# Patient Record
Sex: FEMALE | Race: BLACK OR AFRICAN AMERICAN | NOT HISPANIC OR LATINO | Employment: FULL TIME | URBAN - METROPOLITAN AREA
[De-identification: names, ages, dates, MRNs, and addresses within clinical notes are randomized per-mention and may not be internally consistent; named-entity substitution may affect disease eponyms.]

---

## 2017-03-30 DIAGNOSIS — F32.A DEPRESSION: ICD-10-CM

## 2017-03-30 RX ORDER — DULOXETIN HYDROCHLORIDE 60 MG/1
CAPSULE, DELAYED RELEASE ORAL
Qty: 30 CAPSULE | Refills: 0 | Status: SHIPPED | OUTPATIENT
Start: 2017-03-30 | End: 2017-05-17 | Stop reason: SDUPTHER

## 2017-05-17 ENCOUNTER — TELEPHONE (OUTPATIENT)
Dept: INTERNAL MEDICINE | Facility: CLINIC | Age: 47
End: 2017-05-17

## 2017-05-17 DIAGNOSIS — F32.A DEPRESSION: ICD-10-CM

## 2017-05-17 RX ORDER — DULOXETIN HYDROCHLORIDE 60 MG/1
CAPSULE, DELAYED RELEASE ORAL
Qty: 30 CAPSULE | Refills: 0 | Status: SHIPPED | OUTPATIENT
Start: 2017-05-17 | End: 2017-07-04 | Stop reason: SDUPTHER

## 2017-05-17 NOTE — TELEPHONE ENCOUNTER
Please contact her to schedule her annual physical exam with me sometime in the next 3 months.  I did okay her medication.

## 2017-05-18 ENCOUNTER — PATIENT MESSAGE (OUTPATIENT)
Dept: INTERNAL MEDICINE | Facility: CLINIC | Age: 47
End: 2017-05-18

## 2017-06-01 ENCOUNTER — HOSPITAL ENCOUNTER (OUTPATIENT)
Dept: RADIOLOGY | Facility: HOSPITAL | Age: 47
Discharge: HOME | End: 2017-06-01
Attending: NURSE PRACTITIONER
Payer: COMMERCIAL

## 2017-06-01 ENCOUNTER — OFFICE VISIT (OUTPATIENT)
Dept: OBSTETRICS AND GYNECOLOGY | Facility: CLINIC | Age: 47
End: 2017-06-01
Payer: COMMERCIAL

## 2017-06-01 VITALS
HEIGHT: 70 IN | WEIGHT: 257.94 LBS | BODY MASS INDEX: 36.93 KG/M2 | DIASTOLIC BLOOD PRESSURE: 72 MMHG | SYSTOLIC BLOOD PRESSURE: 110 MMHG

## 2017-06-01 DIAGNOSIS — Z12.39 BREAST CANCER SCREENING: ICD-10-CM

## 2017-06-01 DIAGNOSIS — E89.40 POST HYSTERECTOMY MENOPAUSE: ICD-10-CM

## 2017-06-01 DIAGNOSIS — Z12.31 VISIT FOR SCREENING MAMMOGRAM: ICD-10-CM

## 2017-06-01 DIAGNOSIS — Z01.419 VISIT FOR GYNECOLOGIC EXAMINATION: Primary | ICD-10-CM

## 2017-06-01 DIAGNOSIS — Z90.710 POST HYSTERECTOMY MENOPAUSE: ICD-10-CM

## 2017-06-01 PROCEDURE — 99999 PR PBB SHADOW E&M-EST. PATIENT-LVL III: CPT | Mod: PBBFAC,,, | Performed by: NURSE PRACTITIONER

## 2017-06-01 PROCEDURE — 99396 PREV VISIT EST AGE 40-64: CPT | Mod: S$GLB,,, | Performed by: NURSE PRACTITIONER

## 2017-06-01 PROCEDURE — 77067 SCR MAMMO BI INCL CAD: CPT | Mod: TC

## 2017-06-01 PROCEDURE — 77063 BREAST TOMOSYNTHESIS BI: CPT | Mod: 26,,, | Performed by: RADIOLOGY

## 2017-06-01 PROCEDURE — 77067 SCR MAMMO BI INCL CAD: CPT | Mod: 26,,, | Performed by: RADIOLOGY

## 2017-06-01 NOTE — PROGRESS NOTES
HISTORY OF PRESENT ILLNESS:    Caryn Zarate is a 46 y.o. female,   presents today for her routine visit and has no gyn complaints.      Past Medical History:   Diagnosis Date    Depression     IFG (impaired fasting glucose) 3/18/2016    Obesity     Sleep apnea 2014    Vitamin D deficiency disease 3/18/2016       Past Surgical History:   Procedure Laterality Date    LAPAROSCOPIC HYSTERECTOMY  2009    [facility], for uterine fibroids    TUBAL LIGATION         MEDICATIONS AND ALLERGIES:      Current Outpatient Prescriptions:     duloxetine (CYMBALTA) 60 MG capsule, TAKE 1 CAPSULE(60 MG) BY MOUTH EVERY DAY, Disp: 30 capsule, Rfl: 0    Review of patient's allergies indicates:   Allergen Reactions    No known drug allergies        Family History   Problem Relation Age of Onset    Hypertension Mother     Diabetes Father     Kidney disease Father      Dialysis    Cancer Maternal Grandmother      breast    Breast cancer Maternal Grandmother     Breast cancer      Colon cancer Neg Hx     Ovarian cancer Neg Hx     Melanoma Neg Hx     Psoriasis Neg Hx     Lupus Neg Hx        Social History     Social History    Marital status:      Spouse name: N/A    Number of children: N/A    Years of education: N/A     Occupational History     [id number]     Social History Main Topics    Smoking status: Never Smoker    Smokeless tobacco: Never Used    Alcohol use No    Drug use: No    Sexual activity: Yes     Partners: Male     Birth control/ protection: Surgical     Other Topics Concern    Not on file     Social History Narrative    No narrative on file       OB HISTORY: Number of vaginal deliveries: 2 Number of SAB:1     COMPREHENSIVE GYN HISTORY:  PAP History: Denies abnormal Paps.  Infection History: Denies STDs. Denies PID.  Benign History: Denies uterine fibroids. Denies ovarian cysts. Denies endometriosis. Denies other conditions.  Cancer History: Denies cervical  "cancer. Denies uterine cancer or hyperplasia. Denies ovarian cancer. Denies vulvar cancer or pre-cancer. Denies vaginal cancer or pre-cancer. Denies breast cancer. Denies colon cancer.  Sexual Activity History: Reports currently being sexually active  Menstrual History: Denies menses. Pt is  (hyst 2009)  not on ERT.      ROS:  GENERAL: No weight changes. No swelling. No fatigue. No fever.  CARDIOVASCULAR: No chest pain. No shortness of breath. No leg cramps.   NEUROLOGICAL: No headaches. No vision changes.  BREASTS: No pain. No lumps. No discharge.  ABDOMEN: No pain. No nausea. No vomiting. No diarrhea. No constipation.  REPRODUCTIVE: No abnormal bleeding.  VULVA: No pain. No lesions. No itching.  VAGINA: No relaxation. No itching. No odor. No discharge. No lesions.  URINARY: No incontinence. No nocturia. No frequency. No dysuria.    /72   Ht 5' 10" (1.778 m)   Wt 117 kg (257 lb 15 oz)   BMI 37.01 kg/m²     PE:  APPEARANCE: Well nourished, well developed, in no acute distress.  AFFECT: WNL, alert and oriented x 3.  SKIN: No acne or hirsutism.  NECK: Neck symmetric without masses or thyromegaly.  NODES: No inguinal, cervical, axillary or femoral lymph node enlargement.  CHEST: Good respiratory effort.   ABDOMEN: OBESE. Soft. No tenderness or masses. PANNUS PRESENT.  BREASTS: PENDULOUS. Symmetrical, no skin changes or visible lesions. No palpable masses, nipple discharge bilaterally.  PELVIC: Normal external female genitalia without lesions. Normal hair distribution. Adequate perineal body, normal urethral meatus. Vagina pink, well rugated without lesions or discharge. No significant cystocele or rectocele. Bimanual exam shows CERVIX and UTERUS to be SURGICALLY ABSENT. Adnexa without masses or tenderness. EXAM DIFFICULT DUE TO BODY HABITUS.  EXTREMITIES: No edema.    DIAGNOSIS:  1. Visit for gynecologic examination    2. Post hysterectomy menopause    3. Breast cancer screening        Orders Placed This " Encounter    Mammo Digital Screening Bilat with CAD       COUNSELING:  The patient was counseled today on  -ACS PAP guidelines (no paps), with recommendations for yearly pelvic exams as her uterus and cervix were removed for benign reasons and ovaries remain;  -recommendation for yearly mammogram;  -to see her primary care physician for all other health maintenance.    FOLLOW-UP with me for next routine visit.

## 2017-06-05 ENCOUNTER — PATIENT MESSAGE (OUTPATIENT)
Dept: INTERNAL MEDICINE | Facility: CLINIC | Age: 47
End: 2017-06-05

## 2017-07-04 DIAGNOSIS — F32.A DEPRESSION: ICD-10-CM

## 2017-07-04 RX ORDER — DULOXETIN HYDROCHLORIDE 60 MG/1
CAPSULE, DELAYED RELEASE ORAL
Qty: 30 CAPSULE | Refills: 0 | Status: SHIPPED | OUTPATIENT
Start: 2017-07-04 | End: 2017-08-20 | Stop reason: SDUPTHER

## 2017-08-03 ENCOUNTER — PATIENT MESSAGE (OUTPATIENT)
Dept: INTERNAL MEDICINE | Facility: CLINIC | Age: 47
End: 2017-08-03

## 2017-08-04 ENCOUNTER — TELEPHONE (OUTPATIENT)
Dept: INTERNAL MEDICINE | Facility: CLINIC | Age: 47
End: 2017-08-04

## 2017-08-04 DIAGNOSIS — N95.1 MENOPAUSE SYNDROME: Primary | ICD-10-CM

## 2017-08-08 ENCOUNTER — TELEPHONE (OUTPATIENT)
Dept: OBSTETRICS AND GYNECOLOGY | Facility: CLINIC | Age: 47
End: 2017-08-08

## 2017-08-08 NOTE — TELEPHONE ENCOUNTER
----- Message from Jana Becerra sent at 8/8/2017  6:53 AM CDT -----  Contact: paOnde request  Message     Appointment Request From: Caryn Zarate    With Provider: Other - (see comments) [oth]    Would Accept With:Request to see a new provider    Preferred Date Range: From 8/9/2017 To 8/10/2017    Preferred Times: Any    Reason for visit: Request an Appt    Comments:  I have referral for Dr. Enrico Pollock - GYN hormone specialist.  I would like an appointment with Dr. Pollock on August 9th between 7:30am - 1pm or August 10th between 9:30am - 1pm.    Thank you.

## 2017-08-20 DIAGNOSIS — F32.A DEPRESSION: ICD-10-CM

## 2017-08-21 RX ORDER — ERGOCALCIFEROL 1.25 MG/1
CAPSULE ORAL
Qty: 12 CAPSULE | Refills: 0 | Status: SHIPPED | OUTPATIENT
Start: 2017-08-21 | End: 2017-11-29

## 2017-08-21 RX ORDER — DULOXETIN HYDROCHLORIDE 60 MG/1
CAPSULE, DELAYED RELEASE ORAL
Qty: 30 CAPSULE | Refills: 0 | Status: SHIPPED | OUTPATIENT
Start: 2017-08-21 | End: 2017-10-25 | Stop reason: SDUPTHER

## 2017-10-25 DIAGNOSIS — F32.A DEPRESSION: ICD-10-CM

## 2017-10-25 RX ORDER — DULOXETIN HYDROCHLORIDE 60 MG/1
CAPSULE, DELAYED RELEASE ORAL
Qty: 30 CAPSULE | Refills: 0 | Status: SHIPPED | OUTPATIENT
Start: 2017-10-25 | End: 2017-12-31 | Stop reason: SDUPTHER

## 2017-11-29 ENCOUNTER — TELEPHONE (OUTPATIENT)
Dept: INTERNAL MEDICINE | Facility: CLINIC | Age: 47
End: 2017-11-29

## 2017-11-29 ENCOUNTER — PATIENT MESSAGE (OUTPATIENT)
Dept: INTERNAL MEDICINE | Facility: CLINIC | Age: 47
End: 2017-11-29

## 2017-11-29 ENCOUNTER — LAB VISIT (OUTPATIENT)
Dept: LAB | Facility: HOSPITAL | Age: 47
End: 2017-11-29
Attending: INTERNAL MEDICINE
Payer: COMMERCIAL

## 2017-11-29 ENCOUNTER — OFFICE VISIT (OUTPATIENT)
Dept: INTERNAL MEDICINE | Facility: CLINIC | Age: 47
End: 2017-11-29
Payer: COMMERCIAL

## 2017-11-29 VITALS
DIASTOLIC BLOOD PRESSURE: 70 MMHG | HEIGHT: 70 IN | WEIGHT: 255.75 LBS | BODY MASS INDEX: 36.61 KG/M2 | SYSTOLIC BLOOD PRESSURE: 110 MMHG

## 2017-11-29 DIAGNOSIS — E55.9 VITAMIN D DEFICIENCY DISEASE: ICD-10-CM

## 2017-11-29 DIAGNOSIS — R53.83 OTHER FATIGUE: ICD-10-CM

## 2017-11-29 DIAGNOSIS — R73.01 IFG (IMPAIRED FASTING GLUCOSE): ICD-10-CM

## 2017-11-29 DIAGNOSIS — E66.9 OBESITY, CLASS II, BMI 35-39.9: ICD-10-CM

## 2017-11-29 DIAGNOSIS — N64.4 BREAST PAIN, RIGHT: ICD-10-CM

## 2017-11-29 DIAGNOSIS — N64.4 BREAST PAIN, RIGHT: Primary | ICD-10-CM

## 2017-11-29 LAB
25(OH)D3+25(OH)D2 SERPL-MCNC: 18 NG/ML
ALBUMIN SERPL BCP-MCNC: 3.6 G/DL
ALP SERPL-CCNC: 89 U/L
ALT SERPL W/O P-5'-P-CCNC: 20 U/L
ANION GAP SERPL CALC-SCNC: 7 MMOL/L
AST SERPL-CCNC: 23 U/L
BASOPHILS # BLD AUTO: 0.02 K/UL
BASOPHILS NFR BLD: 0.4 %
BILIRUB SERPL-MCNC: 0.6 MG/DL
BUN SERPL-MCNC: 10 MG/DL
CALCIUM SERPL-MCNC: 9.2 MG/DL
CHLORIDE SERPL-SCNC: 104 MMOL/L
CO2 SERPL-SCNC: 29 MMOL/L
CREAT SERPL-MCNC: 0.9 MG/DL
DIFFERENTIAL METHOD: NORMAL
EOSINOPHIL # BLD AUTO: 0.1 K/UL
EOSINOPHIL NFR BLD: 2.5 %
ERYTHROCYTE [DISTWIDTH] IN BLOOD BY AUTOMATED COUNT: 12.1 %
EST. GFR  (AFRICAN AMERICAN): >60 ML/MIN/1.73 M^2
EST. GFR  (NON AFRICAN AMERICAN): >60 ML/MIN/1.73 M^2
ESTIMATED AVG GLUCOSE: 120 MG/DL
GLUCOSE SERPL-MCNC: 91 MG/DL
HBA1C MFR BLD HPLC: 5.8 %
HCT VFR BLD AUTO: 38.6 %
HGB BLD-MCNC: 13.1 G/DL
LYMPHOCYTES # BLD AUTO: 1.9 K/UL
LYMPHOCYTES NFR BLD: 35.9 %
MCH RBC QN AUTO: 30.3 PG
MCHC RBC AUTO-ENTMCNC: 33.9 G/DL
MCV RBC AUTO: 89 FL
MONOCYTES # BLD AUTO: 0.4 K/UL
MONOCYTES NFR BLD: 7.6 %
NEUTROPHILS # BLD AUTO: 2.8 K/UL
NEUTROPHILS NFR BLD: 53.4 %
NRBC BLD-RTO: 0 /100 WBC
PLATELET # BLD AUTO: 284 K/UL
PMV BLD AUTO: 9.3 FL
POTASSIUM SERPL-SCNC: 4.2 MMOL/L
PROT SERPL-MCNC: 6.9 G/DL
RBC # BLD AUTO: 4.33 M/UL
SODIUM SERPL-SCNC: 140 MMOL/L
TSH SERPL DL<=0.005 MIU/L-ACNC: 1.38 UIU/ML
VIT B12 SERPL-MCNC: 630 PG/ML
WBC # BLD AUTO: 5.26 K/UL

## 2017-11-29 PROCEDURE — 83036 HEMOGLOBIN GLYCOSYLATED A1C: CPT

## 2017-11-29 PROCEDURE — 82306 VITAMIN D 25 HYDROXY: CPT

## 2017-11-29 PROCEDURE — 99214 OFFICE O/P EST MOD 30 MIN: CPT | Mod: S$GLB,,, | Performed by: INTERNAL MEDICINE

## 2017-11-29 PROCEDURE — 80053 COMPREHEN METABOLIC PANEL: CPT

## 2017-11-29 PROCEDURE — 36415 COLL VENOUS BLD VENIPUNCTURE: CPT

## 2017-11-29 PROCEDURE — 84443 ASSAY THYROID STIM HORMONE: CPT

## 2017-11-29 PROCEDURE — 99999 PR PBB SHADOW E&M-EST. PATIENT-LVL III: CPT | Mod: PBBFAC,,, | Performed by: INTERNAL MEDICINE

## 2017-11-29 PROCEDURE — 85025 COMPLETE CBC W/AUTO DIFF WBC: CPT

## 2017-11-29 PROCEDURE — 82607 VITAMIN B-12: CPT

## 2017-11-29 RX ORDER — VIT C/E/ZN/COPPR/LUTEIN/ZEAXAN 250MG-90MG
2000 CAPSULE ORAL DAILY
Qty: 60 CAPSULE | Refills: 12 | COMMUNITY
Start: 2017-11-29 | End: 2020-02-03

## 2017-11-29 RX ORDER — ERGOCALCIFEROL 1.25 MG/1
50000 CAPSULE ORAL
Qty: 12 CAPSULE | Refills: 12 | Status: SHIPPED | OUTPATIENT
Start: 2017-11-29 | End: 2018-11-29

## 2017-11-29 NOTE — PROGRESS NOTES
Subjective:       Patient ID: Caryn Zarate is a 47 y.o. female.    Chief Complaint: Breast Pain (R)    [facility] appt    R breast pain x 3 days.   Does not feel any mass.   Not a constant pain.  Always in same spot.   Sx may last for about a minute or so several times a day.  No injury or trauma.  Has been increasing her walking 2 miles daily.  Did hang Xmas lights on Sunday.  Is also doing sit ups.    Hurts when driving and her R arm is moving across body it really hurts.      Sx getting worse.    Trying to lose weight, some slow success.  Not using CPAP; does not feel she needs this now, no longer snoring.  BRYCE issues discussed.    Mood stable.    Patient Active Problem List:     Depression     Obesity, Class II, BMI 35-39.9     Sleep apnea: needs CPAP 10 per sleep study 6/14     IFG (impaired fasting glucose)     Vitamin D deficiency disease        Review of Systems   Respiratory: Negative for cough and shortness of breath.         Feels apnea better   Cardiovascular: Negative for chest pain.        Breast pain   Genitourinary:        No bleeding (hysterectomy)  No cyclical nature to sx   Psychiatric/Behavioral: Negative for dysphoric mood. The patient is not nervous/anxious.         Stable mood       Objective:      Physical Exam   Constitutional: She is oriented to person, place, and time. She appears well-developed and well-nourished.   HENT:   Head: Normocephalic and atraumatic.   Right Ear: External ear normal.   Left Ear: External ear normal.   Mouth/Throat: Oropharynx is clear and moist.   Eyes: Conjunctivae are normal.   Neck: Normal range of motion. Neck supple. No thyromegaly present.   Cardiovascular: Normal rate, regular rhythm and normal heart sounds.    Pulmonary/Chest: No respiratory distress. She has no wheezes. She has no rales.   BREASTS: No masses, nipple d/c or LN  Diffuse tenderness R > L   Abdominal: Soft.   Musculoskeletal: She exhibits no edema.   Lymphadenopathy:     She has no cervical  adenopathy.   Neurological: She is alert and oriented to person, place, and time.   Skin: Skin is warm and dry. No rash noted. No erythema.       Assessment:       1. Breast pain, right    2. Vitamin D deficiency disease    3. IFG (impaired fasting glucose)    4. Other fatigue    5. Obesity, Class II, BMI 35-39.9        Plan:         Breast pain, right: low dose NSAIDs a/w tylenol; get new bra with more support as her breasts are large and pendulous.  May be mechanical/chest wall.  Recent mammogram wnl.  No nipple d/c. Anticipate should be self-limited.  Avoid caffeine (she already drinks little caffeine)  -     CBC auto differential; Future; Expected date: 11/29/2017  -     US Breast Right Complete; Future; Expected date: 11/29/2017    Vitamin D deficiency disease  -     Vitamin D; Future; Expected date: 11/29/2017    IFG (impaired fasting glucose)  -     Comprehensive metabolic panel; Future; Expected date: 11/29/2017  -     Hemoglobin A1c; Future; Expected date: 11/29/2017    Other fatigue: no alarm sx, mood stable on Cymbalta  -     TSH; Future; Expected date: 11/29/2017  -     Vitamin B12; Future; Expected date: 11/29/2017    Obesity, Class II, BMI 35-39.9: continue lifestyle modification; BRYCE issues and relevance reiterated    I will review all studies and determine further tx depending on findings

## 2017-12-31 DIAGNOSIS — F32.A DEPRESSION: ICD-10-CM

## 2018-01-02 RX ORDER — DULOXETIN HYDROCHLORIDE 60 MG/1
CAPSULE, DELAYED RELEASE ORAL
Qty: 30 CAPSULE | Refills: 0 | Status: SHIPPED | OUTPATIENT
Start: 2018-01-02 | End: 2018-02-16 | Stop reason: SDUPTHER

## 2018-02-16 DIAGNOSIS — F32.A DEPRESSION: ICD-10-CM

## 2018-02-16 RX ORDER — DULOXETIN HYDROCHLORIDE 60 MG/1
CAPSULE, DELAYED RELEASE ORAL
Qty: 30 CAPSULE | Refills: 0 | Status: SHIPPED | OUTPATIENT
Start: 2018-02-16 | End: 2018-04-28 | Stop reason: SDUPTHER

## 2018-02-19 ENCOUNTER — TELEPHONE (OUTPATIENT)
Dept: INTERNAL MEDICINE | Facility: CLINIC | Age: 48
End: 2018-02-19

## 2018-02-19 NOTE — TELEPHONE ENCOUNTER
She no showed today after scheduling online.  Please contact her to see that she want to come in or which he like to schedule some blood work as she had a question about her glucose level.  Thank you

## 2018-03-08 ENCOUNTER — OFFICE VISIT (OUTPATIENT)
Dept: INTERNAL MEDICINE | Facility: CLINIC | Age: 48
End: 2018-03-08
Payer: COMMERCIAL

## 2018-03-08 ENCOUNTER — LAB VISIT (OUTPATIENT)
Dept: LAB | Facility: HOSPITAL | Age: 48
End: 2018-03-08
Attending: INTERNAL MEDICINE
Payer: COMMERCIAL

## 2018-03-08 VITALS
SYSTOLIC BLOOD PRESSURE: 110 MMHG | DIASTOLIC BLOOD PRESSURE: 60 MMHG | WEIGHT: 249.13 LBS | HEIGHT: 70 IN | BODY MASS INDEX: 35.66 KG/M2

## 2018-03-08 DIAGNOSIS — N95.1 MENOPAUSE SYNDROME: ICD-10-CM

## 2018-03-08 DIAGNOSIS — E55.9 VITAMIN D DEFICIENCY DISEASE: ICD-10-CM

## 2018-03-08 DIAGNOSIS — F33.41 RECURRENT MAJOR DEPRESSIVE DISORDER, IN PARTIAL REMISSION: ICD-10-CM

## 2018-03-08 DIAGNOSIS — E66.9 OBESITY, CLASS II, BMI 35-39.9: ICD-10-CM

## 2018-03-08 DIAGNOSIS — N95.1 MENOPAUSE SYNDROME: Primary | ICD-10-CM

## 2018-03-08 DIAGNOSIS — R73.01 IFG (IMPAIRED FASTING GLUCOSE): ICD-10-CM

## 2018-03-08 DIAGNOSIS — R10.12 LEFT UPPER QUADRANT PAIN: ICD-10-CM

## 2018-03-08 DIAGNOSIS — G47.33 OBSTRUCTIVE SLEEP APNEA SYNDROME: ICD-10-CM

## 2018-03-08 LAB
25(OH)D3+25(OH)D2 SERPL-MCNC: 24 NG/ML
ALBUMIN SERPL BCP-MCNC: 3.7 G/DL
ALP SERPL-CCNC: 88 U/L
ALT SERPL W/O P-5'-P-CCNC: 15 U/L
AMORPH CRY UR QL COMP ASSIST: ABNORMAL
AMYLASE SERPL-CCNC: 58 U/L
ANION GAP SERPL CALC-SCNC: 8 MMOL/L
AST SERPL-CCNC: 21 U/L
BACTERIA #/AREA URNS AUTO: ABNORMAL /HPF
BASOPHILS # BLD AUTO: 0.03 K/UL
BASOPHILS NFR BLD: 0.5 %
BILIRUB SERPL-MCNC: 0.4 MG/DL
BILIRUB UR QL STRIP: NEGATIVE
BUN SERPL-MCNC: 14 MG/DL
CALCIUM SERPL-MCNC: 9.3 MG/DL
CHLORIDE SERPL-SCNC: 107 MMOL/L
CLARITY UR REFRACT.AUTO: ABNORMAL
CO2 SERPL-SCNC: 27 MMOL/L
COLOR UR AUTO: YELLOW
CREAT SERPL-MCNC: 0.9 MG/DL
DIFFERENTIAL METHOD: NORMAL
EOSINOPHIL # BLD AUTO: 0.1 K/UL
EOSINOPHIL NFR BLD: 2.4 %
ERYTHROCYTE [DISTWIDTH] IN BLOOD BY AUTOMATED COUNT: 12.1 %
EST. GFR  (AFRICAN AMERICAN): >60 ML/MIN/1.73 M^2
EST. GFR  (NON AFRICAN AMERICAN): >60 ML/MIN/1.73 M^2
ESTIMATED AVG GLUCOSE: 114 MG/DL
FSH SERPL-ACNC: 7.9 MIU/ML
GLUCOSE SERPL-MCNC: 77 MG/DL
GLUCOSE UR QL STRIP: NEGATIVE
HBA1C MFR BLD HPLC: 5.6 %
HCT VFR BLD AUTO: 37.8 %
HGB BLD-MCNC: 12.7 G/DL
HGB UR QL STRIP: NEGATIVE
KETONES UR QL STRIP: NEGATIVE
LEUKOCYTE ESTERASE UR QL STRIP: NEGATIVE
LIPASE SERPL-CCNC: 16 U/L
LYMPHOCYTES # BLD AUTO: 1.9 K/UL
LYMPHOCYTES NFR BLD: 32.1 %
MCH RBC QN AUTO: 30.9 PG
MCHC RBC AUTO-ENTMCNC: 33.6 G/DL
MCV RBC AUTO: 92 FL
MICROSCOPIC COMMENT: ABNORMAL
MONOCYTES # BLD AUTO: 0.5 K/UL
MONOCYTES NFR BLD: 8 %
NEUTROPHILS # BLD AUTO: 3.3 K/UL
NEUTROPHILS NFR BLD: 56.5 %
NITRITE UR QL STRIP: NEGATIVE
NRBC BLD-RTO: 0 /100 WBC
PH UR STRIP: 5 [PH] (ref 5–8)
PLATELET # BLD AUTO: 262 K/UL
PMV BLD AUTO: 9.4 FL
POTASSIUM SERPL-SCNC: 3.9 MMOL/L
PROT SERPL-MCNC: 6.9 G/DL
PROT UR QL STRIP: NEGATIVE
RBC # BLD AUTO: 4.11 M/UL
SODIUM SERPL-SCNC: 142 MMOL/L
SP GR UR STRIP: 1.02 (ref 1–1.03)
SQUAMOUS #/AREA URNS AUTO: 4 /HPF
TSH SERPL DL<=0.005 MIU/L-ACNC: 1.39 UIU/ML
URN SPEC COLLECT METH UR: ABNORMAL
UROBILINOGEN UR STRIP-ACNC: 2 EU/DL
WBC # BLD AUTO: 5.89 K/UL
WBC #/AREA URNS AUTO: 2 /HPF (ref 0–5)

## 2018-03-08 PROCEDURE — 85025 COMPLETE CBC W/AUTO DIFF WBC: CPT

## 2018-03-08 PROCEDURE — 36415 COLL VENOUS BLD VENIPUNCTURE: CPT

## 2018-03-08 PROCEDURE — 81001 URINALYSIS AUTO W/SCOPE: CPT

## 2018-03-08 PROCEDURE — 83690 ASSAY OF LIPASE: CPT

## 2018-03-08 PROCEDURE — 82306 VITAMIN D 25 HYDROXY: CPT

## 2018-03-08 PROCEDURE — 80053 COMPREHEN METABOLIC PANEL: CPT

## 2018-03-08 PROCEDURE — 99214 OFFICE O/P EST MOD 30 MIN: CPT | Mod: S$GLB,,, | Performed by: INTERNAL MEDICINE

## 2018-03-08 PROCEDURE — 99999 PR PBB SHADOW E&M-EST. PATIENT-LVL III: CPT | Mod: PBBFAC,,, | Performed by: INTERNAL MEDICINE

## 2018-03-08 PROCEDURE — 84443 ASSAY THYROID STIM HORMONE: CPT

## 2018-03-08 PROCEDURE — 83036 HEMOGLOBIN GLYCOSYLATED A1C: CPT

## 2018-03-08 PROCEDURE — 82150 ASSAY OF AMYLASE: CPT

## 2018-03-08 PROCEDURE — 83001 ASSAY OF GONADOTROPIN (FSH): CPT

## 2018-03-08 NOTE — Clinical Note
Hi Berkley:  Martha lady.  Some sadness, situational stress, a bit lonely and isolated,  No SI or HI.  Daughters doing well.  Trying to exercise and lose weight; some motivation issues on occasion.  Has a counselor and on antidepressants but could use some more support.  Can you reach out to her?  Thanks  Layla Laws

## 2018-03-08 NOTE — PROGRESS NOTES
Subjective:       Patient ID: Caryn Zarate is a 47 y.o. female.    Chief Complaint: Follow-up    Short follow up    A few issues    Wants to check on possible diabetes and thyroid.    A few months ago some significant sweating, friend told her she could get checked out.  Occasional fingers get numb as well.    Some sadness, situational stress, a bit lonely and isolated,  No SI or HI.  Daughters doing well.  Trying to exercise and lose weight; some motivation issues on occasion.    Not sure about menopause given hysterectomy.    By the way went to ER today with brief severe LUQ pain, this resolved with belching so LWBS.  No sx before or since but does have a lot of gas.  No bowel changes.    Patient Active Problem List:     Recurrent major depressive disorder, in partial remission     Obesity, Class II, BMI 35-39.9     Sleep apnea: needs CPAP 10 per sleep study 6/14     IFG (impaired fasting glucose)     Vitamin D deficiency disease     Left upper quadrant pain        Review of Systems   Constitutional: Negative for chills, fatigue and fever.   HENT: Negative for congestion and postnasal drip.    Eyes: Negative.    Respiratory: Negative for cough, chest tightness, shortness of breath and wheezing.    Cardiovascular: Negative.    Gastrointestinal: Negative.         See above   Endocrine: Negative for polydipsia, polyphagia and polyuria.   Genitourinary:        Sweating   Neurological: Negative for syncope.   Psychiatric/Behavioral: Positive for dysphoric mood. Negative for agitation and decreased concentration.       Objective:      Physical Exam   Constitutional: She is oriented to person, place, and time. She appears well-developed and well-nourished.   HENT:   Head: Normocephalic and atraumatic.   Right Ear: External ear normal.   Left Ear: External ear normal.   Mouth/Throat: Oropharynx is clear and moist.   Eyes: Conjunctivae are normal.   Neck: Normal range of motion. Neck supple. No thyromegaly present.    Cardiovascular: Normal rate, regular rhythm and normal heart sounds.    Pulmonary/Chest: No respiratory distress. She has no wheezes. She has no rales.   Abdominal: Soft. Bowel sounds are normal.   Musculoskeletal: She exhibits no edema.   Lymphadenopathy:     She has no cervical adenopathy.   Neurological: She is alert and oriented to person, place, and time.   Skin: Skin is warm and dry. No rash noted. No erythema.   Psychiatric: She has a normal mood and affect. Her behavior is normal. Judgment and thought content normal.   Tearful no SI or HI       Assessment:       1. Menopause syndrome    2. IFG (impaired fasting glucose)    3. Obesity, Class II, BMI 35-39.9    4. Vitamin D deficiency disease    5. Left upper quadrant pain    6. Recurrent major depressive disorder, in partial remission    7. Obstructive sleep apnea syndrome        Plan:         Menopause syndrome  -     Follicle stimulating hormone; Future; Expected date: 03/08/2018    IFG (impaired fasting glucose)  -     CBC auto differential; Future; Expected date: 03/08/2018  -     Comprehensive metabolic panel; Future; Expected date: 03/08/2018  -     Hemoglobin A1c; Future; Expected date: 03/08/2018    Obesity, Class II, BMI 35-39.9: working on this.  Denies BRYCE sx and declines tx- reviewed  -     TSH; Future; Expected date: 03/08/2018    Vitamin D deficiency disease  -     Vitamin D; Future; Expected date: 03/08/2018    Left upper quadrant pain: resolved.  No alarm sx  -     US Abdomen Complete; Future; Expected date: 03/08/2018  -     Amylase; Future; Expected date: 03/08/2018  -     Lipase; Future; Expected date: 03/08/2018  -     Urinalysis    Recurrent major depressive disorder, in partial remission: stable on meds.  Exercise, prayer, meditation.  She has a counselor and will contact her.  No SI or HI.  Health  recommended    BRYCE see above: implications reviewed    Other orders  -     Urinalysis Microscopic    I will review all studies and  determine further tx depending on findings

## 2018-03-09 ENCOUNTER — PATIENT MESSAGE (OUTPATIENT)
Dept: INTERNAL MEDICINE | Facility: CLINIC | Age: 48
End: 2018-03-09

## 2018-03-21 ENCOUNTER — PATIENT MESSAGE (OUTPATIENT)
Dept: INTERNAL MEDICINE | Facility: CLINIC | Age: 48
End: 2018-03-21

## 2018-03-21 ENCOUNTER — HOSPITAL ENCOUNTER (OUTPATIENT)
Dept: RADIOLOGY | Facility: HOSPITAL | Age: 48
Discharge: HOME | End: 2018-03-21
Attending: INTERNAL MEDICINE
Payer: COMMERCIAL

## 2018-03-21 ENCOUNTER — TELEPHONE (OUTPATIENT)
Dept: INTERNAL MEDICINE | Facility: CLINIC | Age: 48
End: 2018-03-21

## 2018-03-21 DIAGNOSIS — R10.12 LEFT UPPER QUADRANT PAIN: ICD-10-CM

## 2018-03-21 DIAGNOSIS — K76.0 FATTY LIVER: ICD-10-CM

## 2018-03-21 DIAGNOSIS — N28.1 RENAL CYST, LEFT: ICD-10-CM

## 2018-03-21 PROCEDURE — 76700 US EXAM ABDOM COMPLETE: CPT | Mod: 26,,, | Performed by: RADIOLOGY

## 2018-03-21 PROCEDURE — 76700 US EXAM ABDOM COMPLETE: CPT | Mod: TC

## 2018-03-29 ENCOUNTER — OFFICE VISIT (OUTPATIENT)
Dept: HEPATOLOGY | Facility: CLINIC | Age: 48
End: 2018-03-29
Payer: COMMERCIAL

## 2018-03-29 VITALS
TEMPERATURE: 97 F | WEIGHT: 249.31 LBS | HEIGHT: 70 IN | HEART RATE: 70 BPM | OXYGEN SATURATION: 97 % | BODY MASS INDEX: 35.69 KG/M2 | DIASTOLIC BLOOD PRESSURE: 83 MMHG | SYSTOLIC BLOOD PRESSURE: 126 MMHG

## 2018-03-29 DIAGNOSIS — R73.01 IFG (IMPAIRED FASTING GLUCOSE): ICD-10-CM

## 2018-03-29 DIAGNOSIS — E66.9 OBESITY, CLASS II, BMI 35-39.9: ICD-10-CM

## 2018-03-29 DIAGNOSIS — K76.0 FATTY LIVER: Primary | ICD-10-CM

## 2018-03-29 PROBLEM — R10.12 LEFT UPPER QUADRANT PAIN: Status: RESOLVED | Noted: 2018-03-08 | Resolved: 2018-03-29

## 2018-03-29 PROCEDURE — 99999 PR PBB SHADOW E&M-EST. PATIENT-LVL III: CPT | Mod: PBBFAC,,, | Performed by: NURSE PRACTITIONER

## 2018-03-29 PROCEDURE — 99204 OFFICE O/P NEW MOD 45 MIN: CPT | Mod: S$GLB,,, | Performed by: NURSE PRACTITIONER

## 2018-03-29 NOTE — PROGRESS NOTES
[facility] Visit New Patient Note    REFERRING PROVIDER: Layla Laws MD    CHIEF COMPLAINT: Fatty liver      HPI: This is a 47 y.o. Black or  female referred for evaluation of fatty liver. Fatty liver present on abdominal US from 3/21/18. Liver noted to be enlarged at 18.6 cm, spleen normal size at 8.3 cm. Review of labs - essentially normal transaminases. Alk phos normal. TBili normal at 0.4. Albumin normal. INR normal in 2014. Platelets normal at 262. Negative for viral hepatitis (2016).                 Risk factors for fatty liver: obesity, Body mass index is 35.78 kg/m²., and impaired fasting glucose. HgbA1c 5.6. Reports weight has fluctuated, she is trying to walk every day (~ 2 miles). Struggles with her diet. Very rarely drinks alcohol. Denies IV or intranasal drug use. Denies family hx of liver disease. She feels well, no complaints. Denies any symptoms of advanced liver disease including jaundice, dark urine, abdominal distention, lower extremity edema, hematemesis, melena, or slowed mentation.             Review of patient's allergies indicates:   Allergen Reactions    No known drug allergies      Current Outpatient Prescriptions on File Prior to Visit   Medication Sig Dispense Refill    DULoxetine (CYMBALTA) 60 MG capsule TAKE 1 CAPSULE(60 MG) BY MOUTH EVERY DAY 30 capsule 0    ergocalciferol (ERGOCALCIFEROL) 50,000 unit Cap Take 1 capsule (50,000 Units total) by mouth every 7 days. 12 capsule 12    cholecalciferol, vitamin D3, 1,000 unit capsule Take 2 capsules (2,000 Units total) by mouth once daily. 60 capsule 12     No current facility-administered medications on file prior to visit.        PMHX:  has a past medical history of Depression; Fatty liver: see u/s 3/18 (3/21/2018); IFG (impaired fasting glucose) (3/18/2016); Obesity; Renal cyst, left: see u/s 3/18 (3/21/2018); Sleep apnea (5/26/2014); and Vitamin D deficiency disease (3/18/2016).    PSHX:  has a past  "surgical history that includes Laparoscopic hysterectomy (4/14/2009); Tubal ligation; and Hysterectomy.    FAMILY HISTORY: Negative for liver disease, reviewed in [employer].    SOCIAL HISTORY:   History   Smoking Status    Never Smoker   Smokeless Tobacco    Never Used       History   Alcohol Use No       History   Drug Use No       ROS:   GENERAL: Denies fever, chills, weight loss/gain, fatigue  HEENT: Denies headaches, dizziness, vision/hearing changes  CARDIOVASCULAR: Denies chest pain, palpitations, or edema  RESPIRATORY: Denies dyspnea, cough  GI: Denies abdominal pain, rectal bleeding, nausea, vomiting. No change in bowel pattern or color  : Denies dysuria, hematuria   SKIN: Denies rash, itching   NEURO: Denies confusion, memory loss, or mood changes  PSYCH: Denies depression or anxiety  HEME/LYMPH: Denies easy bruising or bleeding    PHYSICAL EXAM:   Friendly Black or  female, in no acute distress; alert and oriented to person, place and time  VITALS: /83 (BP Location: Left arm, Patient Position: Sitting, BP Method: X-Large (Automatic))   Pulse 70   Temp 97.4 °F (36.3 °C) (Oral)   Ht 5' 10" (1.778 m)   Wt 113.1 kg (249 lb 5.4 oz)   SpO2 97%   BMI 35.78 kg/m²   HENT: Normocephalic, without obvious abnormality. Oral mucosa pink and moist.   EYES: Sclerae anicteric.   NECK: Supple. No masses or cervical adenopathy.  CARDIOVASCULAR: Regular rate and rhythm. No murmurs.  RESPIRATORY: Normal respiratory effort. BBS CTA. No wheezes or crackles.  GI: Soft, non-tender, non-distended. No palpable hepatosplenomegaly. No masses palpable. No ascites.  EXTREMITIES:  No clubbing, cyanosis or edema.  SKIN: Warm and dry. No jaundice. No rashes noted to exposed skin. No telangectasias noted. No palmar erythema.  NEURO:  Normal gate. No asterixis.  PSYCH:  Memory intact. Thought and speech pattern appropriate. Behavior normal. No depression or anxiety noted.    RECENT LABS:    Labs:  Lab Results "   Component Value Date    WBC 5.89 03/08/2018    HGB 12.7 03/08/2018    HCT 37.8 03/08/2018     03/08/2018    CHOL 177 05/21/2014    TRIG 77 05/21/2014    HDL 51 05/21/2014     03/08/2018    K 3.9 03/08/2018    CREATININE 0.9 03/08/2018    ALT 15 03/08/2018    AST 21 03/08/2018    ALKPHOS 88 03/08/2018    BILITOT 0.4 03/08/2018    ALBUMIN 3.7 03/08/2018    INR 0.9 10/11/2014       DIAGNOSTIC STUDIES:  ABD. U/S - 3/21/18  FINDINGS:  Liver: Enlarged measuring 18.6 cm. The liver is diffusely increased in echogenicity consistent with fatty changes of the liver.  No focal hepatic lesions.    Gallbladder: No calculi, wall thickening, or pericholecystic fluid.  No sonographic Mckee's sign.    Biliary system: The common duct is not dilated, measuring 4 mm.  No intrahepatic ductal dilatation.    Spleen: Normal in size with a homogeneous echotexture, measuring 8.3 cm.    Pancreas: The visualized portions of pancreas appear normal.    Right kidney: Normal in size with no hydronephrosis, measuring 10.1 x 4.4 x 6.0 cm.  No renal calculi are identified.    Left kidney:  Normal in size with no hydronephrosis, measuring 11.9 x 5.5 x 5.5 cm.  There is a 1.0 cm left upper pole renal cyst.  No renal calculi are identified.    Aorta: No aneurysm.    Inferior vena cava: Normal in appearance.    Miscellaneous: No ascites.   Impression     Moderate hepatomegaly with diffusely increased hepatic echogenicity consistent with Fatty changes of the liver.    1.0 cm left upper pole renal cyst.       ASSESSMENT:  47 y.o. Black or  female with:  1. NAFLD  -- Transaminases essentially normal  -- TBili and alk phos normal  -- Platelets preserved at 262  -- US with hepatomegaly and steatosis; spleen normal size   -- Risk factors for fatty liver include: obesity, impaired fasting glucose      2. Obesity, Body mass index is 35.78 kg/m².  3. Impaired fasting glucose       EDUCATION / DISCUSSION:   We discussed the  manifestations of NAFLD. At this time there is no FDA approved therapy for NAFLD. The patient and I discussed the importance of diet, exercise, and weight loss for management of NAFLD. We discussed a low fat, low carb/low sugar, high fiber diet, and a goal of 30 minutes of exercise 5 times per week. Pt is aware that fatty liver can progress to steatohepatitis and possibly to cirrhosis, putting one at increased risk for liver cancer, liver failure, and death.       PLAN:  1. Fibroscan for fibrosis and steatosis staging  2. Will check immunity markers for HBV/HAV and arrange for vaccination if needed.  3. Weight loss goal of 15-20 lbs  4. Low fat, low cholesterol, low carb, high fiber diet  5. Exercise, 5 days a week, 30 min a day  6. Recommend good control of cholesterol, blood pressure, blood sugar levels  7. Follow up pending Fibroscan results       Thank you for allowing me to participate in the care of LUIS ANTONIO Garay-C  Hepatology and Liver Transplant     Patient was seen in collaboration with Dr. Burns

## 2018-03-29 NOTE — LETTER
March 29, 2018      Layla Laws MD  [address]  [place] [facility] [address]           [facility] - [facility]atology  [address]  [place] [address]  Phone: [phone]  Fax: [phone]          Patient: Caryn Zarate   MR Number: [id number]   YOB: 1970   Date of Visit: 3/29/2018       Dear Dr. Layla Laws:    Thank you for referring Caryn Zarate to me for evaluation. Attached you will find relevant portions of my assessment and plan of care.    If you have questions, please do not hesitate to call me. I look forward to following Caryn Zaraet along with you.    Sincerely,    Gardenia Sung, SHEYLA    Enclosure  CC:  No Recipients    If you would like to receive this communication electronically, please contact [email or web address] or [phone] to request more information on [email or web address] Link access.    For providers and/or their staff who would like to refer a patient to [facility], please contact us through our one-stop-shop provider referral line, [facility], [place], at [phone].    If you feel you have received this communication in error or would no longer like to receive these types of communications, please e-mail [email or web address]

## 2018-03-29 NOTE — PROGRESS NOTES
I have reviewed and concur with the CAROLEE's history, physical, assessment, and plan.  I have personally interviewed and examined the patient at bedside.  See below addendum for my evaluation and additional findings.    48yo female with elevated liver tests and risk factors for NAFLD.  Fibroscan for fibrosis staging.  Discussed importance of risk factor modification including tight control of diabetes, hypertension and cholesterol.  Also recommend healthy diet and increased physical activity for goal of weight loss.  If fibroscan normal, patient may follow with primary care but should continue to maintain good control of risk factors.        Patient will return to clinic based on fibroscan results

## 2018-04-16 ENCOUNTER — TELEPHONE (OUTPATIENT)
Dept: INTERNAL MEDICINE | Facility: CLINIC | Age: 48
End: 2018-04-16

## 2018-04-16 NOTE — TELEPHONE ENCOUNTER
..Called patient left message, intro self, referred by Dr. Laws.   Request call back at direct number [phone].  Berkley Vargas RN HC

## 2018-04-16 NOTE — TELEPHONE ENCOUNTER
----- Message from Layla Laws MD sent at 3/9/2018  5:44 AM CST -----  Hi Berkley:    Martha lady.  Some sadness, situational stress, a bit lonely and isolated,  No SI or HI.  Daughters doing well.  Trying to exercise and lose weight; some motivation issues on occasion.  Has a counselor and on antidepressants but could use some more support.  Can you reach out to her?  Thanks    Layla Laws

## 2018-04-17 ENCOUNTER — PATIENT MESSAGE (OUTPATIENT)
Dept: INTERNAL MEDICINE | Facility: CLINIC | Age: 48
End: 2018-04-17

## 2018-04-28 DIAGNOSIS — F32.A DEPRESSION: ICD-10-CM

## 2018-04-30 RX ORDER — DULOXETIN HYDROCHLORIDE 60 MG/1
CAPSULE, DELAYED RELEASE ORAL
Qty: 30 CAPSULE | Refills: 0 | Status: SHIPPED | OUTPATIENT
Start: 2018-04-30 | End: 2018-07-05 | Stop reason: SDUPTHER

## 2018-06-15 ENCOUNTER — PATIENT MESSAGE (OUTPATIENT)
Dept: INTERNAL MEDICINE | Facility: CLINIC | Age: 48
End: 2018-06-15

## 2018-06-19 ENCOUNTER — HOSPITAL ENCOUNTER (OUTPATIENT)
Dept: RADIOLOGY | Facility: HOSPITAL | Age: 48
Discharge: HOME | End: 2018-06-19
Attending: NURSE PRACTITIONER
Payer: COMMERCIAL

## 2018-06-19 ENCOUNTER — OFFICE VISIT (OUTPATIENT)
Dept: INTERNAL MEDICINE | Facility: CLINIC | Age: 48
End: 2018-06-19
Payer: COMMERCIAL

## 2018-06-19 VITALS
WEIGHT: 244.69 LBS | BODY MASS INDEX: 35.03 KG/M2 | HEART RATE: 66 BPM | HEIGHT: 70 IN | SYSTOLIC BLOOD PRESSURE: 104 MMHG | DIASTOLIC BLOOD PRESSURE: 66 MMHG

## 2018-06-19 DIAGNOSIS — Z12.31 BREAST CANCER SCREENING BY MAMMOGRAM: ICD-10-CM

## 2018-06-19 DIAGNOSIS — M54.31 SCIATICA OF RIGHT SIDE: Primary | ICD-10-CM

## 2018-06-19 DIAGNOSIS — E66.9 OBESITY, CLASS II, BMI 35-39.9: ICD-10-CM

## 2018-06-19 PROCEDURE — 3008F BODY MASS INDEX DOCD: CPT | Mod: CPTII,S$GLB,, | Performed by: NURSE PRACTITIONER

## 2018-06-19 PROCEDURE — 77067 SCR MAMMO BI INCL CAD: CPT | Mod: TC

## 2018-06-19 PROCEDURE — 77067 SCR MAMMO BI INCL CAD: CPT | Mod: 26,,, | Performed by: RADIOLOGY

## 2018-06-19 PROCEDURE — 99999 PR PBB SHADOW E&M-EST. PATIENT-LVL III: CPT | Mod: PBBFAC,,, | Performed by: NURSE PRACTITIONER

## 2018-06-19 PROCEDURE — 99213 OFFICE O/P EST LOW 20 MIN: CPT | Mod: S$GLB,,, | Performed by: NURSE PRACTITIONER

## 2018-06-19 RX ORDER — IBUPROFEN 600 MG/1
600 TABLET ORAL EVERY 6 HOURS PRN
Qty: 40 TABLET | Refills: 0 | Status: SHIPPED | OUTPATIENT
Start: 2018-06-19 | End: 2018-06-19 | Stop reason: SDUPTHER

## 2018-06-19 RX ORDER — MULTIVITAMIN
1 TABLET ORAL DAILY
COMMUNITY

## 2018-06-19 RX ORDER — METHOCARBAMOL 500 MG/1
500 TABLET, FILM COATED ORAL 3 TIMES DAILY
Qty: 30 TABLET | Refills: 0 | Status: SHIPPED | OUTPATIENT
Start: 2018-06-19 | End: 2018-06-29

## 2018-06-19 RX ORDER — IBUPROFEN 600 MG/1
600 TABLET ORAL EVERY 6 HOURS PRN
Qty: 30 TABLET | Refills: 0 | Status: SHIPPED | OUTPATIENT
Start: 2018-06-19 | End: 2019-12-11 | Stop reason: SDUPTHER

## 2018-06-19 NOTE — PROGRESS NOTES
"Subjective:       Patient ID: Caryn Zarate is a 47 y.o. female.    Chief Complaint: Leg Pain    Pt presents with complaints of numbness and tingling down her right hip and leg after walking 5 miles as part of a new exercise plan she has been on since November to lose weight, which has helped her. She left an email on 6/15/18 to her PCP Dr. Laws stating, "When I walk a mile, my right leg starts to go numb and tingles.  I have to turn around in my neighborhood and only walk two miles.  Any suggestions?" She recently started exercising back in November as mentioned above, and has lost weight as a result, however this problem has started. In further convo with PCP, she states that it is associated with back pain. She was scheduled for evaluation.    See details below.      Back Pain   This is a new problem. The current episode started in the past 7 days (last 3 days of walking). The problem occurs intermittently. The problem has been waxing and waning since onset. The pain is present in the lumbar spine. The quality of the pain is described as shooting and aching. The pain radiates to the right thigh (down right leg). The pain is at a severity of 2/10. The pain is mild. Worse during: worse after walking 2 miles. The symptoms are aggravated by sitting (Walking 2 miles during exercise, prolonged sitting). Stiffness is present: none. Associated symptoms include leg pain, numbness and weight loss. Pertinent negatives include no abdominal pain, chest pain, dysuria, fever, paresis, paresthesias, perianal numbness, tingling or weakness. Risk factors include obesity (not stretching before and after her exercise of walking 5 miles). She has tried nothing for the symptoms. The treatment provided no relief.     Review of Systems   Constitutional: Positive for unexpected weight change and weight loss. Negative for activity change, appetite change and fever.        Intentional weight loss since November   HENT: Negative.    Eyes: " Negative.    Respiratory: Negative.  Negative for apnea, cough, chest tightness and shortness of breath.    Cardiovascular: Negative.  Negative for chest pain, palpitations and leg swelling.   Gastrointestinal: Negative.  Negative for abdominal distention, abdominal pain, anal bleeding, blood in stool, constipation, diarrhea, nausea, rectal pain and vomiting.   Endocrine: Negative.  Negative for cold intolerance, heat intolerance, polydipsia, polyphagia and polyuria.   Genitourinary: Negative.  Negative for difficulty urinating, dysuria and flank pain.   Musculoskeletal: Positive for back pain and myalgias. Negative for arthralgias and joint swelling.        Numbness and tingling down right leg after recent walking regimen   Skin: Negative for color change.   Allergic/Immunologic: Negative for environmental allergies, food allergies and immunocompromised state.   Neurological: Positive for numbness. Negative for dizziness, tingling, speech difficulty, weakness and paresthesias.        Numbness and tingling down right leg after recent walking regimen   Hematological: Negative.  Negative for adenopathy. Does not bruise/bleed easily.   Psychiatric/Behavioral: Negative.        Review of patient's allergies indicates:   Allergen Reactions    No known drug allergies        Current Outpatient Prescriptions:     cholecalciferol, vitamin D3, 1,000 unit capsule, Take 2 capsules (2,000 Units total) by mouth once daily., Disp: 60 capsule, Rfl: 12    DULoxetine (CYMBALTA) 60 MG capsule, TAKE 1 CAPSULE(60 MG) BY MOUTH EVERY DAY, Disp: 30 capsule, Rfl: 0    ergocalciferol (ERGOCALCIFEROL) 50,000 unit Cap, Take 1 capsule (50,000 Units total) by mouth every 7 days., Disp: 12 capsule, Rfl: 12    multivitamin (THERAGRAN) per tablet, Take 1 tablet by mouth once daily., Disp: , Rfl:       Patient Active Problem List   Diagnosis    Recurrent major depressive disorder, in partial remission    Obesity, Class II, BMI 35-39.9     "Sleep apnea: needs CPAP 10 per sleep study - declines tx    IFG (impaired fasting glucose)    Vitamin D deficiency disease    Fatty liver: see u/s 3/18    Renal cyst, left: see u/s 3/18     Past Medical History:   Diagnosis Date    Depression     Fatty liver: see u/s 3/18 3/21/2018    IFG (impaired fasting glucose) 3/18/2016    Obesity     Renal cyst, left: see u/s 3/18 3/21/2018    Sleep apnea 2014    Vitamin D deficiency disease 3/18/2016     Past Surgical History:   Procedure Laterality Date     SECTION      HYSTERECTOMY      ovaries remain    LAPAROSCOPIC HYSTERECTOMY  2009    [facility], for uterine fibroids    TUBAL LIGATION       Social History     Social History    Marital status:      Spouse name: N/A    Number of children: N/A    Years of education: N/A     Occupational History     [employer]     Social History Main Topics    Smoking status: Never Smoker    Smokeless tobacco: Never Used    Alcohol use No    Drug use: No    Sexual activity: Yes     Partners: Male     Birth control/ protection: Surgical     Other Topics Concern    Not on file     Social History Narrative    No narrative on file     Family History   Problem Relation Age of Onset    Hypertension Mother     Diabetes Father     Kidney disease Father         Dialysis    Hypertension Father     Cancer Maternal Grandmother         breast    Breast cancer Maternal Grandmother     No Known Problems Daughter     No Known Problems Daughter     Breast cancer Unknown     Colon cancer Neg Hx     Ovarian cancer Neg Hx     Melanoma Neg Hx     Psoriasis Neg Hx     Lupus Neg Hx     Cirrhosis Neg Hx          Objective:       Vitals:    18 0811   BP: 104/66   Pulse: 66   Weight: 111 kg (244 lb 11.4 oz)   Height: 5' 10" (1.778 m)   PainSc:   2   PainLoc: Hip     Body mass index is 35.11 kg/m².    Physical Exam   Constitutional: She is oriented to person, place, and time. " Vital signs are normal. She appears well-developed and well-nourished.   Neck: Trachea normal, normal range of motion and full passive range of motion without pain. Neck supple. No JVD present. Carotid bruit is not present.   Cardiovascular: Normal rate, regular rhythm, S1 normal, S2 normal, normal heart sounds, intact distal pulses and normal pulses.    Pulmonary/Chest: Effort normal and breath sounds normal.   Abdominal: Soft. Normal appearance and bowel sounds are normal. She exhibits no distension. There is no hepatosplenomegaly. There is no tenderness. There is no rebound and no guarding. No hernia.   Musculoskeletal: Normal range of motion. She exhibits no edema, tenderness or deformity.   Pulling pain right him down leg upon sitting on exam table.    - SLRs on left, +SLR at 45 degrees right leg    NROM, normal gait   Neurological: She is alert and oriented to person, place, and time. She has normal strength and normal reflexes.   Skin: Skin is warm, dry and intact. Capillary refill takes less than 2 seconds.   Psychiatric: She has a normal mood and affect. Her speech is normal and behavior is normal. Judgment and thought content normal. Cognition and memory are normal.   Vitals reviewed.      Assessment:     1. Sciatica of right side     2. BMI 35.0-35.9,adult     3. Obesity, Class II, BMI 35-39.9         Plan:     Caryn was seen today for leg pain.    Diagnoses and all orders for this visit:    Sciatica of right side  AVS included info on sciatica and home care  -     methocarbamol (ROBAXIN) 500 MG Tab; Take 1 tablet (500 mg total) by mouth 3 (three) times daily. As needed for any numbness or tingling, use sparingly  -     ibuprofen (ADVIL,MOTRIN) 600 MG tablet; Take 1 tablet (600 mg total) by mouth every 6 (six) hours as needed for Pain (with food or milk). No more than 4 per day  If no improvement, instructed to contact us for PT referral.    BMI 35.0-35.9,adult  BMI reviewed.    Diet and exercise to lose  weight.    Obesity, Class II, BMI 35-39.9  BMI reviewed.    Diet and exercise to lose weight.    Breast cancer screening by mammogram  -     Mammo Digital Screening Bilat with CAD; Future

## 2018-06-19 NOTE — PATIENT INSTRUCTIONS
Ibuprofen 600mg four times a day with food or milk as needed for back and leg pain, no more than 4 tabs a day    Robaxin 500mg three times a day as needed for numbness or tingling, use sparingly due to drowsy effect    Warm compresses prn, see info below    Stretch 5 minutes before and 5 minutes after exercise    Mammogram ordered today    Sciatica    Sciatica is a condition that causes pain in the lower back that spreads down into the buttock, hip, and leg. Sometimes the leg pain can happen without any back pain. Sciatica happens when a spinal nerve is irritated or has pressure put on it as comes out of the spinal canal in the lower back. This most often happens when a bulge or rupture of a nearby spinal disk presses on the nerve. Sciatica can also be caused by a narrowing of the spinal canal (spinal stenosis) or spasm of the muscle in the buttocks that the sciatic nerve passes through (pyriform muscle). Sciatica is also called lumbar radiculopathy.  Sciatica may begin after a sudden twisting or bending force, such as in a car accident. Or it can happen after a simple awkward movement. In either case, muscle spasm often also happens. Muscle spasm makes the pain worse.  A healthcare provider makes a diagnosis of sciatica from your symptoms and a physical exam. Unless you had an injury from a car accident or fall, you usually wont have X-rays taken at this time. This is because the nerves and disks in your back cant be seen on an X-ray. If the provider sees signs of a compressed nerve, you will need to schedule an MRI scan as an outpatient. Signs of a compressed nerve include loss of strength in a leg.  Most sciatica gets better with medicine, exercise, and physical therapy. If your symptoms continue after at least 3 months of medical treatment, you may need surgery or injections to your lower back.  Home care  Follow these tips when caring for yourself at home:  · You may need to stay in bed the first few days.  But as soon as possible, begin sitting up or walking. This will help you avoid problems that come from staying in bed for long periods.  · When in bed, try to find a position that is comfortable. A firm mattress is best. Try lying flat on your back with pillows under your knees. You can also try lying on your side with your knees bent up toward your chest and a pillow between your knees.  · Avoid sitting for long periods. This puts more stress on your lower back than standing or walking.  · Use heat from a hot shower, hot bath, or heating pad to help ease pain. Massage can also help. You can also try using an ice pack. You can make your own ice pack by putting ice cubes in a plastic bag. Wrap the bag in a thin towel. Try both heat and cold to see which works best. Use the method that feels best for 20 minutes several times a day.  · You may use acetaminophen or ibuprofen to ease pain, unless another pain medicine was prescribed. Note: If you have chronic liver or kidney disease, talk with your healthcare provider before taking these medicines. Also talk with your provider if youve had a stomach ulcer or gastrointestinal bleeding.  · Use safe lifting methods. Dont lift anything heavier than 15 pounds until all of the pain is gone.  Follow-up care  Follow up with your healthcare provider, or as advised. You may need physical therapy or additional tests.  If X-rays were taken, a radiologist will look at them. You will be told of any new findings that may affect your care.  When to seek medical advice  Call your healthcare provider right away if any of these occur:  · Pain gets worse even after taking prescribed medicine  · Weakness or numbness in 1 or both legs or hips  · Numbness in your groin or genital area  · You cant control your bowel or bladder  · Fever  · Redness or swelling over your back or spine   Date Last Reviewed: 8/1/2016  © 7091-8148 [email or web address]. [address] [place] [address], [place]  [address]. All rights reserved. This information is not intended as a substitute for professional medical care. Always follow your healthcare professional's instructions.

## 2018-06-21 ENCOUNTER — TELEPHONE (OUTPATIENT)
Dept: INTERNAL MEDICINE | Facility: CLINIC | Age: 48
End: 2018-06-21

## 2018-06-21 NOTE — TELEPHONE ENCOUNTER
----- Message from Elisabeth Sears DNP sent at 6/20/2018  6:54 PM CDT -----  Mammogram normal, repeat in 1yr

## 2018-07-05 ENCOUNTER — PATIENT MESSAGE (OUTPATIENT)
Dept: INTERNAL MEDICINE | Facility: CLINIC | Age: 48
End: 2018-07-05

## 2018-07-05 DIAGNOSIS — F32.A DEPRESSION: ICD-10-CM

## 2018-07-05 RX ORDER — DULOXETIN HYDROCHLORIDE 60 MG/1
CAPSULE, DELAYED RELEASE ORAL
Qty: 30 CAPSULE | Refills: 0 | Status: SHIPPED | OUTPATIENT
Start: 2018-07-05 | End: 2018-09-09 | Stop reason: SDUPTHER

## 2018-07-06 ENCOUNTER — OFFICE VISIT (OUTPATIENT)
Dept: INTERNAL MEDICINE | Facility: CLINIC | Age: 48
End: 2018-07-06
Payer: COMMERCIAL

## 2018-07-06 ENCOUNTER — HOSPITAL ENCOUNTER (OUTPATIENT)
Dept: RADIOLOGY | Facility: HOSPITAL | Age: 48
Discharge: HOME | End: 2018-07-06
Attending: INTERNAL MEDICINE
Payer: COMMERCIAL

## 2018-07-06 VITALS
HEART RATE: 67 BPM | WEIGHT: 243.63 LBS | HEIGHT: 70 IN | SYSTOLIC BLOOD PRESSURE: 108 MMHG | DIASTOLIC BLOOD PRESSURE: 60 MMHG | BODY MASS INDEX: 34.88 KG/M2

## 2018-07-06 DIAGNOSIS — R20.2 PARESTHESIA OF LEFT LEG: Primary | ICD-10-CM

## 2018-07-06 DIAGNOSIS — R20.2 PARESTHESIA OF LEFT LEG: ICD-10-CM

## 2018-07-06 PROCEDURE — 99999 PR PBB SHADOW E&M-EST. PATIENT-LVL III: CPT | Mod: PBBFAC,,, | Performed by: INTERNAL MEDICINE

## 2018-07-06 PROCEDURE — 99213 OFFICE O/P EST LOW 20 MIN: CPT | Mod: S$GLB,,, | Performed by: INTERNAL MEDICINE

## 2018-07-06 PROCEDURE — 93971 EXTREMITY STUDY: CPT | Mod: TC

## 2018-07-06 PROCEDURE — 3008F BODY MASS INDEX DOCD: CPT | Mod: CPTII,S$GLB,, | Performed by: INTERNAL MEDICINE

## 2018-07-06 PROCEDURE — 93971 EXTREMITY STUDY: CPT | Mod: 26,,, | Performed by: RADIOLOGY

## 2018-07-06 NOTE — PROGRESS NOTES
Subjective:       Patient ID: Caryn Zarate is a 48 y.o. female.    Chief Complaint: Numbness    Happy lady who loves to drive drove straight through from [place] to [facility].  Somewhere in [place] she noted tingling in left thigh and prox lower leg.  No swelling, no loss of motion or strength.  Denies back pain or buttock pain      Review of Systems   Constitutional: Negative for activity change, chills, fatigue and fever.   HENT: Negative for congestion, ear pain, nosebleeds, postnasal drip, sinus pressure and sore throat.    Eyes: Negative.  Negative for visual disturbance.   Respiratory: Negative for cough, chest tightness, shortness of breath and wheezing.    Cardiovascular: Negative for chest pain.   Gastrointestinal: Negative for abdominal pain, diarrhea, nausea and vomiting.   Genitourinary: Negative for difficulty urinating, dysuria, frequency and urgency.   Musculoskeletal: Negative for arthralgias and neck stiffness.   Skin: Negative for rash.   Neurological: Negative for dizziness, weakness and headaches.   Psychiatric/Behavioral: Negative for sleep disturbance. The patient is not nervous/anxious.        Objective:      Physical Exam   Musculoskeletal:        Left upper leg: She exhibits no tenderness, no bony tenderness, no swelling, no edema, no deformity and no laceration.        Left lower leg: She exhibits no tenderness, no bony tenderness, no swelling, no edema, no deformity and no laceration.        Legs:      Assessment:       1. Paresthesia of left leg        Plan:   Caryn was seen today for numbness.    Diagnoses and all orders for this visit:    Paresthesia of left leg  -     Cancel: CAR Ultrasound doppler venous leg left; Future  -      Lower Extremity Veins Left; Future

## 2018-07-16 DIAGNOSIS — Z12.11 COLON CANCER SCREENING: ICD-10-CM

## 2018-08-08 ENCOUNTER — PATIENT MESSAGE (OUTPATIENT)
Dept: INTERNAL MEDICINE | Facility: CLINIC | Age: 48
End: 2018-08-08

## 2018-08-08 DIAGNOSIS — D22.9 NEVUS: Primary | ICD-10-CM

## 2018-08-13 ENCOUNTER — PATIENT MESSAGE (OUTPATIENT)
Dept: ADMINISTRATIVE | Facility: HOSPITAL | Age: 48
End: 2018-08-13

## 2018-08-28 ENCOUNTER — PATIENT MESSAGE (OUTPATIENT)
Dept: INTERNAL MEDICINE | Facility: CLINIC | Age: 48
End: 2018-08-28

## 2018-08-28 DIAGNOSIS — R10.2 PELVIC PAIN IN FEMALE: Primary | ICD-10-CM

## 2018-09-09 DIAGNOSIS — F32.A DEPRESSION: ICD-10-CM

## 2018-09-09 RX ORDER — DULOXETIN HYDROCHLORIDE 60 MG/1
CAPSULE, DELAYED RELEASE ORAL
Qty: 30 CAPSULE | Refills: 0 | Status: SHIPPED | OUTPATIENT
Start: 2018-09-09 | End: 2018-10-24 | Stop reason: SDUPTHER

## 2018-09-11 ENCOUNTER — OFFICE VISIT (OUTPATIENT)
Dept: OBSTETRICS AND GYNECOLOGY | Facility: CLINIC | Age: 48
End: 2018-09-11
Payer: COMMERCIAL

## 2018-09-11 VITALS
SYSTOLIC BLOOD PRESSURE: 102 MMHG | DIASTOLIC BLOOD PRESSURE: 68 MMHG | HEIGHT: 70 IN | WEIGHT: 243.38 LBS | BODY MASS INDEX: 34.84 KG/M2

## 2018-09-11 DIAGNOSIS — M79.18 MUSCULOSKELETAL PAIN: ICD-10-CM

## 2018-09-11 DIAGNOSIS — R10.2 PELVIC PAIN: Primary | ICD-10-CM

## 2018-09-11 PROCEDURE — 99999 PR PBB SHADOW E&M-EST. PATIENT-LVL III: CPT | Mod: PBBFAC,,, | Performed by: OBSTETRICS & GYNECOLOGY

## 2018-09-11 PROCEDURE — 3008F BODY MASS INDEX DOCD: CPT | Mod: CPTII,S$GLB,, | Performed by: OBSTETRICS & GYNECOLOGY

## 2018-09-11 PROCEDURE — 99214 OFFICE O/P EST MOD 30 MIN: CPT | Mod: S$GLB,,, | Performed by: OBSTETRICS & GYNECOLOGY

## 2018-09-11 NOTE — PROGRESS NOTES
Subjective:       Patient ID: Caryn Zarate is a 48 y.o. female.    Chief Complaint:  Pelvic Pain (Patient reports pelvic pain during excerise and at night.)      History of Present Illness:  HPI  49 y/o  presents for evaluation of pelvic pain. Has lost 50 lbs with diet and exercise. Started about 3 weeks while she was exercising (situps). Stabbing pain, not able to pinpoint. Pain now comes and goes, mostly at night. No diarrhea or constipation. She believes the pain is musculoskeletal in nature and is planned to start PT therapy soon. S/P RA TL in , ovarian conservation. She does have menopausal symptoms for the past 5 years (no HRT). Does report some COURTNEY but is not very bothersome, does not have to wear a pad daily. C/S x 2.    GYN & OB History  No LMP recorded. Patient has had a hysterectomy.   Date of Last Pap: No result found    OB History    Para Term  AB Living   3 2 2   1 2   SAB TAB Ectopic Multiple Live Births   1       2      # Outcome Date GA Lbr Ronak/2nd Weight Sex Delivery Anes PTL Lv   3 SAB         FD   2 Term      CS-Unspec  N IRENA   1 Term      CS-Unspec  N IRENA          Review of Systems  Review of Systems   Constitutional: Negative for chills, diaphoresis, fatigue and fever.   Respiratory: Negative for cough and shortness of breath.    Cardiovascular: Negative for chest pain and palpitations.   Gastrointestinal: Negative for abdominal pain, constipation, diarrhea, nausea and vomiting.   Genitourinary: Positive for pelvic pain. Negative for dyspareunia, vaginal bleeding, vaginal discharge and vaginal pain.   Musculoskeletal: Negative for back pain and myalgias.   Skin:  Negative for rash and no acne.   Neurological: Negative for headaches.   Psychiatric/Behavioral: Negative for depression. The patient is not nervous/anxious.            Objective:    Physical Exam:   Constitutional: She is oriented to person, place, and time. She appears well-developed and well-nourished. No  distress.    HENT:   Head: Normocephalic and atraumatic.    Eyes: EOM are normal.    Neck: Normal range of motion.     Pulmonary/Chest: Effort normal.        Abdominal: Soft. Bowel sounds are normal. She exhibits no distension and no mass. There is no tenderness. There is no rebound and no guarding. No hernia.     Genitourinary:   Genitourinary Comments: Normal external female genitalia. Vagina normal. Uterus/cervix surgically absent. No adnexal masses palpated. No tenderness on bimanual exam.              Musculoskeletal: Normal range of motion and moves all extremeties.       Neurological: She is alert and oriented to person, place, and time.    Skin: Skin is warm. No rash noted.    Psychiatric: She has a normal mood and affect. Her behavior is normal. Judgment and thought content normal.          Assessment:        1. Pelvic pain    2. Musculoskeletal pain             Plan:      Caryn was seen today for pelvic pain.    Diagnoses and all orders for this visit:    Pelvic pain  - Pain seems like musculoskeletal in nature. Benign pelvic exam. If not improved with PT, will consider TVUS to evaluate for pelvic mass/ovaries. Patient in agreement with plan.    Musculoskeletal pain    No orders of the defined types were placed in this encounter.      Follow-up if symptoms worsen or fail to improve.

## 2018-09-11 NOTE — LETTER
September 14, 2018      Layla Laws MD  [address]  [facility] [address]           [facility] [address]  [address] [place] [address]  [facility] [address]  Phone: [phone]  Fax: [phone]          Patient: Caryn Zarate   MR Number: [id number]   YOB: 1970   Date of Visit: 9/11/2018       Dear Dr. Layla Laws:    Thank you for referring Caryn Zarate to me for evaluation. Attached you will find relevant portions of my assessment and plan of care.    If you have questions, please do not hesitate to call me. I look forward to following Caryn Zarate along with you.    Sincerely,    Carleen Hinojosa MD    Enclosure  CC:  No Recipients    If you would like to receive this communication electronically, please contact [email or web address] or [phone] to request more information on [employer] access.    For providers and/or their staff who would like to refer a patient to [facility], please contact us through our one-stop-shop provider referral line, [facility], at [phone].    If you feel you have received this communication in error or would no longer like to receive these types of communications, please e-mail [email or web address]

## 2018-10-15 ENCOUNTER — CLINICAL SUPPORT (OUTPATIENT)
Dept: REHABILITATION | Facility: HOSPITAL | Age: 48
End: 2018-10-15
Attending: INTERNAL MEDICINE
Payer: COMMERCIAL

## 2018-10-15 DIAGNOSIS — R10.2 PELVIC PAIN: Primary | ICD-10-CM

## 2018-10-15 DIAGNOSIS — M62.89 PELVIC FLOOR DYSFUNCTION: ICD-10-CM

## 2018-10-15 PROCEDURE — 97161 PT EVAL LOW COMPLEX 20 MIN: CPT | Mod: PN

## 2018-10-15 PROCEDURE — 97112 NEUROMUSCULAR REEDUCATION: CPT | Mod: PN
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
## 2018-10-15 NOTE — PATIENT INSTRUCTIONS
DIAPHRAGMATIC BREATHING     The diaphragm is a dome shaped muscle that forms the floor of the rib cage. It is the most efficient muscle for breathing and relaxation, although most people are not used to using the diaphragm. Diaphragmatic breathing is an important technique to learn because it helps settle down or relax the autonomic nervous system. The correct use of diaphragmatic breathing can help to quiet brain activity resulting in the relaxation of all the muscles and organs of the body.     How to do proper relaxation breathing:     Start by lying on your back in a relaxed position with knees bent, feet flat or some support under your knees. Place your hands on your ribs.   Relax your jaw, corners of the mouth, corners of the eyes, abdomen, inner thighs, gluts, hips, legs.    Take a deep breath in through your nose.    Exhale out through your mouth as if blowing through a straw. Exhale completely.   Remember to breathe slowly.  Do not force your breathing, this is a gentle breath. Do not hold your breath.   Repeat for 5 or so breaths.     Bring one hand to the belly, one hand on the chest. Breath deeply and allow the abdomen and chest to rise and fall evenly. 5 or so breaths.   5-10 minutes daily.                  Legs in a chair x 10-15 minutes

## 2018-10-24 DIAGNOSIS — F32.A DEPRESSION: ICD-10-CM

## 2018-10-24 RX ORDER — DULOXETIN HYDROCHLORIDE 60 MG/1
CAPSULE, DELAYED RELEASE ORAL
Qty: 30 CAPSULE | Refills: 0 | Status: SHIPPED | OUTPATIENT
Start: 2018-10-24 | End: 2018-10-30 | Stop reason: SDUPTHER

## 2018-10-30 ENCOUNTER — OFFICE VISIT (OUTPATIENT)
Dept: INTERNAL MEDICINE | Facility: CLINIC | Age: 48
End: 2018-10-30
Payer: COMMERCIAL

## 2018-10-30 ENCOUNTER — LAB VISIT (OUTPATIENT)
Dept: LAB | Facility: HOSPITAL | Age: 48
End: 2018-10-30
Payer: COMMERCIAL

## 2018-10-30 ENCOUNTER — IMMUNIZATION (OUTPATIENT)
Dept: PHARMACY | Facility: CLINIC | Age: 48
End: 2018-10-30
Payer: COMMERCIAL

## 2018-10-30 ENCOUNTER — IMMUNIZATION (OUTPATIENT)
Dept: PHARMACY | Facility: CLINIC | Age: 48
End: 2018-10-30

## 2018-10-30 VITALS
SYSTOLIC BLOOD PRESSURE: 120 MMHG | BODY MASS INDEX: 34.72 KG/M2 | DIASTOLIC BLOOD PRESSURE: 80 MMHG | WEIGHT: 242.5 LBS | HEIGHT: 70 IN

## 2018-10-30 DIAGNOSIS — Z00.00 ANNUAL PHYSICAL EXAM: Primary | ICD-10-CM

## 2018-10-30 DIAGNOSIS — F32.A DEPRESSION: ICD-10-CM

## 2018-10-30 DIAGNOSIS — Z00.00 ANNUAL PHYSICAL EXAM: ICD-10-CM

## 2018-10-30 DIAGNOSIS — Z12.11 COLON CANCER SCREENING: ICD-10-CM

## 2018-10-30 DIAGNOSIS — K76.0 FATTY LIVER: ICD-10-CM

## 2018-10-30 PROBLEM — R10.2 PELVIC PAIN: Status: RESOLVED | Noted: 2018-10-15 | Resolved: 2018-10-30

## 2018-10-30 PROBLEM — E66.09 CLASS 1 OBESITY DUE TO EXCESS CALORIES WITH BODY MASS INDEX (BMI) OF 34.0 TO 34.9 IN ADULT: Status: ACTIVE | Noted: 2018-10-30

## 2018-10-30 PROBLEM — E66.811 CLASS 1 OBESITY DUE TO EXCESS CALORIES WITH BODY MASS INDEX (BMI) OF 34.0 TO 34.9 IN ADULT: Status: ACTIVE | Noted: 2018-10-30

## 2018-10-30 LAB
25(OH)D3+25(OH)D2 SERPL-MCNC: 27 NG/ML
FERRITIN SERPL-MCNC: 379 NG/ML
HBV CORE AB SERPL QL IA: NEGATIVE
HBV SURFACE AB SER-ACNC: NEGATIVE M[IU]/ML
HEPATITIS A ANTIBODY, IGG: NEGATIVE
IRON SERPL-MCNC: 94 UG/DL
SATURATED IRON: 33 %
TOTAL IRON BINDING CAPACITY: 284 UG/DL
TRANSFERRIN SERPL-MCNC: 192 MG/DL

## 2018-10-30 PROCEDURE — 86790 VIRUS ANTIBODY NOS: CPT

## 2018-10-30 PROCEDURE — 82728 ASSAY OF FERRITIN: CPT

## 2018-10-30 PROCEDURE — 86706 HEP B SURFACE ANTIBODY: CPT

## 2018-10-30 PROCEDURE — 86704 HEP B CORE ANTIBODY TOTAL: CPT

## 2018-10-30 PROCEDURE — 36415 COLL VENOUS BLD VENIPUNCTURE: CPT

## 2018-10-30 PROCEDURE — 83540 ASSAY OF IRON: CPT

## 2018-10-30 PROCEDURE — 99999 PR PBB SHADOW E&M-EST. PATIENT-LVL IV: CPT | Mod: PBBFAC,,, | Performed by: INTERNAL MEDICINE

## 2018-10-30 PROCEDURE — 82306 VITAMIN D 25 HYDROXY: CPT

## 2018-10-30 PROCEDURE — 99396 PREV VISIT EST AGE 40-64: CPT | Mod: S$GLB,,, | Performed by: INTERNAL MEDICINE

## 2018-10-30 RX ORDER — DULOXETIN HYDROCHLORIDE 60 MG/1
CAPSULE, DELAYED RELEASE ORAL
Qty: 30 CAPSULE | Refills: 12 | Status: SHIPPED | OUTPATIENT
Start: 2018-10-30 | End: 2018-12-18 | Stop reason: SDUPTHER

## 2018-10-30 NOTE — PROGRESS NOTES
Subjective:       Patient ID: Caryn Zarate is a 48 y.o. female.    Chief Complaint: Annual Exam    Annual exam    Deliberate weight loss, 55 # over many years.   Exercising, feels great.  No apnea symptoms.    Getting PT for pelvic floor muscles.    Colonoscopy discussed.   She now agrees to have this done.    Somehow has gotten lost to follow-up with regard to her fatty liver, needed follow-up labs in a fibrosis scan which were not done.  I encouraged her to schedule these.    Immunizations discussed, she is willing to get flu and tetanus today.    Patient Active Problem List:     Recurrent major depressive disorder, in partial remission     Obesity,      Sleep apnea: needs CPAP 10 per sleep study 6/14- declines tx     IFG (impaired fasting glucose)     Vitamin D deficiency disease     Fatty liver: see u/s 3/18     Renal cyst, left: see u/s 3/18     Pelvic floor dysfunction        Review of Systems   Constitutional: Negative for activity change, appetite change, chills, fatigue and fever.   HENT: Negative for congestion, hearing loss, sinus pressure and sore throat.    Eyes: Negative for visual disturbance.   Respiratory: Negative for apnea, cough, shortness of breath and wheezing.    Cardiovascular: Negative for chest pain, palpitations and leg swelling.   Gastrointestinal: Negative for abdominal distention, abdominal pain, constipation, diarrhea, nausea and vomiting.   Genitourinary: Negative for dysuria, frequency, hematuria and vaginal bleeding.        Doing well with pelvic floor PT   Musculoskeletal: Positive for arthralgias. Negative for gait problem, joint swelling and myalgias.        Minimal stable sx   Skin: Negative for rash.   Neurological: Negative for dizziness, weakness, light-headedness and headaches.   Hematological: Negative for adenopathy. Does not bruise/bleed easily.   Psychiatric/Behavioral: Negative for confusion, hallucinations, sleep disturbance and suicidal ideas.       Objective:       Physical Exam   Constitutional: She is oriented to person, place, and time. She appears well-developed and well-nourished.   HENT:   Head: Normocephalic and atraumatic.   Right Ear: External ear normal.   Left Ear: External ear normal.   Nose: Nose normal.   Mouth/Throat: Oropharynx is clear and moist. No oropharyngeal exudate.   Eyes: Conjunctivae and EOM are normal. No scleral icterus.   Neck: Normal range of motion. Neck supple. No JVD present. No thyromegaly present.   Cardiovascular: Normal rate, regular rhythm, normal heart sounds and intact distal pulses. Exam reveals no gallop.   No murmur heard.  Pulmonary/Chest: Effort normal and breath sounds normal. No respiratory distress. She has no wheezes.   Abdominal: Soft. Bowel sounds are normal. She exhibits no distension and no mass. There is no tenderness. There is no rebound and no guarding.   Musculoskeletal: Normal range of motion. She exhibits no edema or tenderness.   Lymphadenopathy:     She has no cervical adenopathy.   Neurological: She is alert and oriented to person, place, and time. She displays normal reflexes. No cranial nerve deficit. Coordination normal.   Skin: Skin is warm. No rash noted. No erythema.   Psychiatric: She has a normal mood and affect. Her behavior is normal. Judgment and thought content normal.   Nursing note and vitals reviewed.      Assessment:       1. Annual physical exam    2. Colon cancer screening    3. Depression        Plan:         Caryn was seen today for annual exam.    Diagnoses and all orders for this visit:    Annual physical exam  -     Vitamin D; Future    Colon cancer screening  -     Case request GI: COLONOSCOPY    Depression  -     DULoxetine (CYMBALTA) 60 MG capsule; TAKE 1 CAPSULE(60 MG) BY MOUTH EVERY DAY     Flu and tetanus shot today  Follow-up with hepatology regarding labs and fibroscan  Continue to work on lifestyle modification, exercise and slow and steady weight loss    I will review all studies  and determine further tx depending on findings

## 2018-10-30 NOTE — PATIENT INSTRUCTIONS
Nonalcoholic Fatty Liver Disease (NAFLD)  Nonalcoholic fatty liver disease (NAFLD) is a common disease of the liver. It occurs when you have too much fat in the liver. If NAFLD is severe, it can cause liver damage that seems like the damage caused by drinking too much alcohol. But NAFLD is not caused by drinking alcohol. This sheet tells you more about NAFLD and how it can be managed.    How the liver works   The liver is an organ in the upper right side of the belly (abdomen). It has many important jobs. These include:  · Breaking down (metabolizing) proteins, carbohydrates, and fats  · Making a substance called bile that helps break down fats  · Storing and releasing sugar (glucose) into the blood to give the body energy  · Removing toxins from the blood  · Helping with blood clotting  Understanding NAFLD  A healthy liver may contain some fat. But if too much fat builds up in the liver, this causes NAFLD. NAFLD can be mild, causing fatty liver. Or it can be more severe and show inflammation, as well as the fat. This can cause non-alcoholic steatohepatitis (STORY).  · Fatty liver. With fatty liver, the liver simply has more fat than normal. This extra fat usually does not harm the liver.  · STORY. With STORY, the fatty liver becomes inflamed over time. STORY is serious because it can lead to scarring of the liver (fibrosis). Over time, the scarring may lead to cirrhosis of the liver. This can eventually cause liver failure or liver cancer.  Causes and risk factors of NAFLD  Doctors don't know what causes NAFLD. But certain things make the problem more likely to happen. These include:  · Obesity  · Prediabetes or diabetes  · High levels of fat found in the blood (cholesterol and triglycerides)  · Being exposed to certain medicines   Symptoms of NAFLD  Most people with NAFLD have no symptoms. If symptoms do occur, they can include:  · Tiredness  · Weakness  · Weight loss  · Loss of appetite  · Nausea and  vomiting  · Belly pain and cramping  · Yellowing of the skin and eyes (jaundice), as well as dark urine, or light-colored stools  · Swelling in the belly or legs  Diagnosing NAFLD  Your healthcare provider may think you have NAFLD if routine blood tests show high levels of liver enzymes. This may mean you have a liver problem. You may need one or more imaging tests, such as an ultrasound, CT, or MRI. You may need more blood tests to look for other causes of liver disease. You may also need a liver biopsy. During this test, a hollow needle is used to remove a tiny tissue sample from your liver. This tissue is then checked in a lab. This test can find signs of damage to liver tissue. It can also help figure out the cause of the damage and tell the difference between fatty liver and STORY.  Treating NAFLD  Treatment for NAFLD varies for each person. The best early treatment is to treat any underlying conditions causing metabolic syndrome. This is the name for a group of conditions that includes:  · High blood pressure  · High levels of cholesterol and triglycerides  · Being overweight or obese  · Diabetes  Your healthcare provider will monitor your health and treat any symptoms or underlying health problems you have. Your provider will also work with you to control your risk factors. This will make liver damage less likely. In fact, treating those underlying conditions can often improve liver disease. You may need to take certain medicines, but no medicine will cure NAFLD. This is why treating the underlying conditions is most important. Your plan may include:  · Losing extra weight  · Getting regular exercise  · Controlling diabetes and high cholesterol or triglyceride levels  · Taking medicines and vitamins as prescribed by your provider  · Quitting smoking  · Not drinking alcohol  · Eating a healthy and balanced diet  Living with NAFLD  If NAFLD is caught early, it can be managed with treatment. Your healthcare  provider will discuss further treatment choices with you as needed.  Be sure to ask your provider about recommended vaccines. These include vaccines for viruses that can cause liver disease.  Date Last Reviewed: 12/1/2016 © 2000-2017 [employer]. [address] [place] [address], [place] [address]. All rights reserved. This information is not intended as a substitute for professional medical care. Always follow your healthcare professional's instructions.          Prevention Guidelines, Women Ages 50 to 64  Screening tests and vaccines are an important part of managing your health. Health counseling is essential, too. Below are guidelines for these, for women ages 50 to 64. Talk with your healthcare provider to make sure youre up to date on what you need.  Screening Who needs it How often   Type 2 diabetes or prediabetes All adults beginning at age 45 and adults without symptoms at any age who are overweight or obese and have 1 or more additional risk factors for diabetes. At  least every 3 years   Alcohol misuse All women in this age group At routine exams   Blood pressure All women in this age group Every 2 years if your blood pressure is less than 120/80 mm Hg; yearly if your systolic blood pressure is 120 to 139 mm Hg, or your diastolic blood pressure reading is 80 to 89 mm Hg   Breast cancer All women in this age group Yearly mammogram and clinical breast exam1   Cervical cancer All women in this age group, except women who have had a complete hysterectomy Pap test every 3 years or Pap test with human papillomavirus (HPV) test every 5 years   Chlamydia Women at increased risk for infection At routine exams   Colorectal cancer All women in this age group Flexible sigmoidoscopy every 5 years, or colonoscopy every 10 years, or double-contrast barium enema every 5 years; yearly fecal occult blood test or fecal immunochemical test; or a stool DNA test as often as your health care provider advises; talk with  your health care provider about which tests are best for you   Depression All women in this age group At routine exams   Gonorrhea Sexually active women at increased risk for infection At routine exams   Hepatitis C Anyone at increased risk; 1 time for those born between 1945 and 1965 At routine exams   High cholesterol or triglycerides All women in this age group who are at risk for coronary artery disease At least every 5 years   HIV All women At routine exams   Lung cancer Adults age 55 to 80 who have smoked Yearly screening in smokers with 30 pack-year history of smoking or who quit within 15 years   Obesity All women in this age group At routine exams   Osteoporosis Women who are postmenopausal Ask your healthcare provider   Syphilis Women at increased risk for infection - talk with your healthcare provider At routine exams   Tuberculosis Women at increased risk for infection - talk with your healthcare provider Ask your healthcare provider   Vision All women in this age group Ask your healthcare provider   Vaccine Who needs it How often   Chickenpox (varicella) All women in this age group who have no record of this infection or vaccine 2 doses; the second dose should be given at least 4 weeks after the first dose   Hepatitis A Women at increased risk for infection - talk with your healthcare provider 2 doses given at least 6 months apart   Hepatitis B Women at increased risk for infection - talk with your healthcare provider 3 doses over 6 months; second dose should be given 1 month after the first dose; the third dose should be given at least 2 months after the second dose and at least 4 months after the first dose   Haemophilus influenzaeType B (HIB) Women at increased risk for infection - talk with your healthcare provider 1 to 3 doses   Influenza (flu) All women in this age group Once a year   Measles, mumps, rubella (MMR) Women in this age group through their late 50s who have no record of these  infections or vaccines 1 dose   Meningococcal Women at increased risk for infection - talk with your healthcare provider 1 or more doses   Pneumococcal conjugate vaccine (PCV13) and pneumococcal polysaccharide vaccine (PPSV23) Women at increased risk for infection - talk with your healthcare provider PCV13: 1 dose ages 19 to 65 (protects against 13 types of pneumococcal bacteria)  PPSV23: 1 to 2 doses through age 64, or 1 dose at 65 or older (protects against 23 types of pneumococcal bacteria)   Tetanus/diphtheria/pertussis (Td/Tdap) booster All women in this age group Td every 10 years, or a one-time dose of Tdap instead of a Td booster after age 18, then Td every 10 years   Zoster All women ages 60 and older 1 dose   Counseling Who needs it How often   BRCA gene mutation testing for breast and ovarian cancer susceptibility Women with increased risk for having gene mutation When your risk is known   Breast cancer and chemoprevention Women at high risk for breast cancer When your risk is known   Diet and exercise Women who are overweight or obese When diagnosed, and then at routine exams   Sexually transmitted infection prevention Women at increased risk for infection - talk with your healthcare provider At routine exams   Use of daily aspirin Women ages 55 and up in this age group who are at risk for cardiovascular health problems such as stroke When your risk is known   Use of tobacco and the health effects it can cause All women in this age group Every exam   1[employer]  Date Last Reviewed: 1/26/2016  © 1413-0754 [employer]. [address] [place] [address]. All rights reserved. This information is not intended as a substitute for professional medical care. Always follow your healthcare professional's instructions.

## 2018-11-01 ENCOUNTER — PATIENT MESSAGE (OUTPATIENT)
Dept: HEPATOLOGY | Facility: CLINIC | Age: 48
End: 2018-11-01

## 2018-11-01 DIAGNOSIS — Z23 NEED FOR HEPATITIS A AND B VACCINATION: Primary | ICD-10-CM

## 2018-11-06 ENCOUNTER — CLINICAL SUPPORT (OUTPATIENT)
Dept: REHABILITATION | Facility: HOSPITAL | Age: 48
End: 2018-11-06
Attending: INTERNAL MEDICINE
Payer: COMMERCIAL

## 2018-11-06 PROCEDURE — 97110 THERAPEUTIC EXERCISES: CPT | Mod: PN

## 2018-11-06 PROCEDURE — 97140 MANUAL THERAPY 1/> REGIONS: CPT | Mod: PN

## 2018-11-06 NOTE — PROGRESS NOTES
"                                                    Physical Therapy Daily Note     Name: Caryn Zarate  Clinic Number: [id number]  Diagnosis: No diagnosis found.  Physician: Layla Laws MD  Precautions: Standard   Visit #: 2 of 20  PTA Visit #: N/A  Time In: 2:05PM  Time Out: 3:00PM  Total Treatment Time 1:1: 50 minutes     Subjective     Pt reports: Pt reports that she was only able to do the scar/stomach stretches for 2 days following evaluation due to increased pain that radiated down the anterior aspect of her left LE. Patient reports that she feels the deep breathing exercises have helped reduce the severity of her pain. Patient was also able walk the pancreatic cancer 5K with no increased pain. Patient has cut back to walking 5 miles 3 times per day     Pain Scale: Caryn rates pain on a scale of 0-10 to be 5 currently.    Objective     Caryn received individual therapeutic exercises to develop ROM for 8 minutes including:   -adductor stretch, 20" x 4 reps    -piriformis stretch, 20" x 4    -bridging x 10     Caryn received the following manual therapy techniques: Soft tissue Mobilization were applied to the left hip for 40 minutes including:  -STM to the left gluteal musculature, including hip ER as well as left hip adductors      Education provided re:   Caryn verbalized good understanding of education provided.   No spiritual or educational barriers to learning provided    PT/PTA face to face conference performed during this session    Assessment     Patient tolerated session well with no complaints of pain following treatment. Patient with increased tone and muscular tightness in the piriformis from the sacrum as well as the obturator externus. Patient with increased tone and tenderness in deep hip flexors and adductors on the anterior left thigh. Patient with slight SI joint ant tilt. Patient treatment focused on manual therapy to improve motion of the SI joint as well and to improve tissue mobility of the " piriformis.   This is a 48 y.o. female referred to outpatient physical therapy and presents with a medical diagnosis of pelvic pain and demonstrates limitations as described in the problem list. Pt prognosis is Good. Pt will continue to benefit from skilled outpatient physical therapy to address the deficits listed in the problem list, provide pt/family education and to maximize pt's level of independence in the home and community environment.     Goals as stated in the initial evaluation      Plan     Continue with established Plan of Care towards PT goals.    Therapist: Teresita Mcmahon, PT  11/6/2018

## 2018-11-20 ENCOUNTER — CLINICAL SUPPORT (OUTPATIENT)
Dept: REHABILITATION | Facility: HOSPITAL | Age: 48
End: 2018-11-20
Attending: INTERNAL MEDICINE
Payer: COMMERCIAL

## 2018-11-20 PROCEDURE — 97140 MANUAL THERAPY 1/> REGIONS: CPT | Mod: PN

## 2018-11-20 NOTE — PROGRESS NOTES
"                                                    Physical Therapy Daily Note     Name: Caryn Zarate  Clinic Number: [id number]  Diagnosis: No diagnosis found.  Physician: Layla Laws MD  Precautions: Standard   Visit #: 2 of 20  PTA Visit #: N/A  Time In: 2:05PM  Time Out: 3:00PM  Total Treatment Time 1:1: 50 minutes     Subjective     Pt reports: Pt reports that felt relief post previous treatment. Patient reports that she had significant increase in internal pelvic floor pain after returning to walking 2 miles per day 3 days last week.     Pain Scale: Caryn rates pain on a scale of 0-10 to be 5 currently.    Objective     Caryn received individual therapeutic exercises to develop ROM for 8 minutes including:   -adductor stretch, 20" x 4 reps    -piriformis stretch, 20" x 4    -bridging x 10    +happy baby stretch    Caryn received the following manual therapy techniques: Soft tissue Mobilization were applied to the left hip for 30 minutes including:  -STM to the left gluteal musculature, including hip ER as well as left hip adductors   -deep pressure release at 6oclock on pelvic floor     Education provided re:   Caryn verbalized good understanding of education provided.   No spiritual or educational barriers to learning provided    PT/PTA face to face conference performed during this session    Assessment     Patient tolerated session well; prior to treatment patient verbalized consent for internal pelvic floor exam. patient able to verbalize when pain increased with pelvic floor lengthening. Patient reported relief in symptoms post treatment. Patient with increased tone and tenderness in deep hip flexors and adductors on the anterior left thigh, release performed. Increased tone and pain with palpation with internal pelvic floor musculature palpation. Deep pressure applied x 30" hold at 6 oclock, relief noted post. Patient with slight SI joint ant tilt. Patient treatment focused on manual therapy to improve " motion of the SI joint as well and to improve tissue mobility of the piriformis  and PFM. Patient taught happy baby stretch to be added to HEP and discussed awareness of glut contraction with prolonged walking  This is a 48 y.o. female referred to outpatient physical therapy and presents with a medical diagnosis of pelvic pain and demonstrates limitations as described in the problem list. Pt prognosis is Good. Pt will continue to benefit from skilled outpatient physical therapy to address the deficits listed in the problem list, provide pt/family education and to maximize pt's level of independence in the home and community environment.     Goals as stated in the initial evaluation      Plan     Continue with established Plan of Care towards PT goals.    Therapist: Teresita Mcmahon, PT  11/20/2018

## 2018-11-27 ENCOUNTER — TELEPHONE (OUTPATIENT)
Dept: ENDOSCOPY | Facility: HOSPITAL | Age: 48
End: 2018-11-27

## 2018-11-27 ENCOUNTER — PATIENT MESSAGE (OUTPATIENT)
Dept: INTERNAL MEDICINE | Facility: CLINIC | Age: 48
End: 2018-11-27

## 2018-11-27 DIAGNOSIS — Z12.11 SPECIAL SCREENING FOR MALIGNANT NEOPLASMS, COLON: Primary | ICD-10-CM

## 2018-11-27 RX ORDER — POLYETHYLENE GLYCOL 3350, SODIUM SULFATE ANHYDROUS, SODIUM BICARBONATE, SODIUM CHLORIDE, POTASSIUM CHLORIDE 236; 22.74; 6.74; 5.86; 2.97 G/4L; G/4L; G/4L; G/4L; G/4L
4 POWDER, FOR SOLUTION ORAL ONCE
Qty: 4000 ML | Refills: 0 | Status: SHIPPED | OUTPATIENT
Start: 2018-11-27 | End: 2018-11-27

## 2018-11-29 ENCOUNTER — OFFICE VISIT (OUTPATIENT)
Dept: DERMATOLOGY | Facility: CLINIC | Age: 48
End: 2018-11-29
Payer: COMMERCIAL

## 2018-11-29 DIAGNOSIS — L82.1 DERMATOSIS PAPULOSA NIGRA: ICD-10-CM

## 2018-11-29 DIAGNOSIS — Q82.5 CONGENITAL NEVUS: Primary | ICD-10-CM

## 2018-11-29 PROCEDURE — 99999 PR PBB SHADOW E&M-EST. PATIENT-LVL II: CPT | Mod: PBBFAC,,, | Performed by: DERMATOLOGY

## 2018-11-29 PROCEDURE — 99213 OFFICE O/P EST LOW 20 MIN: CPT | Mod: S$GLB,,, | Performed by: DERMATOLOGY

## 2018-11-29 NOTE — PROGRESS NOTES
Subjective:       Patient ID:  Caryn Zarate is a 48 y.o. female who presents for   Chief Complaint   Patient presents with    Mole     Face     Initial visit  No h/o skin cancer  Last seen by SHEYLA Caldwell 2016 with skin tags        Mole  - Initial  Affected locations: face  Duration: 50 years  Signs / symptoms: growing  Severity: mild  Timing: constant  Aggravated by: nothing  Relieving factors/Treatments tried: nothing        Review of Systems   Constitutional: Negative for fever, chills and fatigue.   Skin: Negative for daily sunscreen use, activity-related sunscreen use and wears hat.   Hematologic/Lymphatic: Does not bruise/bleed easily.        Objective:    Physical Exam   Constitutional: She appears well-developed and well-nourished. No distress.   Neurological: She is alert and oriented to person, place, and time. She is not disoriented.   Psychiatric: She has a normal mood and affect.   Skin:   Areas Examined (abnormalities noted in diagram):   Head / Face Inspection Performed              Diagram Legend     Erythematous scaling macule/papule c/w actinic keratosis       Vascular papule c/w angioma      Pigmented verrucoid papule/plaque c/w seborrheic keratosis      Yellow umbilicated papule c/w sebaceous hyperplasia      Irregularly shaped tan macule c/w lentigo     1-2 mm smooth white papules consistent with Milia      Movable subcutaneous cyst with punctum c/w epidermal inclusion cyst      Subcutaneous movable cyst c/w pilar cyst      Firm pink to brown papule c/w dermatofibroma      Pedunculated fleshy papule(s) c/w skin tag(s)      Evenly pigmented macule c/w junctional nevus     Mildly variegated pigmented, slightly irregular-bordered macule c/w mildly atypical nevus      Flesh colored to evenly pigmented papule c/w intradermal nevus       Pink pearly papule/plaque c/w basal cell carcinoma      Erythematous hyperkeratotic cursted plaque c/w SCC      Surgical scar with no sign of skin cancer recurrence       Open and closed comedones      Inflammatory papules and pustules      Verrucoid papule consistent consistent with wart     Erythematous eczematous patches and plaques     Dystrophic onycholytic nail with subungual debris c/w onychomycosis     Umbilicated papule    Erythematous-base heme-crusted tan verrucoid plaque consistent with inflamed seborrheic keratosis     Erythematous Silvery Scaling Plaque c/w Psoriasis     See annotation      Assessment / Plan:        Congenital nevus, left nasal sidewall  No concerning features, reassurance    Dermatosis papulosa nigra  These are benign inherited growths without a malignant potential. Reassurance given to patient. No treatment is necessary.     Patient instructed in importance in daily sun protection of at least spf 30. Mineral sunscreen ingredients preferred (Zinc +/- Titanium).   Recommend Elta MD for daily use on face and neck.  Patient encouraged to wear hat for all outdoor exposure.   Also discussed sun avoidance and use of protective clothing.           Follow-up if symptoms worsen or fail to improve.

## 2018-12-03 ENCOUNTER — CLINICAL SUPPORT (OUTPATIENT)
Dept: REHABILITATION | Facility: HOSPITAL | Age: 48
End: 2018-12-03
Attending: INTERNAL MEDICINE
Payer: COMMERCIAL

## 2018-12-03 DIAGNOSIS — M62.89 PELVIC FLOOR DYSFUNCTION: ICD-10-CM

## 2018-12-03 DIAGNOSIS — R10.2 PELVIC PAIN: Primary | ICD-10-CM

## 2018-12-03 PROCEDURE — 97140 MANUAL THERAPY 1/> REGIONS: CPT | Mod: PN

## 2018-12-03 PROCEDURE — 97112 NEUROMUSCULAR REEDUCATION: CPT | Mod: PN

## 2018-12-03 NOTE — PROGRESS NOTES
"                                                    Physical Therapy Daily Note     Name: Caryn Zarate  Clinic Number: [id number]  Diagnosis:   Encounter Diagnoses   Name Primary?    Pelvic pain Yes    Pelvic floor dysfunction      Physician: Layla Laws MD  Precautions: Standard   Visit #: 4 (25 auth)  Time In: 1:00  Time Out: 1:55  Total Treatment Time: 55 minutes  Auth Expiration: 12/31/18  POC Expiration: 2/15/18    Subjective     Pt reports: Pt reports things are going well except that she woke up today with pain starting underneath her bra and traveling all the way down her right side with pain in her R calf. She says she thinks she overdid it taking the víctor decorations down from the attic. Pt rates her pain as a 10/10 pain.    Patient's Goals: pain relief    Objective     Caryn received individual therapeutic exercises to develop ROM for 00 minutes including:   -adductor stretch, 20" x 4 reps    -piriformis stretch, 20" x 4    -bridging x 10    +happy baby stretch    Pt received individual neuromuscular reeducation for 25 minutes including:    - Relaxation training with abdominal MHP to prepare tissue for scar mobilization and MFR  - Guided diaphragmatic breathing; pt requiring review and practice, she admits she has forgotten to perform for HEP    Pt received manual therapy for 30 minutes including: MFR applied to upper and lower abdomen, abdominal scar mobilization  NP:  -STM to the left gluteal musculature, including hip ER as well as left hip adductors   -deep pressure release at 6oclock on pelvic floor     Education provided re:     Assessment     Patient tolerated session well without visible adverse effects. Good tolerance for manual care today. Pt reports she had her first pap smear at a very young age, before she was really ready so every time she has to have one, she becomes very tense. Will provide cueing for pelvic floor relaxation at her next visit. Pt will continue to benefit from " skilled PT intervention in order to maximize pain free functional independence.    Pt is making good progress towards established goals.   No educational, cultural, or spiritual barriers to learning identified.    GOALS 2/15/18  Short Term Goals:  1. Pt will verbalize improved awareness of PFM activity as palpated by PT in order to improve activity involvement with HEP.  2. Pt will be independent with restful breathing and diaphragmatic breathing in order to improve central stabilization and pain management.  3. Pt will able to bear down gently without use of overflow mm in order to improve awareness and her pelvic floor muscles and tissue length.  4. Pt will tolerate HEP to improve impairments and independence with ADL's.     Long Term Goals:   1. Pt will be able to activate her PFM to lift/squeeze in order to improve function.  2. Pt will report decrease in pelvic pain.  3. Pt will be independent with HEP and self management.     CMS Impairment/Limitation/Restriction for PFDI Pain Survey  Status Limitation G-Code CMS Severity Modifier  Intake 17% 17% Current Status CI - At least 1 percent but less than 20 percent     Plan     Continue with established Plan of Care towards PT goals.

## 2018-12-05 ENCOUNTER — TELEPHONE (OUTPATIENT)
Dept: HEPATOLOGY | Facility: CLINIC | Age: 48
End: 2018-12-05

## 2018-12-05 NOTE — TELEPHONE ENCOUNTER
MA called pt. No answer. Mailbox is full, unable to leave message. I wanted to check with pt to make sure she meant to schedule an appt with [facility] or if she meant to schedule a fibroscan that she was scheduled for today and cancelled. EMS

## 2018-12-18 ENCOUNTER — OFFICE VISIT (OUTPATIENT)
Dept: HEPATOLOGY | Facility: CLINIC | Age: 48
End: 2018-12-18
Payer: COMMERCIAL

## 2018-12-18 ENCOUNTER — PROCEDURE VISIT (OUTPATIENT)
Dept: HEPATOLOGY | Facility: CLINIC | Age: 48
End: 2018-12-18
Attending: NURSE PRACTITIONER
Payer: COMMERCIAL

## 2018-12-18 ENCOUNTER — CLINICAL SUPPORT (OUTPATIENT)
Dept: INFECTIOUS DISEASES | Facility: CLINIC | Age: 48
End: 2018-12-18
Payer: COMMERCIAL

## 2018-12-18 VITALS
WEIGHT: 248.88 LBS | DIASTOLIC BLOOD PRESSURE: 60 MMHG | OXYGEN SATURATION: 96 % | TEMPERATURE: 98 F | HEART RATE: 76 BPM | BODY MASS INDEX: 35.63 KG/M2 | SYSTOLIC BLOOD PRESSURE: 111 MMHG | HEIGHT: 70 IN

## 2018-12-18 DIAGNOSIS — K76.0 NAFLD (NONALCOHOLIC FATTY LIVER DISEASE): ICD-10-CM

## 2018-12-18 DIAGNOSIS — Z23 NEED FOR HEPATITIS A AND B VACCINATION: ICD-10-CM

## 2018-12-18 DIAGNOSIS — K76.0 NAFLD (NONALCOHOLIC FATTY LIVER DISEASE): Primary | ICD-10-CM

## 2018-12-18 DIAGNOSIS — F32.A DEPRESSION: ICD-10-CM

## 2018-12-18 DIAGNOSIS — R73.01 IFG (IMPAIRED FASTING GLUCOSE): ICD-10-CM

## 2018-12-18 DIAGNOSIS — E66.09 CLASS 1 OBESITY DUE TO EXCESS CALORIES WITH BODY MASS INDEX (BMI) OF 34.0 TO 34.9 IN ADULT, UNSPECIFIED WHETHER SERIOUS COMORBIDITY PRESENT: ICD-10-CM

## 2018-12-18 PROCEDURE — 99999 PR PBB SHADOW E&M-EST. PATIENT-LVL I: CPT | Mod: PBBFAC,,,

## 2018-12-18 PROCEDURE — 99214 OFFICE O/P EST MOD 30 MIN: CPT | Mod: S$GLB,,, | Performed by: NURSE PRACTITIONER

## 2018-12-18 PROCEDURE — 91200 LIVER ELASTOGRAPHY: CPT | Mod: S$GLB,,, | Performed by: NURSE PRACTITIONER

## 2018-12-18 PROCEDURE — 90636 HEP A/HEP B VACC ADULT IM: CPT | Mod: S$GLB,,, | Performed by: INTERNAL MEDICINE

## 2018-12-18 PROCEDURE — 90471 IMMUNIZATION ADMIN: CPT | Mod: S$GLB,,, | Performed by: INTERNAL MEDICINE

## 2018-12-18 PROCEDURE — 3008F BODY MASS INDEX DOCD: CPT | Mod: CPTII,S$GLB,, | Performed by: NURSE PRACTITIONER

## 2018-12-18 PROCEDURE — 99999 PR PBB SHADOW E&M-EST. PATIENT-LVL IV: CPT | Mod: PBBFAC,,, | Performed by: NURSE PRACTITIONER

## 2018-12-18 RX ORDER — DULOXETIN HYDROCHLORIDE 60 MG/1
CAPSULE, DELAYED RELEASE ORAL
Qty: 30 CAPSULE | Refills: 3 | Status: SHIPPED | OUTPATIENT
Start: 2018-12-18 | End: 2020-01-06

## 2018-12-18 NOTE — PROGRESS NOTES
Patient arrives for immunization injection of  Twinrix 1st dose - administered per order - left in no apparent distress.

## 2018-12-18 NOTE — PROGRESS NOTES
[facility] Established Patient Visit    Reason for Visit: Follow-up for fatty liver      PCP: Layla Laws    HPI:  This is a 48 y.o. female here for follow-up for fatty liver. Fatty liver present on abdominal US from 3/21/18. Liver noted to be enlarged at 18.6 cm, spleen normal size at 8.3 cm. Her risk factors for fatty liver - obesity, Body mass index is 35.71 kg/m²., impaired fasting glucose (HgbA1c 5.6). She lost about 50 lb over the course of a year with exercise and portion control.    Review of labs:  - Liver enzymes: normal  - Synthetic liver function: normal  - Platelets normal, 200s  - Negative for hepatitis B and C      Today, she feels well, no complaints. Denies symptoms of hepatic decompensation including jaundice, dark urine, hematemesis, melena, slowed mentation, abdominal distention, or lower extremity edema.     She was scheduled to have Fibroscan in March, though this was not completed. Needs vaccines for Hep A and B as well.       ROS:   GENERAL: Denies fever, chills, fatigue, (+) weight loss (intentional)   HEENT: Denies headaches, dizziness, vision/hearing changes  CARDIOVASCULAR: Denies chest pain, palpitations, or edema  RESPIRATORY: Denies dyspnea, cough  GI: Denies abdominal pain, rectal bleeding, nausea, vomiting. No change in bowel pattern or color  : Denies dysuria, hematuria   SKIN: Denies rash, itching   NEURO: Denies confusion, memory loss, or mood changes  PSYCH: Denies depression or anxiety  HEME/LYMPH: Denies easy bruising or bleeding  MSK: Denies joint pain or myalgia.       PMHX:  has a past medical history of Depression, Fatty liver: see u/s 3/18 (3/21/2018), IFG (impaired fasting glucose) (3/18/2016), Obesity, Renal cyst, left: see u/s 3/18 (3/21/2018), Sleep apnea (2014), and Vitamin D deficiency disease (3/18/2016).    PSHX:  has a past surgical history that includes Laparoscopic hysterectomy (2009); Tubal ligation; Hysterectomy; and   "section.    The patient's social and family histories were reviewed by me and updated in the appropriate section of the electronic medical record.    Review of patient's allergies indicates:   Allergen Reactions    Latex, natural rubber      Current Outpatient Medications on File Prior to Visit   Medication Sig Dispense Refill    cholecalciferol, vitamin D3, 1,000 unit capsule Take 2 capsules (2,000 Units total) by mouth once daily. 60 capsule 12    DULoxetine (CYMBALTA) 60 MG capsule TAKE 1 CAPSULE(60 MG) BY MOUTH EVERY DAY 30 capsule 12    ibuprofen (ADVIL,MOTRIN) 600 MG tablet Take 1 tablet (600 mg total) by mouth every 6 (six) hours as needed for Pain (with food or milk). No more than 4 per day 30 tablet 0    multivitamin (THERAGRAN) per tablet Take 1 tablet by mouth once daily.       No current facility-administered medications on file prior to visit.      Objective Findings:    PHYSICAL EXAM:   Friendly Black or  female, in no acute distress; alert and oriented to person, place and time  VITALS: /60 (BP Location: Right leg, Patient Position: Sitting, BP Method: X-Large (Automatic))   Pulse 76   Temp 98.3 °F (36.8 °C) (Oral)   Ht 5' 10" (1.778 m)   Wt 112.9 kg (248 lb 14.4 oz)   SpO2 96%   BMI 35.71 kg/m²   HENT: Normocephalic, without obvious abnormality. Oral mucosa pink and moist.   EYES: Sclerae anicteric.   NECK: Supple. No masses or cervical adenopathy.  CARDIOVASCULAR: Regular rate and rhythm. No murmurs.  RESPIRATORY: Normal respiratory effort. BBS CTA. No wheezes or crackles.  GI: Soft, non-tender, non-distended. No palpable hepatosplenomegaly. No masses palpable. No ascites.  EXTREMITIES:  No clubbing, cyanosis or edema.  SKIN: Warm and dry. No jaundice. No rashes noted to exposed skin. No telangectasias noted. No palmar erythema.  NEURO:  Normal gait. No asterixis.  PSYCH:  Memory intact. Thought and speech pattern appropriate. Behavior normal. No depression or " anxiety noted.      Labs:  Lab Results   Component Value Date    WBC 5.89 03/08/2018    HGB 12.7 03/08/2018    HCT 37.8 03/08/2018     03/08/2018    CHOL 177 05/21/2014    TRIG 77 05/21/2014    HDL 51 05/21/2014     03/08/2018    K 3.9 03/08/2018    CREATININE 0.9 03/08/2018    ALT 15 03/08/2018    AST 21 03/08/2018    ALKPHOS 88 03/08/2018    BILITOT 0.4 03/08/2018    ALBUMIN 3.7 03/08/2018    INR 0.9 10/11/2014       Diagnostic Studies  ABD. U/S - 3/21/18    FINDINGS:  Liver: Enlarged measuring 18.6 cm. The liver is diffusely increased in echogenicity consistent with fatty changes of the liver.  No focal hepatic lesions.    Gallbladder: No calculi, wall thickening, or pericholecystic fluid.  No sonographic Mckee's sign.    Biliary system: The common duct is not dilated, measuring 4 mm.  No intrahepatic ductal dilatation.    Spleen: Normal in size with a homogeneous echotexture, measuring 8.3 cm.    Pancreas: The visualized portions of pancreas appear normal.    Right kidney: Normal in size with no hydronephrosis, measuring 10.1 x 4.4 x 6.0 cm.  No renal calculi are identified.    Left kidney:  Normal in size with no hydronephrosis, measuring 11.9 x 5.5 x 5.5 cm.  There is a 1.0 cm left upper pole renal cyst.  No renal calculi are identified.    Aorta: No aneurysm.    Inferior vena cava: Normal in appearance.    Miscellaneous: No ascites.      Impression       Moderate hepatomegaly with diffusely increased hepatic echogenicity consistent with Fatty changes of the liver.    1.0 cm left upper pole renal cyst.       ASSESSMENT:  48 y.o. Black or  female with:  1. NAFLD  -- Liver enzymes and synthetic liver function normal  -- Imaging: hepatomegaly and steatosis; spleen normal size   -- Risk factors for fatty liver include: obesity, impaired fasting glucose. Lost ~50 lb over the course of a year   -- Immunity to hepatitis A and B: needs vaccination       2. Obesity, Body mass index is  35.71 kg/m².  3. Impaired fasting glucose         EDUCATION / DISCUSSION:   We discussed the manifestations of fatty liver. At this time there is no FDA approved therapy for fatty liver. The patient and I discussed the importance of lifestyle modifications such as diet, exercise, and weight loss for management of fatty liver. We reviewed modification of risk factors such as hypertension, hyperlipidemia, and diabetes mellitus / impaired fasting glucose. Discussed that excessive alcohol consumption can also cause fatty liver. We discussed a low carb, low sugar diet and a goal of 30 minutes of exercise 5 times per week. Patient is aware that fatty liver can progress to steatohepatitis and possibly to cirrhosis, putting one at increased risk for liver cancer, liver failure, and death.       PLAN:  1. Fibroscan today for fibrosis and steatosis staging. If fibroscan does not suggest any fibrosis, recommend monitoring liver tests at least annually with PCP. Can return to hepatology clinic if liver tests become abnormal in the future.   2. Start vaccines today for hepatitis A and B in ID injection dept   3. Continue lifestyle modifications for management of fatty liver:   -- Weight loss with diet and exercise  -- Low carbohydrate, low sugar diet  -- Exercise, 5 days a week, 30 min a day  -- Recommend good control of cholesterol, blood pressure, blood sugar levels  4. Follow-up pending Fibroscan results       Thank you for allowing me to participate in the care of LUIS ANTONIO Garay-C  Hepatology and Liver Transplant      Addendum: Fibroscan kPa = 8.3, F2, moderate fibrosis. CAP = 300, S3 or >67% steatosis. Results reviewed with patient in clinic. Recommend she continue working on weight loss for management of fatty liver. Patient to return to clinic for follow-up in ~6 months.

## 2018-12-18 NOTE — PROCEDURES
Fibroscan Procedure     Name: Caryn Zarate  Date of Procedure : 2018   :: [facility]ia Sung NP  Diagnosis: NAFLD    Probe: XL    Fibroscan readin.3 KPa    Fibrosis:F2     CAP readin dB/m    Steatosis: :S3 , >67% steatosis

## 2018-12-27 ENCOUNTER — HOSPITAL ENCOUNTER (OUTPATIENT)
Facility: HOSPITAL | Age: 48
Discharge: HOME | End: 2018-12-27
Attending: COLON & RECTAL SURGERY | Admitting: COLON & RECTAL SURGERY
Payer: COMMERCIAL

## 2018-12-27 ENCOUNTER — ANESTHESIA (OUTPATIENT)
Dept: ENDOSCOPY | Facility: HOSPITAL | Age: 48
End: 2018-12-27
Payer: COMMERCIAL

## 2018-12-27 ENCOUNTER — ANESTHESIA EVENT (OUTPATIENT)
Dept: ENDOSCOPY | Facility: HOSPITAL | Age: 48
End: 2018-12-27
Payer: COMMERCIAL

## 2018-12-27 VITALS
BODY MASS INDEX: 34.5 KG/M2 | RESPIRATION RATE: 16 BRPM | HEIGHT: 70 IN | TEMPERATURE: 97 F | OXYGEN SATURATION: 100 % | SYSTOLIC BLOOD PRESSURE: 110 MMHG | HEART RATE: 60 BPM | WEIGHT: 241 LBS | DIASTOLIC BLOOD PRESSURE: 70 MMHG

## 2018-12-27 DIAGNOSIS — Z12.11 SCREENING FOR COLON CANCER: Primary | ICD-10-CM

## 2018-12-27 PROCEDURE — 63600175 PHARM REV CODE 636 W HCPCS: Performed by: NURSE ANESTHETIST, CERTIFIED REGISTERED

## 2018-12-27 PROCEDURE — E9220 PRA ENDO ANESTHESIA: HCPCS | Mod: ,,, | Performed by: NURSE ANESTHETIST, CERTIFIED REGISTERED

## 2018-12-27 PROCEDURE — G0121 COLON CA SCRN NOT HI RSK IND: HCPCS | Performed by: COLON & RECTAL SURGERY

## 2018-12-27 PROCEDURE — 25000003 PHARM REV CODE 250: Performed by: NURSE PRACTITIONER

## 2018-12-27 PROCEDURE — 37000008 HC ANESTHESIA 1ST 15 MINUTES: Performed by: COLON & RECTAL SURGERY

## 2018-12-27 PROCEDURE — 37000009 HC ANESTHESIA EA ADD 15 MINS: Performed by: COLON & RECTAL SURGERY

## 2018-12-27 PROCEDURE — G0121 COLON CA SCRN NOT HI RSK IND: HCPCS | Mod: ,,, | Performed by: COLON & RECTAL SURGERY

## 2018-12-27 RX ORDER — SODIUM CHLORIDE 9 MG/ML
INJECTION, SOLUTION INTRAVENOUS CONTINUOUS
Status: DISCONTINUED | OUTPATIENT
Start: 2018-12-27 | End: 2018-12-27 | Stop reason: HOSPADM

## 2018-12-27 RX ORDER — LIDOCAINE HCL/PF 100 MG/5ML
SYRINGE (ML) INTRAVENOUS
Status: DISCONTINUED | OUTPATIENT
Start: 2018-12-27 | End: 2018-12-27

## 2018-12-27 RX ORDER — PROPOFOL 10 MG/ML
VIAL (ML) INTRAVENOUS
Status: DISCONTINUED | OUTPATIENT
Start: 2018-12-27 | End: 2018-12-27

## 2018-12-27 RX ORDER — SODIUM CHLORIDE 0.9 % (FLUSH) 0.9 %
3 SYRINGE (ML) INJECTION
Status: DISCONTINUED | OUTPATIENT
Start: 2018-12-27 | End: 2018-12-27 | Stop reason: HOSPADM

## 2018-12-27 RX ORDER — PROPOFOL 10 MG/ML
VIAL (ML) INTRAVENOUS CONTINUOUS PRN
Status: DISCONTINUED | OUTPATIENT
Start: 2018-12-27 | End: 2018-12-27

## 2018-12-27 RX ADMIN — LIDOCAINE HYDROCHLORIDE 20 MG: 20 INJECTION, SOLUTION INTRAVENOUS at 10:12

## 2018-12-27 RX ADMIN — PROPOFOL 60 MG: 10 INJECTION, EMULSION INTRAVENOUS at 10:12

## 2018-12-27 RX ADMIN — PROPOFOL 40 MG: 10 INJECTION, EMULSION INTRAVENOUS at 10:12

## 2018-12-27 RX ADMIN — PROPOFOL 150 MCG/KG/MIN: 10 INJECTION, EMULSION INTRAVENOUS at 10:12

## 2018-12-27 RX ADMIN — SODIUM CHLORIDE: 0.9 INJECTION, SOLUTION INTRAVENOUS at 09:12

## 2018-12-27 NOTE — ANESTHESIA PREPROCEDURE EVALUATION
Past Medical History:   Diagnosis Date    Depression     Fatty liver: see u/s 3/18 3/21/2018    IFG (impaired fasting glucose) 3/18/2016    Obesity     Renal cyst, left: see u/s 3/18 3/21/2018    Sleep apnea 2014    Vitamin D deficiency disease 3/18/2016     Past Surgical History:   Procedure Laterality Date     SECTION      HYSTERECTOMY      ovaries remain    LAPAROSCOPIC HYSTERECTOMY  2009    [facility], for uterine fibroids    TUBAL LIGATION       Patient Active Problem List   Diagnosis    Recurrent major depressive disorder, in partial remission    Sleep apnea: needs CPAP 10 per sleep study - declines tx    IFG (impaired fasting glucose)    Vitamin D deficiency disease    Fatty liver: see u/s 3/18    Renal cyst, left: see u/s 3/18    Pelvic pain    Pelvic floor dysfunction    Class 1 obesity due to excess calories with body mass index (BMI) of 34.0 to 34.9 in adult     There is no height or weight on file to calculate BMI.  2D Echo:  No results found for this or any previous visit.    Please See ROS/PMH and Active Problem List above                                                                                                          2018  Caryn Zarate is a 48 y.o., female.    Anesthesia Evaluation    I have reviewed the Patient Summary Reports.    I have reviewed the Nursing Notes.   I have reviewed the Medications.     Review of Systems  Hematology/Oncology:  Hematology Normal   Oncology Normal     EENT/Dental:EENT/Dental Normal   Cardiovascular:  Cardiovascular Normal     Pulmonary:  Pulmonary Normal    Hepatic/GI:  Hepatic/GI Normal    Musculoskeletal:  Musculoskeletal Normal    Neurological:  Neurology Normal    Endocrine:  Endocrine Normal    Dermatological:  Skin Normal        Physical Exam  General:  Well nourished    Airway/Jaw/Neck:  Airway Findings: Mouth Opening: Normal Tongue: Normal  General Airway Assessment: Adult  Mallampati: II  TM Distance:  Normal, at least 6 cm        Eyes/Ears/Nose:  EYES/EARS/NOSE FINDINGS: Normal   Dental:  DENTAL FINDINGS: Normal        Mental Status:  Mental Status Findings: Normal        Anesthesia Plan  Type of Anesthesia, risks & benefits discussed:  Anesthesia Type:  general  Patient's Preference: general  Intra-op Monitoring Plan: standard ASA monitors  Intra-op Monitoring Plan Comments:   Post Op Pain Control Plan: multimodal analgesia  Post Op Pain Control Plan Comments:   Induction:   IV  Beta Blocker:         Informed Consent: Patient understands risks and agrees with Anesthesia plan.  Questions answered. Anesthesia consent signed with patient.  ASA Score: 2     Day of Surgery Review of History & Physical: I have interviewed and examined the patient. I have reviewed the patient's H&P dated:  There are no significant changes. Significant changes noted: Surgeon notified.  H&P update referred to the provider.         Ready For Surgery From Anesthesia Perspective.

## 2018-12-27 NOTE — TRANSFER OF CARE
"Anesthesia Transfer of Care Note    Patient: Caryn Zarate    Procedure(s) Performed: Procedure(s) (LRB):  COLONOSCOPY (N/A)    Patient location: PACU    Anesthesia Type: general    Transport from OR: Transported from OR on room air with adequate spontaneous ventilation    Post pain: adequate analgesia    Post assessment: no apparent anesthetic complications    Post vital signs: stable    Level of consciousness: awake, alert and oriented    Nausea/Vomiting: no nausea/vomiting    Complications: none    Transfer of care protocol was followed      Last vitals:   Visit Vitals  /75 (BP Location: Left arm, Patient Position: Sitting)   Pulse 61   Temp 36.3 °C (97.3 °F) (Temporal)   Resp 16   Ht 5' 10" (1.778 m)   Wt 109.3 kg (241 lb)   SpO2 100%   Breastfeeding? No   BMI 34.58 kg/m²     "

## 2018-12-27 NOTE — ANESTHESIA POSTPROCEDURE EVALUATION
"Anesthesia Post Evaluation    Patient: Caryn Zarate    Procedure(s) Performed: Procedure(s) (LRB):  COLONOSCOPY (N/A)    Final Anesthesia Type: general  Patient location during evaluation: PACU  Patient participation: Yes- Able to Participate  Level of consciousness: awake and alert  Post-procedure vital signs: reviewed and stable  Pain management: adequate  Airway patency: patent  PONV status at discharge: No PONV  Anesthetic complications: no      Cardiovascular status: blood pressure returned to baseline  Respiratory status: unassisted  Hydration status: euvolemic  Follow-up not needed.        Visit Vitals  /70 (BP Location: Left arm, Patient Position: Lying)   Pulse 60   Temp 36.3 °C (97.3 °F) (Skin)   Resp 16   Ht 5' 10" (1.778 m)   Wt 109.3 kg (241 lb)   SpO2 100%   Breastfeeding? No   BMI 34.58 kg/m²       Pain/Wilfred Score: Wilfred Score: 10 (12/27/2018 11:03 AM)        "

## 2018-12-27 NOTE — PROVATION PATIENT INSTRUCTIONS
Discharge Summary/Instructions after an Endoscopic Procedure  Patient Name: Caryn Zarate  Patient MRN: [id number]  Patient YOB: 1970 Thursday, December 27, 2018  Milton Park MD  RESTRICTIONS:  During your procedure today, you received medications for sedation.  These   medications may affect your judgment, balance and coordination.  Therefore,   for 24 hours, you have the following restrictions:   - DO NOT drive a car, operate machinery, make legal/financial decisions,   sign important papers or drink alcohol.    ACTIVITY:  Today: no heavy lifting, straining or running due to procedural   sedation/anesthesia.  The following day: return to full activity including work.  DIET:  Eat and drink normally unless instructed otherwise.     TREATMENT FOR COMMON SIDE EFFECTS:  - Mild abdominal pain, nausea, belching, bloating or excessive gas:  rest,   eat lightly and use a heating pad.  - Sore Throat: treat with throat lozenges and/or gargle with warm salt   water.  - Because air was used during the procedure, expelling large amounts of air   from your rectum or belching is normal.  - If a bowel prep was taken, you may not have a bowel movement for 1-3 days.    This is normal.  SYMPTOMS TO WATCH FOR AND REPORT TO YOUR PHYSICIAN:  1. Abdominal pain or bloating, other than gas cramps.  2. Chest pain.  3. Back pain.  4. Signs of infection such as: chills or fever occurring within 24 hours   after the procedure.  5. Rectal bleeding, which would show as bright red, maroon, or black stools.   (A tablespoon of blood from the rectum is not serious, especially if   hemorrhoids are present.)  6. Vomiting.  7. Weakness or dizziness.  GO DIRECTLY TO THE NEAREST EMERGENCY ROOM IF YOU HAVE ANY OF THE FOLLOWING:      Difficulty breathing              Chills and/or fever over 101 F   Persistent vomiting and/or vomiting blood   Severe abdominal pain   Severe chest pain   Black, tarry stools   Bleeding- more than one tablespoon   Any  other symptom or condition that you feel may need urgent attention  Your doctor recommends these additional instructions:  If any biopsies were taken, your doctors clinic will contact you in 1 to 2   weeks with any results.  - Discharge patient to home (ambulatory).   - Repeat colonoscopy in 3 years for screening purposes due to poor prep  For questions, problems or results please call your physician - Milton Park MD at Work:  [phone].  [facility], EMERGENCY ROOM PHONE NUMBER: [phone]  IF A COMPLICATION OR EMERGENCY SITUATION ARISES AND YOU ARE UNABLE TO REACH   YOUR PHYSICIAN - GO DIRECTLY TO THE EMERGENCY ROOM.  Milton Park MD  12/27/2018 10:33:20 AM  This report has been verified and signed electronically.  [employer]

## 2018-12-27 NOTE — DISCHARGE INSTRUCTIONS
Colonoscopy     A camera attached to a flexible tube with a viewing lens is used to take video pictures.     Colonoscopy is a test to view the inside of your lower digestive tract (colon and rectum). Sometimes it can show the last part of the small intestine (ileum). During the test, small pieces of tissue may be removed for testing. This is called a biopsy. Small growths, such as polyps, may also be removed.   Why is colonoscopy done?  The test is done to help look for colon cancer. And it can help find the source of abdominal pain, bleeding, and changes in bowel habits. It may be needed once a year, depending on factors such as your:  · Age  · Health history  · Family health history  · Symptoms  · Results from any prior colonoscopy  Risks and possible complications  These include:  · Bleeding               · A puncture or tear in the colon   · Risks of anesthesia  · A cancer lesion not being seen  Getting ready   To prepare for the test:  · Talk with your healthcare provider about the risks of the test (see below). Also ask your healthcare provider about alternatives to the test.  · Tell your healthcare provider about any medicines you take. Also tell him or her about any health conditions you may have.  · Make sure your rectum and colon are empty for the test. Follow the diet and bowel prep instructions exactly. If you dont, the test may need to be rescheduled.  · Plan for a friend or family member to drive you home after the test.     Colonoscopy provides an inside view of the entire colon.     You may discuss the results with your doctor right away or at a future visit.  During the test   The test is usually done in the hospital on an outpatient basis. This means you go home the same day. The procedure takes about 30 minutes. During that time:  · You are given relaxing (sedating) medicine through an IV line. You may be drowsy, or fully asleep.  · The healthcare provider will first give you a physical exam to  check for anal and rectal problems.  · Then the anus is lubricated and the scope inserted.  · If you are awake, you may have a feeling similar to needing to have a bowel movement. You may also feel pressure as air is pumped into the colon. Its OK to pass gas during the procedure.  · Biopsy, polyp removal, or other treatments may be done during the test.  After the test   You may have gas right after the test. It can help to try to pass it to help prevent later bloating. Your healthcare provider may discuss the results with you right away. Or you may need to schedule a follow-up visit to talk about the results. After the test, you can go back to your normal eating and other activities. You may be tired from the sedation and need to rest for a few hours.  Date Last Reviewed: 11/1/2016 © 2000-2017 [employer]. [address] [place] [address], [place] [address]. All rights reserved. This information is not intended as a substitute for professional medical care. Always follow your healthcare professional's instructions.

## 2018-12-27 NOTE — H&P
Colonoscopy History and Physical      Procedure : Colonoscopy    Indications:  asymptomatic screening exam    Family Hx of CRC: denies    Last Colonoscopy:  denies    Hx of sedation problems: none  FHX of sedation problems: none    Past Medical History:   Diagnosis Date    Depression     Fatty liver: see u/s 3/18 3/21/2018    IFG (impaired fasting glucose) 3/18/2016    Obesity     Renal cyst, left: see u/s 3/18 3/21/2018    Sleep apnea 2014    Vitamin D deficiency disease 3/18/2016       Past Surgical History:   Procedure Laterality Date     SECTION      HYSTERECTOMY      ovaries remain    LAPAROSCOPIC HYSTERECTOMY  2009    [facility], for uterine fibroids    TUBAL LIGATION         Review of patient's allergies indicates:   Allergen Reactions    Latex, natural rubber        No current facility-administered medications on file prior to encounter.      Current Outpatient Medications on File Prior to Encounter   Medication Sig Dispense Refill    cholecalciferol, vitamin D3, 1,000 unit capsule Take 2 capsules (2,000 Units total) by mouth once daily. 60 capsule 12    multivitamin (THERAGRAN) per tablet Take 1 tablet by mouth once daily.      ibuprofen (ADVIL,MOTRIN) 600 MG tablet Take 1 tablet (600 mg total) by mouth every 6 (six) hours as needed for Pain (with food or milk). No more than 4 per day 30 tablet 0       Family History   Problem Relation Age of Onset    Hypertension Mother     Diabetes Father     Kidney disease Father         Dialysis    Hypertension Father     Cancer Maternal Grandmother         breast    Breast cancer Maternal Grandmother     No Known Problems Daughter     No Known Problems Daughter     Breast cancer Unknown     Colon cancer Neg Hx     Ovarian cancer Neg Hx     Melanoma Neg Hx     Psoriasis Neg Hx     Lupus Neg Hx     Cirrhosis Neg Hx        Social History     Socioeconomic History    Marital status:      Spouse name: Not on file     Number of children: 2    Years of education: Not on file    Highest education level: Not on file   Social Needs    Financial resource strain: Not on file    Food insecurity - worry: Not on file    Food insecurity - inability: Not on file    Transportation needs - medical: Not on file    Transportation needs - non-medical: Not on file   Occupational History     Employer: [employer]   Tobacco Use    Smoking status: Never Smoker    Smokeless tobacco: Never Used   Substance and Sexual Activity    Alcohol use: Yes     Comment: rarely    Drug use: No    Sexual activity: Yes     Partners: Male     Birth control/protection: Surgical   Other Topics Concern    Are you pregnant or think you may be? Not Asked    Breast-feeding Not Asked   Social History Narrative    Not on file       Review of Systems -   Respiratory ROS: negative  Cardiovascular ROS: negative  Gastrointestinal ROS: negative  Musculoskeletal ROS: negative  Neurological ROS: negative    Physical Exam:  General: no distress  Head: normocephalic  Oropharynx clear, Mallampati   Lungs:  normal respiratory effort  Heart: regular rate  Abdomen: soft,  Non-tender  Extremities: warm and well perfused  Neuro awake and alert    ASA: II    Patient cleared for Anesthesia:  MAC    Anesthesia/Surgery risks, benefits, and alternative options discussed and understood by patient/family.

## 2019-01-03 ENCOUNTER — TELEPHONE (OUTPATIENT)
Dept: ENDOSCOPY | Facility: HOSPITAL | Age: 49
End: 2019-01-03

## 2019-01-18 ENCOUNTER — CLINICAL SUPPORT (OUTPATIENT)
Dept: INFECTIOUS DISEASES | Facility: CLINIC | Age: 49
End: 2019-01-18
Payer: COMMERCIAL

## 2019-01-18 DIAGNOSIS — Z23 NEED FOR HEPATITIS A AND B VACCINATION: ICD-10-CM

## 2019-01-18 DIAGNOSIS — K76.0 NAFLD (NONALCOHOLIC FATTY LIVER DISEASE): ICD-10-CM

## 2019-01-18 PROCEDURE — 90471 HEPATITIS A HEPATITIS B COMBINED VACCINE IM: ICD-10-PCS | Mod: S$GLB,,, | Performed by: INTERNAL MEDICINE

## 2019-01-18 PROCEDURE — 90636 HEPATITIS A HEPATITIS B COMBINED VACCINE IM: ICD-10-PCS | Mod: S$GLB,,, | Performed by: INTERNAL MEDICINE

## 2019-01-18 PROCEDURE — 90636 HEP A/HEP B VACC ADULT IM: CPT | Mod: S$GLB,,, | Performed by: INTERNAL MEDICINE

## 2019-01-18 PROCEDURE — 99999 PR PBB SHADOW E&M-EST. PATIENT-LVL I: CPT | Mod: PBBFAC,,,

## 2019-01-18 PROCEDURE — 90471 IMMUNIZATION ADMIN: CPT | Mod: S$GLB,,, | Performed by: INTERNAL MEDICINE

## 2019-01-18 PROCEDURE — 99999 PR PBB SHADOW E&M-EST. PATIENT-LVL I: ICD-10-PCS | Mod: PBBFAC,,,

## 2019-06-18 ENCOUNTER — CLINICAL SUPPORT (OUTPATIENT)
Dept: INFECTIOUS DISEASES | Facility: CLINIC | Age: 49
End: 2019-06-18
Payer: COMMERCIAL

## 2019-06-18 DIAGNOSIS — Z23 NEED FOR HEPATITIS A AND B VACCINATION: ICD-10-CM

## 2019-06-18 DIAGNOSIS — K76.0 NAFLD (NONALCOHOLIC FATTY LIVER DISEASE): ICD-10-CM

## 2019-06-18 PROCEDURE — 90471 HEPATITIS A HEPATITIS B COMBINED VACCINE IM: ICD-10-PCS | Mod: S$GLB,,, | Performed by: INTERNAL MEDICINE

## 2019-06-18 PROCEDURE — 99999 PR PBB SHADOW E&M-EST. PATIENT-LVL I: ICD-10-PCS | Mod: PBBFAC,,,

## 2019-06-18 PROCEDURE — 90636 HEPATITIS A HEPATITIS B COMBINED VACCINE IM: ICD-10-PCS | Mod: S$GLB,,, | Performed by: INTERNAL MEDICINE

## 2019-06-18 PROCEDURE — 90636 HEP A/HEP B VACC ADULT IM: CPT | Mod: S$GLB,,, | Performed by: INTERNAL MEDICINE

## 2019-06-18 PROCEDURE — 90471 IMMUNIZATION ADMIN: CPT | Mod: S$GLB,,, | Performed by: INTERNAL MEDICINE

## 2019-06-18 PROCEDURE — 99999 PR PBB SHADOW E&M-EST. PATIENT-LVL I: CPT | Mod: PBBFAC,,,

## 2019-11-08 ENCOUNTER — PATIENT OUTREACH (OUTPATIENT)
Dept: ADMINISTRATIVE | Facility: HOSPITAL | Age: 49
End: 2019-11-08

## 2019-11-08 ENCOUNTER — PATIENT MESSAGE (OUTPATIENT)
Dept: ADMINISTRATIVE | Facility: HOSPITAL | Age: 49
End: 2019-11-08

## 2019-11-08 DIAGNOSIS — Z12.31 ENCOUNTER FOR SCREENING MAMMOGRAM FOR BREAST CANCER: Primary | ICD-10-CM

## 2019-11-16 DIAGNOSIS — F32.A DEPRESSION: ICD-10-CM

## 2019-11-18 RX ORDER — DULOXETIN HYDROCHLORIDE 60 MG/1
CAPSULE, DELAYED RELEASE ORAL
Qty: 30 CAPSULE | Refills: 0 | Status: SHIPPED | OUTPATIENT
Start: 2019-11-18 | End: 2019-12-11 | Stop reason: SDUPTHER

## 2019-12-11 ENCOUNTER — OFFICE VISIT (OUTPATIENT)
Dept: INTERNAL MEDICINE | Facility: CLINIC | Age: 49
End: 2019-12-11
Payer: COMMERCIAL

## 2019-12-11 ENCOUNTER — HOSPITAL ENCOUNTER (OUTPATIENT)
Dept: RADIOLOGY | Facility: HOSPITAL | Age: 49
Discharge: HOME | End: 2019-12-11
Attending: INTERNAL MEDICINE
Payer: COMMERCIAL

## 2019-12-11 ENCOUNTER — PATIENT MESSAGE (OUTPATIENT)
Dept: INTERNAL MEDICINE | Facility: CLINIC | Age: 49
End: 2019-12-11

## 2019-12-11 VITALS — BODY MASS INDEX: 34.57 KG/M2 | WEIGHT: 240.94 LBS

## 2019-12-11 VITALS
BODY MASS INDEX: 37.24 KG/M2 | DIASTOLIC BLOOD PRESSURE: 75 MMHG | WEIGHT: 260.13 LBS | HEIGHT: 70 IN | SYSTOLIC BLOOD PRESSURE: 125 MMHG

## 2019-12-11 DIAGNOSIS — E66.01 SEVERE OBESITY (BMI 35.0-35.9 WITH COMORBIDITY): ICD-10-CM

## 2019-12-11 DIAGNOSIS — K76.0 FATTY LIVER: ICD-10-CM

## 2019-12-11 DIAGNOSIS — F33.41 RECURRENT MAJOR DEPRESSIVE DISORDER, IN PARTIAL REMISSION: ICD-10-CM

## 2019-12-11 DIAGNOSIS — Z00.00 ANNUAL PHYSICAL EXAM: Primary | ICD-10-CM

## 2019-12-11 DIAGNOSIS — Z12.31 ENCOUNTER FOR SCREENING MAMMOGRAM FOR BREAST CANCER: ICD-10-CM

## 2019-12-11 DIAGNOSIS — M54.31 SCIATICA OF RIGHT SIDE: ICD-10-CM

## 2019-12-11 PROBLEM — E66.09 CLASS 1 OBESITY DUE TO EXCESS CALORIES WITH BODY MASS INDEX (BMI) OF 34.0 TO 34.9 IN ADULT: Status: RESOLVED | Noted: 2018-10-30 | Resolved: 2019-12-11

## 2019-12-11 PROBLEM — E66.811 CLASS 1 OBESITY DUE TO EXCESS CALORIES WITH BODY MASS INDEX (BMI) OF 34.0 TO 34.9 IN ADULT: Status: RESOLVED | Noted: 2018-10-30 | Resolved: 2019-12-11

## 2019-12-11 PROBLEM — R10.2 PELVIC PAIN: Status: RESOLVED | Noted: 2018-10-15 | Resolved: 2019-12-11

## 2019-12-11 PROBLEM — Z12.11 SCREENING FOR COLON CANCER: Status: RESOLVED | Noted: 2018-12-27 | Resolved: 2019-12-11

## 2019-12-11 PROCEDURE — 99999 PR PBB SHADOW E&M-EST. PATIENT-LVL V: CPT | Mod: PBBFAC,,, | Performed by: INTERNAL MEDICINE

## 2019-12-11 PROCEDURE — 99999 PR PBB SHADOW E&M-EST. PATIENT-LVL V: ICD-10-PCS | Mod: PBBFAC,,, | Performed by: INTERNAL MEDICINE

## 2019-12-11 PROCEDURE — 77067 MAMMO DIGITAL SCREENING BILAT WITH TOMOSYNTHESIS_CAD: ICD-10-PCS | Mod: 26,,, | Performed by: RADIOLOGY

## 2019-12-11 PROCEDURE — 72100 X-RAY EXAM L-S SPINE 2/3 VWS: CPT | Mod: 26,,, | Performed by: RADIOLOGY

## 2019-12-11 PROCEDURE — 77063 MAMMO DIGITAL SCREENING BILAT WITH TOMOSYNTHESIS_CAD: ICD-10-PCS | Mod: 26,,, | Performed by: RADIOLOGY

## 2019-12-11 PROCEDURE — 77063 BREAST TOMOSYNTHESIS BI: CPT | Mod: 26,,, | Performed by: RADIOLOGY

## 2019-12-11 PROCEDURE — 99396 PREV VISIT EST AGE 40-64: CPT | Mod: S$GLB,,, | Performed by: INTERNAL MEDICINE

## 2019-12-11 PROCEDURE — 72100 XR LUMBAR SPINE AP AND LATERAL: ICD-10-PCS | Mod: 26,,, | Performed by: RADIOLOGY

## 2019-12-11 PROCEDURE — 99396 PR PREVENTIVE VISIT,EST,40-64: ICD-10-PCS | Mod: S$GLB,,, | Performed by: INTERNAL MEDICINE

## 2019-12-11 PROCEDURE — 77067 SCR MAMMO BI INCL CAD: CPT | Mod: 26,,, | Performed by: RADIOLOGY

## 2019-12-11 PROCEDURE — 72100 X-RAY EXAM L-S SPINE 2/3 VWS: CPT | Mod: TC

## 2019-12-11 PROCEDURE — 77067 SCR MAMMO BI INCL CAD: CPT | Mod: TC

## 2019-12-11 RX ORDER — IBUPROFEN 600 MG/1
600 TABLET ORAL EVERY 6 HOURS PRN
Qty: 30 TABLET | Refills: 0 | Status: SHIPPED | OUTPATIENT
Start: 2019-12-11 | End: 2021-01-11

## 2019-12-11 RX ORDER — METHOCARBAMOL 500 MG/1
500 TABLET, FILM COATED ORAL 4 TIMES DAILY
Qty: 40 TABLET | Refills: 0 | Status: SHIPPED | OUTPATIENT
Start: 2019-12-11 | End: 2019-12-21

## 2019-12-11 NOTE — PATIENT INSTRUCTIONS
Back Exercises: Abdominal Lift Brace with Marching    The abdominal lift brace with march strengthens your lower abdominal muscles, helping you keep your pelvis and back stable:  · Lie on the floor with both knees bent. Put your feet flat on the floor and your arms by your sides. Tighten your abdominal muscles. Be sure to continue to breathe.  · Lift one bent knee about 2 inches then return it to the floor and lift the other about 2 inches. Keep your abdominal muscles tight and continue to breathe. These motions should be slow and controlled without your pelvis rocking side to side.  · Repeat 10 times.  Date Last Reviewed: 8/16/2015  © 8286-4250 [employer]. [address] [place] [address]. All rights reserved. This information is not intended as a substitute for professional medical care. Always follow your healthcare professional's instructions.        Back Exercises: Arm Reach    Do this exercise on your hands and knees. Keep your knees under your hips and your hands under your shoulders. Keep your spine in a neutral position (not arched or sagging). Be sure to maintain your necks natural curve:  · Stretch one arm straight out in front of you. Dont raise your head or let your supporting shoulder sag.  · Hold for 5 seconds.  · Return to starting position.  · Repeat 5 times.  · Switch arms.  Date Last Reviewed: 8/16/2015  © 5084-2023 [employer]. [address] [place] [address]. All rights reserved. This information is not intended as a substitute for professional medical care. Always follow your healthcare professional's instructions.        Back Exercises: Back Press    Do this exercise on your hands and knees. Keep your knees under your hips and your hands under your shoulders. Keep your spine in a neutral position (not arched or sagging). Be sure to maintain your necks natural curve:  · Tighten your stomach and buttock muscles to press your back upward. Let  your head drop slightly.  · Hold for 5 seconds. Return to starting position.  · Repeat 5 times.  Date Last Reviewed: 10/11/2015  © 7176-6070 [employer]. [address] [place] [address]. All rights reserved. This information is not intended as a substitute for professional medical care. Always follow your healthcare professional's instructions.        Back Exercises: Bridge  The bridge exercise strengthens your abdominal, buttock, and hamstring muscles. This helps keep your back stable and aligned when you walk.  · Lie on the floor with your back and palms flat. Bend your knees. Keep your feet flat on the floor.  · Contract your abdominal and buttock muscles. Slowly lift your buttocks off the floor until there is a straight line from your knees to your shoulders.  · Hold for 5 to 15  seconds. Repeat 5 to10 times.    Date Last Reviewed: 8/31/2015  © 5435-7168 [employer]. [address] [place] [address]. All rights reserved. This information is not intended as a substitute for professional medical care. Always follow your healthcare professional's instructions.        Back Exercises: Back Extension with Elbow Press    To start, lie face down on your stomach, feet slightly apart, forehead on the floor. Breathe deeply. You should feel comfortable and relaxed in this position.  · Press up on your forearms. Keep your stomach and hips on the floor. Stay within your painfree range.  · Hold for 20 seconds. Lower slowly.  · Repeat 2 times.  · Return to starting position.  Date Last Reviewed: 10/13/2015  © 4443-3906 [employer]. [address] [place] [address]. All rights reserved. This information is not intended as a substitute for professional medical care. Always follow your healthcare professional's instructions.        Back Exercises: Knee Lift         To start, lie on your back with your knees bent and feet flat on the floor. Dont press your neck  or lower back to the floor. Breathe deeply. You should feel comfortable and relaxed in this position:  · Start by tightening your abdominal muscles.  · Lift one bent knee off the floor 2 to 4 inches.  · Hold for 10 seconds. Return to start position.  · Repeat 3 times.  · Switch legs.  Date Last Reviewed: 8/16/2015  © 6438-5325 [employer]. [address] [place] [address]. All rights reserved. This information is not intended as a substitute for professional medical care. Always follow your healthcare professional's instructions.        Back Exercises: Leg Pull    To start, lie on your back with your knees bent and feet flat on the floor. Dont press your neck or lower back to the floor. Breathe deeply. You should feel comfortable and relaxed in this position.  · Pull one knee to your chest.  · Hold for 30 to 60 seconds. Return to starting position.  · Repeat 2 times.  · Switch legs.  · For a double leg pull, pull both legs to your chest at the same time. Repeat 2 times.  For your safety, check with your healthcare provider before starting an exercise program.   Date Last Reviewed: 8/16/2015 © 2000-2017 [employer]. [address] [place] [address]. All rights reserved. This information is not intended as a substitute for professional medical care. Always follow your healthcare professional's instructions.        Back Exercises: Leg Reach         Do this exercise on your hands and knees. Keep your knees under your hips and your hands under your shoulders. Keep your spine in a neutral position (not arched or sagging). Be sure to maintain your necks natural curve:  · Extend one leg straight back. Dont arch your back or let your head or body sag.  · Hold for 5 seconds. Return to starting position.  · Repeat 5 times.  · Switch legs.   Date Last Reviewed: 8/16/2015  © 5979-1527 [employer]. [address] [place] [address]. All rights reserved. This  information is not intended as a substitute for professional medical care. Always follow your healthcare professional's instructions.        Back Exercises: Lower Back Rotation    To start, lie on your back with your knees bent and feet flat on the floor. Dont press your neck or lower back to the floor. Breathe deeply. You should feel comfortable and relaxed in this position.  · Drop both knees to one side. Turn your head to the other side. Keep your shoulders flat on the floor.  · Do not push through pain.  · Hold for 20 seconds.  · Slowly switch sides.  · Repeat 2 to 5 times.  Date Last Reviewed: 10/11/2015  © 2790-5421 [employer]. [address] [place] [address]. All rights reserved. This information is not intended as a substitute for professional medical care. Always follow your healthcare professional's instructions.        Back Exercises: Partial Curl-Ups    To start, lie on your back with your knees bent and feet flat on the floor. Dont press your neck or lower back to the floor. Breathe deeply. You should feel comfortable and relaxed in this position:  · Cross your arms loosely.  · Tighten your abdomen and curl senior care up, keeping your head in line with your shoulders.  · Hold for 5 seconds. Uncurl to lie down.  · Repeat 2 sets of 10.   Date Last Reviewed: 8/16/2015  © 0908-9698 [employer]. [address] [place] [address]. All rights reserved. This information is not intended as a substitute for professional medical care. Always follow your healthcare professional's instructions.        Hamstring Stretch    Begin your rehabilitation with exercises that develop muscle control. These help you meet basic goals, like driving a car or going back to work. Exercise as often as youre advised. But stop right away if any exercise causes sharp or increasing pain. Icing your knee for 15 to 20 minutes after exercise can help prevent swelling and soreness.  · Lie on  your back with your good knee bent. Put a towel around the back of your injured leg. Tighten your stomach muscles.  · Keeping the knee as straight as you can, slowly pull on the towel to bring your injured leg up. Raise it as far as you comfortably can.  · Hold for 30 to 60 seconds. Repeat 2 to 3 times.   Caution: If you feel tingling or pain in your back or legs, youre not yet ready for this exercise or are pulling too aggressively.   For your safety, check with your healthcare provider before starting an exercise program.   Date Last Reviewed: 8/16/2015  © 2923-0563 [employer]. [address] [place] [address], [place] [address]. All rights reserved. This information is not intended as a substitute for professional medical care. Always follow your healthcare professional's instructions.

## 2019-12-11 NOTE — PROGRESS NOTES
Subjective:       Patient ID: Caryn Zarate is a 49 y.o. female.    Chief Complaint: Annual Exam    Annual exam and UC concern    R leg and R foot go numb 5 minutes after she starts walking.  Had similar problems in 2018, had PT, and now sx have recurred and have been persistent for 2 months.    Thought it was sciatica, tried stretching and no help.    Had lost weight and gained it back.    No L leg sx.  No fever, chills, sweats or bladder sx.    Now working as an [employer] , spent a lot of time sitting, lifting heavy luggage on occasion.  No obvious trauma or injury recently, but is lifting luggage and sitting for great deal of time.    Patient Active Problem List:     Recurrent major depressive disorder, in partial remission     Sleep apnea: needs CPAP 10 per sleep study 6/14- declines tx     IFG (impaired fasting glucose)     Vitamin D deficiency disease     Fatty liver: see u/s 3/18: fibroscan 12/18 F2S3     Renal cyst, left: see u/s 3/18     Pelvic floor dysfunction      Review of Systems   Constitutional: Negative for fatigue and fever.   Respiratory: Negative for cough and shortness of breath.    Cardiovascular: Negative for chest pain.   Gastrointestinal: Negative for abdominal pain.   Genitourinary: Negative for difficulty urinating and hematuria.   Musculoskeletal: Positive for back pain.        Leg symptoms, see above   Skin: Negative for rash.   Neurological: Positive for numbness. Negative for weakness.       Objective:      Physical Exam   Constitutional: She is oriented to person, place, and time. She appears well-developed and well-nourished.   HENT:   Head: Normocephalic and atraumatic.   Neck: Normal range of motion. Neck supple.   Cardiovascular: Normal rate and regular rhythm.   No murmur heard.  Pulmonary/Chest: Effort normal and breath sounds normal. No respiratory distress. She has no wheezes.   Abdominal: Soft. She exhibits no distension.   Musculoskeletal:   No CVA pain.  Positive SLR on R,  negative on L.  Strength UE and LE wnl.  No pain over spine   Neurological: She is oriented to person, place, and time. She displays normal reflexes. No cranial nerve deficit.   Skin: Skin is warm and dry.   Psychiatric: She has a normal mood and affect. Her behavior is normal.       Assessment:       1. Annual physical exam    2. Recurrent major depressive disorder, in partial remission    3. Fatty liver: see u/s 3/18: fibroscan 12/18 F2S3    4. Sciatica of right side    5. Severe obesity (BMI 35.0-35.9 with comorbidity)        Plan:         Caryn was seen today for annual exam.    Diagnoses and all orders for this visit:    Annual physical exam  -     CBC auto differential; Future  -     Comprehensive metabolic panel; Future  -     Lipid panel; Future  -     TSH; Future  -     Vitamin D; Future    Recurrent major depressive disorder, in partial remission:  Stable on regimen    Fatty liver: see u/s 3/18: fibroscan 12/18 F2S3  -     Ambulatory Referral to Hepatology    Sciatica of right side:  Ice, low-dose anti-inflammatories.  Natural history reviewed.  Sparing amounts of anti-inflammatories and muscle relaxant.  Avoid prolonged sitting.  Gentle stretches, she has already done physical therapy.  -     Ambulatory Consult to [facility]  -     X-Ray Lumbar Spine Ap And Lateral  -     MRI Lumbar Spine Without Contrast; Future  -     ibuprofen (ADVIL,MOTRIN) 600 MG tablet; Take 1 tablet (600 mg total) by mouth every 6 (six) hours as needed for Pain (with food or milk). No more than 4 per day    Severe obesity (BMI 35.0-35.9 with comorbidity); continue to work on portion control, exercise as tolerated.  Sleep apnea issues discussed, she is not interested in pursuing further assessment.  Does not desire bariatric assessment or dietitian.    Other orders  -     methocarbamol (ROBAXIN) 500 MG Tab; Take 1 tablet (500 mg total) by mouth 4 (four) times daily. for 10 days    Mammogram was done today  Labs and  review  X-rays and review  She declines immunizations

## 2019-12-12 ENCOUNTER — TELEPHONE (OUTPATIENT)
Dept: INTERNAL MEDICINE | Facility: CLINIC | Age: 49
End: 2019-12-12

## 2019-12-12 ENCOUNTER — PATIENT MESSAGE (OUTPATIENT)
Dept: INTERNAL MEDICINE | Facility: CLINIC | Age: 49
End: 2019-12-12

## 2019-12-12 DIAGNOSIS — E55.9 VITAMIN D DEFICIENCY DISEASE: ICD-10-CM

## 2019-12-12 DIAGNOSIS — E78.2 MIXED HYPERLIPIDEMIA: Primary | ICD-10-CM

## 2019-12-12 RX ORDER — ERGOCALCIFEROL 1.25 MG/1
50000 CAPSULE ORAL
Qty: 12 CAPSULE | Refills: 3 | Status: SHIPPED | OUTPATIENT
Start: 2019-12-12 | End: 2021-12-07

## 2019-12-12 RX ORDER — VIT C/E/ZN/COPPR/LUTEIN/ZEAXAN 250MG-90MG
2000 CAPSULE ORAL DAILY
Qty: 60 CAPSULE | Refills: 12 | Status: SHIPPED | OUTPATIENT
Start: 2019-12-12 | End: 2020-02-03

## 2019-12-18 ENCOUNTER — HOSPITAL ENCOUNTER (OUTPATIENT)
Dept: RADIOLOGY | Facility: HOSPITAL | Age: 49
Discharge: HOME | End: 2019-12-18
Attending: INTERNAL MEDICINE
Payer: COMMERCIAL

## 2019-12-18 DIAGNOSIS — M54.31 SCIATICA OF RIGHT SIDE: ICD-10-CM

## 2019-12-18 PROCEDURE — 72148 MRI LUMBAR SPINE WITHOUT CONTRAST: ICD-10-PCS | Mod: 26,,, | Performed by: RADIOLOGY

## 2019-12-18 PROCEDURE — 72148 MRI LUMBAR SPINE W/O DYE: CPT | Mod: 26,,, | Performed by: RADIOLOGY

## 2019-12-18 PROCEDURE — 72148 MRI LUMBAR SPINE W/O DYE: CPT | Mod: TC

## 2019-12-19 ENCOUNTER — PATIENT MESSAGE (OUTPATIENT)
Dept: INTERNAL MEDICINE | Facility: CLINIC | Age: 49
End: 2019-12-19

## 2019-12-23 ENCOUNTER — TELEPHONE (OUTPATIENT)
Dept: INTERNAL MEDICINE | Facility: CLINIC | Age: 49
End: 2019-12-23

## 2019-12-23 NOTE — TELEPHONE ENCOUNTER
Please call, she has not read her my [facility] message    MRI shows arthritis changes throughout the entire spine, worse in the lower lumbar spine between L4 and L5.  There is also some slight swelling in that region.  On the MRI it looks slightly worse on the left but sometimes we symptoms are on the opposite side.    Can we try to get her a sooner appointment in the spine clinic if she wants?  Appt 1/10/20 thanks

## 2020-01-04 DIAGNOSIS — F32.A DEPRESSION: ICD-10-CM

## 2020-01-06 ENCOUNTER — PATIENT OUTREACH (OUTPATIENT)
Dept: ADMINISTRATIVE | Facility: OTHER | Age: 50
End: 2020-01-06

## 2020-01-06 RX ORDER — DULOXETIN HYDROCHLORIDE 60 MG/1
CAPSULE, DELAYED RELEASE ORAL
Qty: 30 CAPSULE | Refills: 3 | Status: SHIPPED | OUTPATIENT
Start: 2020-01-06 | End: 2020-09-28

## 2020-01-07 DIAGNOSIS — K76.0 NAFLD (NONALCOHOLIC FATTY LIVER DISEASE): Primary | ICD-10-CM

## 2020-01-08 ENCOUNTER — TELEPHONE (OUTPATIENT)
Dept: HEPATOLOGY | Facility: CLINIC | Age: 50
End: 2020-01-08

## 2020-01-08 NOTE — TELEPHONE ENCOUNTER
----- Message from Roel Richards sent at 1/8/2020 12:25 PM CST -----  Contact: pt   Calling to cancel/reschedule appt for 01/08 to a later date    Call back: [phone]

## 2020-01-10 ENCOUNTER — OFFICE VISIT (OUTPATIENT)
Dept: SPINE | Facility: CLINIC | Age: 50
End: 2020-01-10
Payer: COMMERCIAL

## 2020-01-10 VITALS
HEART RATE: 72 BPM | HEIGHT: 70 IN | SYSTOLIC BLOOD PRESSURE: 123 MMHG | BODY MASS INDEX: 37.24 KG/M2 | DIASTOLIC BLOOD PRESSURE: 77 MMHG | TEMPERATURE: 97 F | WEIGHT: 260.13 LBS

## 2020-01-10 DIAGNOSIS — M43.16 SPONDYLOLISTHESIS, LUMBAR REGION: ICD-10-CM

## 2020-01-10 DIAGNOSIS — M47.816 SPONDYLOSIS OF LUMBAR REGION WITHOUT MYELOPATHY OR RADICULOPATHY: ICD-10-CM

## 2020-01-10 DIAGNOSIS — M54.50 CHRONIC MIDLINE LOW BACK PAIN WITHOUT SCIATICA: Primary | ICD-10-CM

## 2020-01-10 DIAGNOSIS — M51.26 HNP (HERNIATED NUCLEUS PULPOSUS), LUMBAR: ICD-10-CM

## 2020-01-10 DIAGNOSIS — M71.38 SYNOVIAL CYST OF LUMBAR FACET JOINT: ICD-10-CM

## 2020-01-10 DIAGNOSIS — M51.36 DDD (DEGENERATIVE DISC DISEASE), LUMBAR: ICD-10-CM

## 2020-01-10 DIAGNOSIS — G89.29 CHRONIC MIDLINE LOW BACK PAIN WITHOUT SCIATICA: Primary | ICD-10-CM

## 2020-01-10 PROCEDURE — 99204 PR OFFICE/OUTPT VISIT, NEW, LEVL IV, 45-59 MIN: ICD-10-PCS | Mod: S$GLB,,, | Performed by: PHYSICIAN ASSISTANT

## 2020-01-10 PROCEDURE — 3008F BODY MASS INDEX DOCD: CPT | Mod: CPTII,S$GLB,, | Performed by: PHYSICIAN ASSISTANT

## 2020-01-10 PROCEDURE — 99204 OFFICE O/P NEW MOD 45 MIN: CPT | Mod: S$GLB,,, | Performed by: PHYSICIAN ASSISTANT

## 2020-01-10 PROCEDURE — 3008F PR BODY MASS INDEX (BMI) DOCUMENTED: ICD-10-PCS | Mod: CPTII,S$GLB,, | Performed by: PHYSICIAN ASSISTANT

## 2020-01-10 PROCEDURE — 99999 PR PBB SHADOW E&M-EST. PATIENT-LVL IV: CPT | Mod: PBBFAC,,, | Performed by: PHYSICIAN ASSISTANT

## 2020-01-10 PROCEDURE — 99999 PR PBB SHADOW E&M-EST. PATIENT-LVL IV: ICD-10-PCS | Mod: PBBFAC,,, | Performed by: PHYSICIAN ASSISTANT

## 2020-01-10 NOTE — LETTER
January 10, 2020      Layla Laws MD  [address]  [facility] [address]           [facility] [address]  [address], [address]  [facility] [address]  Phone: [phone]  Fax: [phone]          Patient: Caryn Zarate   MR Number: [id number]   YOB: 1970   Date of Visit: 1/10/2020       Dear Dr. Layla Laws:    Thank you for referring Caryn Zarate to me for evaluation. Attached you will find relevant portions of my assessment and plan of care.    If you have questions, please do not hesitate to call me. I look forward to following Caryn Zarate along with you.    Sincerely,    Connie Keller PA-C    Enclosure  CC:  No Recipients    If you would like to receive this communication electronically, please contact [email or web address] or [phone] to request more information on [employer] Link access.    For providers and/or their staff who would like to refer a patient to [facility], please contact us through our one-stop-shop provider referral line, [facility], at [phone].    If you feel you have received this communication in error or would no longer like to receive these types of communications, please e-mail [email or web address]

## 2020-01-10 NOTE — PROGRESS NOTES
Subjective:     Patient ID:  Caryn Zarate is a 49 y.o. female.    Jailyn    Chief Complaint: Back pain and right leg numbness    HPI    Caryn Zarate is a 49 y.o. female who presents with the above CC.  Symptoms started three months ago.  Low back pain is in the middle low back rated 9/10 that is worse with sitting too long and better with standing. She gets some numbness in the right posterior leg that comes and goes.  No leg pain.    The back pain bothers her more than the right leg numbness.    Patient has not had PT or ESIs.  No spine surgery.  Patient is currently taking ibuprofen and robaxin which helps.    Patient denies any recent accidents or trauma, no saddle anesthesias, and no bowel or bladder incontinence.      Review of Systems:    Review of Systems   Constitutional: Negative for chills, diaphoresis, fever, malaise/fatigue and weight loss.   HENT: Negative for congestion, ear discharge, ear pain, hearing loss, nosebleeds, sinus pain, sore throat and tinnitus.    Eyes: Negative for blurred vision, double vision, photophobia, pain, discharge and redness.   Respiratory: Negative for cough, hemoptysis, sputum production, shortness of breath, wheezing and stridor.    Cardiovascular: Negative for chest pain, palpitations, orthopnea, leg swelling and PND.   Gastrointestinal: Negative for abdominal pain, blood in stool, constipation, diarrhea, heartburn, melena, nausea and vomiting.   Genitourinary: Negative for dysuria, flank pain, frequency, hematuria and urgency.   Musculoskeletal: Positive for back pain and neck pain. Negative for falls, joint pain and myalgias.   Skin: Negative for itching and rash.   Neurological: Negative for dizziness, tingling, tremors, sensory change, speech change, seizures, loss of consciousness, weakness and headaches.   Endo/Heme/Allergies: Negative for environmental allergies and polydipsia. Does not bruise/bleed easily.   Psychiatric/Behavioral: Negative for depression,  hallucinations, memory loss and substance abuse. The patient is not nervous/anxious and does not have insomnia.          Past Medical History:   Diagnosis Date    Depression     Fatty liver: see u/s 3/18 3/21/2018    IFG (impaired fasting glucose) 3/18/2016    Obesity     Renal cyst, left: see u/s 3/18 3/21/2018    Sleep apnea 2014    Vitamin D deficiency disease 3/18/2016     Past Surgical History:   Procedure Laterality Date     SECTION      COLONOSCOPY N/A 2018    Procedure: COLONOSCOPY;  Surgeon: Milton Park MD;  Location: [address];  Service: Endoscopy;  Laterality: N/A;    HYSTERECTOMY      ovaries remain    LAPAROSCOPIC HYSTERECTOMY  2009    [facility], for uterine fibroids    TUBAL LIGATION       Current Outpatient Medications on File Prior to Visit   Medication Sig Dispense Refill    cholecalciferol, vitamin D3, (VITAMIN D3) 25 mcg (1,000 unit) capsule Take 2 capsules (2,000 Units total) by mouth once daily. 60 capsule 12    cholecalciferol, vitamin D3, 1,000 unit capsule Take 2 capsules (2,000 Units total) by mouth once daily. 60 capsule 12    DULoxetine (CYMBALTA) 60 MG capsule TAKE 1 CAPSULE(60 MG) BY MOUTH EVERY DAY 30 capsule 3    ergocalciferol (ERGOCALCIFEROL) 50,000 unit Cap Take 1 capsule (50,000 Units total) by mouth every 7 days. 12 capsule 3    ibuprofen (ADVIL,MOTRIN) 600 MG tablet Take 1 tablet (600 mg total) by mouth every 6 (six) hours as needed for Pain (with food or milk). No more than 4 per day 30 tablet 0    multivitamin (THERAGRAN) per tablet Take 1 tablet by mouth once daily.       No current facility-administered medications on file prior to visit.      Review of patient's allergies indicates:   Allergen Reactions    Latex, natural rubber      Social History     Socioeconomic History    Marital status:      Spouse name: Not on file    Number of children: 2    Years of education: Not on file    Highest education level:  "Not on file   Occupational History     Employer: [employer]   Social Needs    Financial resource strain: Not on file    Food insecurity:     Worry: Not on file     Inability: Not on file    Transportation needs:     Medical: Not on file     Non-medical: Not on file   Tobacco Use    Smoking status: Never Smoker    Smokeless tobacco: Never Used   Substance and Sexual Activity    Alcohol use: Yes     Comment: rarely    Drug use: No    Sexual activity: Yes     Partners: Male     Birth control/protection: Surgical   Lifestyle    Physical activity:     Days per week: Not on file     Minutes per session: Not on file    Stress: Only a little   Relationships    Social connections:     Talks on phone: Not on file     Gets together: Not on file     Attends Gnosticism service: Not on file     Active member of club or organization: Not on file     Attends meetings of clubs or organizations: Not on file     Relationship status: Not on file   Other Topics Concern    Are you pregnant or think you may be? Not Asked    Breast-feeding Not Asked   Social History Narrative    Not on file     Family History   Problem Relation Age of Onset    Hypertension Mother     Diabetes Father     Kidney disease Father         Dialysis    Hypertension Father     Cancer Maternal Grandmother         breast    Breast cancer Maternal Grandmother     No Known Problems Daughter     No Known Problems Daughter     Breast cancer Unknown     Colon cancer Neg Hx     Ovarian cancer Neg Hx     Melanoma Neg Hx     Psoriasis Neg Hx     Lupus Neg Hx     Cirrhosis Neg Hx        Objective:      Vitals:    01/10/20 0749   BP: 123/77   Pulse: 72   Temp: 97.4 °F (36.3 °C)   TempSrc: Oral   Weight: 118 kg (260 lb 2.3 oz)   Height: 5' 10" (1.778 m)   PainSc:   9   PainLoc: Back         Physical Exam:    General:  Caryn Zarate is well-developed, well-nourished, appears stated age, in no acute distress, alert and oriented to person, " place, and time.    Pulmonary/Chest:  Respiratory effort normal  Abdominal: Exhibits no distension  Psychiatric:  Normal mood and affect.  Behavior is normal.  Judgement and thought content normal    Musculoskeletal:    Patient arises from a sitting to standing position without difficulty.  Patient walks to the door without evidence of limp, pain, or abnormality of gait. Patient is able to walk on heels and toes without difficulty.    Lumbar ROM:   No pain in lumbar flexion, extension, right lateral bending, and left lateral bending.    Lumbar Spine Inspection:  Normal with no surgical scars and no visible rashes.    Lumbar Spine Palpation:  No tenderness to low back palpation.    SI Joint Palpation:  No tenderness to SI Joint palpation.    Straight Leg Raise:  Negative right and left SLR.    Neurological: Alert and oriented to person, place, and time    Muscle strength against resistance:     Right Left   Hip flexion  5 / 5 5 / 5   Hip extension 5 / 5 5 / 5   Hip abduction 5 / 5 5 / 5   Hip adduction  5 / 5 5 / 5   Knee extension  5 / 5 5 / 5   Knee flexion 5 / 5 5 / 5   Dorsiflexion  5 / 5 5 / 5   EHL  5 / 5 5 / 5   Plantar flexion  5 / 5 5 / 5   Inversion of the feet 5 / 5 5 / 5   Eversion of the feet  5 / 5 5 / 5     Reflexes:     Right Left   Patellar 2+ 2+   Achilles 2+ 2+     Clonus:  Negative bilaterally    On gross examination of the bilateral upper extremities, patient has full painfree ROM with no signs of clubbing, cyanosis, edema, or weakness.     XRAY/MRI Interpretation:     Lumbar spine ap/lateral xrays were personally reviewed today.  No fractures.  No movement on flexion and extension.  Grade one spondylolisthesis L4-5.  L5-S1 DDD and spondylosis.    Lumbar spine MRI was personally reviewed today.  HNP in the right NF with NFS. Right L4-5 facet synovial cyst.  DDD at L4-5 and L5-S1 worse at L5-S1.  Bilateral facet arthropathy at L4-5 and L5-S1.      Assessment:          1. Chronic midline low back  pain without sciatica    2. DDD (degenerative disc disease), lumbar    3. Spondylosis of lumbar region without myelopathy or radiculopathy    4. HNP (herniated nucleus pulposus), lumbar    5. Synovial cyst of lumbar facet joint    6. Spondylolisthesis, lumbar region            Plan:          Orders Placed This Encounter    Ambulatory Referral to Physical/Occupational Therapy       L4-5, L5-S1 DDD/spondylosis.  Right L4-5 foraminal HNP and right L4-5 synovial cyst    -PT through [facility]  -Continue ibuprofen and robaxin PRN  -Could consider injections in the future if needed  -FU in three months    Follow-Up:  Follow up in about 3 months (around 4/10/2020). If there are any questions prior to this, the patient was instructed to contact the office.       KAYLEE Humphreys, PA-C  Neurosurgery  [facility]  [facility]

## 2020-01-31 ENCOUNTER — CLINICAL SUPPORT (OUTPATIENT)
Dept: REHABILITATION | Facility: HOSPITAL | Age: 50
End: 2020-01-31
Payer: COMMERCIAL

## 2020-01-31 DIAGNOSIS — M25.69 DECREASED RANGE OF MOTION OF TRUNK AND BACK: ICD-10-CM

## 2020-01-31 DIAGNOSIS — M54.50 CHRONIC MIDLINE LOW BACK PAIN WITHOUT SCIATICA: ICD-10-CM

## 2020-01-31 DIAGNOSIS — M62.81 WEAKNESS OF TRUNK MUSCULATURE: ICD-10-CM

## 2020-01-31 DIAGNOSIS — G89.29 CHRONIC MIDLINE LOW BACK PAIN WITHOUT SCIATICA: ICD-10-CM

## 2020-01-31 PROCEDURE — 97161 PT EVAL LOW COMPLEX 20 MIN: CPT | Mod: PN

## 2020-01-31 PROCEDURE — 97110 THERAPEUTIC EXERCISES: CPT | Mod: PN

## 2020-01-31 NOTE — PLAN OF CARE
[facility]  Physical Therapy Initial Evaluation    Name: Caryn Zarate  [facility] Number: [id number]    Therapy Diagnosis:   Encounter Diagnoses   Name Primary?    Decreased range of motion of trunk and back     Weakness of trunk musculature     Chronic midline low back pain without sciatica      Physician: Connie Keller, *    Physician Orders: PT Eval and Treat      2-3 times a week/ 6-8 weeks  Lumbar protocol with home exercises.  ROM, stretching lumbar and abdominal musculature. Core muscle strengthening exercises and gait training. Aerobic conditioning, aqua therapy, with modalities including tens, ultrasound and massage PRN.  Light weight (<30 lbs)          Medical Diagnosis from Referral:     Diagnoses:    M54.5,G89.29 (ICD-10-CM) - Chronic midline low back pain without sciatica    M51.36 (ICD-10-CM) - DDD (degenerative disc disease), lumbar    M47.816 (ICD-10-CM) - Spondylosis of lumbar region without myelopathy or radiculopathy    M51.26 (ICD-10-CM) - HNP (herniated nucleus pulposus), lumbar    M71.38 (ICD-10-CM) - Synovial cyst of lumbar facet joint    Evaluation Date: 1/31/2020  Plan of Care Expiration: 4/31/20    Authorization Period Expiration: 12/31/20  Visit # / Visits authorized: 1/ 20    Time In: 0700  Time Out: 0800    Total Billable Time: 60 minutes    Precautions: Standard    Subjective   Date of onset: 4 months    History of current condition:   Caryn  is a 49 year old female presenting with c/o low back pain with intermittent left lower extremity radiculopathy to the foot.  She reports an insidious onset 4 months ago.  She states she has gained 33lbs due to not being able to walk her dog for exercise.  She states she has been an [employer] now for 19 months.       Medical History:   Past Medical History:   Diagnosis Date    Depression     Fatty liver: see u/s 3/18 3/21/2018    IFG (impaired fasting glucose) 3/18/2016    Obesity     Renal cyst, left: see u/s  3/18 3/21/2018    Sleep apnea 2014    Vitamin D deficiency disease 3/18/2016       Surgical History:   Caryn Zarate  has a past surgical history that includes Laparoscopic hysterectomy (2009); Tubal ligation; Hysterectomy;  section; and Colonoscopy (N/A, 2018).    Medications:   Caryn has a current medication list which includes the following prescription(s): cholecalciferol (vitamin d3), cholecalciferol (vitamin d3), duloxetine, ergocalciferol, ibuprofen, and multivitamin.    Allergies:   Review of patient's allergies indicates:   Allergen Reactions    Latex, natural rubber         Imaging:  Recent MRI reveals:      Impression       1. Lumbar degenerative changes contributing to mild spinal canal stenosis at L4-L5 and mild-to-moderate neural foraminal narrowing at L4-L5 and L5-S1.  See above for additional details.  2. Left-sided facet edema at L4-L5, possibly contributing to the patient's pain.       Prior Therapy: pelvic floor last year  Social History:  Unknown, SL, does not exercise in gym  Occupation: [employer], full time  (business)  Prior Level of Function: independent  Current Level of Function: independent    Pain:  Current 5/10, worst 8/10, best 0/10   Location:  Mid lumbar    Aggravating Factors: driving, prolonged sitting, standing (4 minutes), walking  (standing worse than sitting), housework, sleeping at nights,   Easing Factors: flexion, fetal    Pts goals: Her goal is to for her pain to go away so she can walk again.       Objective     Observation:  Pt stands with a sway back posture.  Ambulates with decreased [id number] mobility.   Palpation: mild tenderness on palpation of L4-S1 interdiscal space. Moderate bilateral paraspinal tightness.       Range of Motion/Strength:      Lumbar AROM: Degrees   Flexion 40   Extension 5   Right side bending 10   Left side bending 10   Lumbar quadrant reveals: negative lumbar quadrant    AROM: Bilateral LE: Grossly WFL:     MMT:  Right LE: 4   Left LE: 4 Left hip ER: 4-/5  Abdominal Strength: 3+/5    Mobility: L/S p/a grade 1+  Flexibility: hip flexor length:poor      Hamstring Length:poor    Bed Mobility:Independent  Transfers: Independent    Special Tests:   -LLD:left> right 1 cm  -Slump: Negative  -SLR: negative  -Hip scour: negative  -Dylan's: Negative  -SIJ gapping and compression: Negative      CMS Impairment/Limitation/Restriction for FOTO Lumbar Spine Survey  Status Limitation G-Code CMS Severity Modifier  Intake 50% 50% Current Status CK - At least 40 percent but less than 60 percent  Predicted 64% 36% Goal Status+ CJ - At least 20 percent but less than 40 percent    TREATMENT     Treatment Time In: 0730  Treatment Time Out: 0800    Total Treatment time separate from Evaluation: 60 minutes    Caryn received therapeutic exercises to develop strength, endurance, ROM, flexibility, posture and core stabilization for 10 minutes including:   -LTR x 20  -pelvic tilts x 20  -TrA recruitment 2 x 10  -piriformis stretch 20 sec x 4  -hip flexor stretch 20 sec x 4  -hamstring stretch 20 sec x 4    Caryn received the following manual therapy techniques:  were applied to the: lumbar spine for 5 minutes, including:  -prone gross rotation glides grade 2    Education provided:   - HEP compliance    Written Home Exercises Provided: yes.    Exercises were reviewed and Caryn was able to demonstrate them prior to the end of the session.  Caryn demonstrated good  understanding of the education provided.     See EMR under Patient Instructions for exercises provided 1/31/2020.    Assessment     Pt presents with signs and symptoms consistent with referring diagnosis. Evaluation has determined a decrease in functional status and subjective and objective deficits that can be addressed by physical therapy intervention. Pt demonstrates pain limiting functional activities. Decreased flexibility and strength limiting normal movement patterns. Decreased  segmental motion. Decreased postural strength and awareness. Positive special testing. Decreased participation in functional and recreational activities. Subjective and objective measures are addressed by goals in the plan of care.  Patient/family are involved in the development of these goals. Patient/family are educated about current injury and treatment.       Plan of care was dicussed with patient. Pt will benefit from skilled outpatient Physical Therapy to address the deficits stated above and in the chart below, provide pt/family education, and to maximize pt's level of independence. Pt's spiritual, cultural and educational needs considered and patient is agreeable to the plan of care and goals as stated below:     Pt prognosis is Good.  Anticipated Barriers for therapy: none    Medical Necessity is demonstrated by the following  History  Co-morbidities and personal factors that may impact the plan of care Co-morbidities:   Depression   Fatty liver: see u/s 3/18   IFG (impaired fasting glucose)   Obesity   Renal cyst, left: see u/s 3/18   Sleep apnea   Vitamin D deficiency disease       Personal Factors:   no deficits     low   Examination  Body Structures and Functions, activity limitations and participation restrictions that may impact the plan of care Body Regions:   back  lower extremities    Body Systems:    gross symmetry  ROM  strength    Participation Restrictions:   Walking for exercise, work, household chores    Activity limitations:   Learning and applying knowledge  no deficits    General Tasks and Commands  no deficits    Communication  no deficits    Mobility  lifting and carrying objects  walking    Self care  no deficits    Domestic Life  shopping  cooking  doing house work (cleaning house, washing dishes, laundry)    Interactions/Relationships  no deficits    Life Areas  no deficits    Community and Social Life  community life  recreation and leisure         low   Clinical Presentation stable  and uncomplicated low   Decision Making/ Complexity Score: low     Goals:    Short Term Goals (4 Weeks):     1.Pt to increase strength by a 1/2 grade of muscles test to allow for improvement in functional activities such as performing chores.  2.Pt to improve range of motion by 25% to allow for improved functional mobility to allow for improvement in IADLs.   3.Pt to report compliance with HEP and demonstrate proper exercise technique to PT to show competence with self management of condition.  4.Decrease pain by 25% during functional activities.    Long Term Goals (12 Weeks):     1. Increase ROM to allow improved joint biomechanics during functional activities.   2.Increase trunk and lower extremity strength to within normal limits during functional activities.   3. Independent with home exercise program.   4. Full return to functional activities with manageable complaints.  5. Patient to demonstrate improved posture and body mechanics.  6. Decrease pain by 75% during functional activities.    Plan     Plan of care Certification: 1/31/2020 to 4/31/2020.    Recommended Treatment Plan: 2 times per week for 12 weeks with treatments to consist of:  Neuromuscular and postural re-education,  training, therapeutic exercise, therapeutic activities,balance training, gait training, manual therapy, soft tissue mobilization, ROM exercises, Cardiovascular,  Postural stabilization, manual traction, spinal mobilization, moist heat, cryotherapy, electrical stimulation, ultrasound, home exercise education and planning.    Dwayne Beal, PT

## 2020-02-01 ENCOUNTER — PATIENT OUTREACH (OUTPATIENT)
Dept: ADMINISTRATIVE | Facility: OTHER | Age: 50
End: 2020-02-01

## 2020-02-01 NOTE — PROGRESS NOTES
Chart reviewed. Care Everywhere updates requested. Immunizations reconciled. HM updated.    
11-Oct-2019 19:00

## 2020-02-03 ENCOUNTER — OFFICE VISIT (OUTPATIENT)
Dept: HEPATOLOGY | Facility: CLINIC | Age: 50
End: 2020-02-03
Payer: COMMERCIAL

## 2020-02-03 ENCOUNTER — PROCEDURE VISIT (OUTPATIENT)
Dept: HEPATOLOGY | Facility: CLINIC | Age: 50
End: 2020-02-03
Payer: COMMERCIAL

## 2020-02-03 VITALS
BODY MASS INDEX: 38.06 KG/M2 | SYSTOLIC BLOOD PRESSURE: 134 MMHG | HEIGHT: 70 IN | WEIGHT: 265.88 LBS | HEART RATE: 108 BPM | DIASTOLIC BLOOD PRESSURE: 80 MMHG | OXYGEN SATURATION: 95 %

## 2020-02-03 DIAGNOSIS — R73.01 IFG (IMPAIRED FASTING GLUCOSE): ICD-10-CM

## 2020-02-03 DIAGNOSIS — E78.2 MIXED HYPERLIPIDEMIA: ICD-10-CM

## 2020-02-03 DIAGNOSIS — K76.0 NAFLD (NONALCOHOLIC FATTY LIVER DISEASE): Primary | ICD-10-CM

## 2020-02-03 DIAGNOSIS — K76.0 NAFLD (NONALCOHOLIC FATTY LIVER DISEASE): ICD-10-CM

## 2020-02-03 PROCEDURE — 99999 PR PBB SHADOW E&M-EST. PATIENT-LVL III: ICD-10-PCS | Mod: PBBFAC,,, | Performed by: NURSE PRACTITIONER

## 2020-02-03 PROCEDURE — 3008F PR BODY MASS INDEX (BMI) DOCUMENTED: ICD-10-PCS | Mod: CPTII,S$GLB,, | Performed by: NURSE PRACTITIONER

## 2020-02-03 PROCEDURE — 99499 NO LOS: ICD-10-PCS | Mod: GT,S$GLB,, | Performed by: NURSE PRACTITIONER

## 2020-02-03 PROCEDURE — 3008F BODY MASS INDEX DOCD: CPT | Mod: CPTII,S$GLB,, | Performed by: NURSE PRACTITIONER

## 2020-02-03 PROCEDURE — 99213 PR OFFICE/OUTPT VISIT, EST, LEVL III, 20-29 MIN: ICD-10-PCS | Mod: S$GLB,,, | Performed by: NURSE PRACTITIONER

## 2020-02-03 PROCEDURE — 99499 UNLISTED E&M SERVICE: CPT | Mod: GT,S$GLB,, | Performed by: NURSE PRACTITIONER

## 2020-02-03 PROCEDURE — 99213 OFFICE O/P EST LOW 20 MIN: CPT | Mod: S$GLB,,, | Performed by: NURSE PRACTITIONER

## 2020-02-03 PROCEDURE — 99999 PR PBB SHADOW E&M-EST. PATIENT-LVL III: CPT | Mod: PBBFAC,,, | Performed by: NURSE PRACTITIONER

## 2020-02-03 RX ORDER — ELECTROLYTES/DEXTROSE
SOLUTION, ORAL ORAL
COMMUNITY
End: 2022-07-07

## 2020-02-03 NOTE — LETTER
February 3, 2020      Lyala Laws MD  [address]  [facility] [address]           [facility]  [address]  [facility] [address]  Phone: [phone]  Fax: [phone]          Patient: Caryn Zarate   MR Number: [id number]   YOB: 1970   Date of Visit: 2/3/2020       Dear Dr. Layla Laws:    Thank you for referring Caryn Zarate to me for evaluation. Attached you will find relevant portions of my assessment and plan of care.    If you have questions, please do not hesitate to call me. I look forward to following Caryn Zarate along with you.    Sincerely,    Gardenia Sung, SHEYLA    Enclosure  CC:  No Recipients    If you would like to receive this communication electronically, please contact [email or web address] or [phone] to request more information on [employer] Link access.    For providers and/or their staff who would like to refer a patient to [facility], please contact us through our one-stop-shop provider referral line, [facility], at [phone].    If you feel you have received this communication in error or would no longer like to receive these types of communications, please e-mail [email or web address]

## 2020-02-03 NOTE — PROGRESS NOTES
[facility] - Established Patient    Last Clinic Visit: 12/18/18    CHIEF COMPLAINT: Follow-up for NAFLD     HPI: This is a 49 y.o. Black or  female here for follow-up for NAFLD. Fatty liver first present on abdominal u/s in 3/2018.     She has normal liver enzymes and synthetic liver function.      Fibrosis staging:   Fibroscan 12/2018 = F2 (kPa 8.3). , S3 or >67% steatosis    Updates on risk factors for fatty liver:     · Weight -- Body mass index is 38.15 kg/m². She was able to lose 50 lb with diet and exercise though has since gained it all back. Exercise has been difficult with sciatic nerve issues.                       · Cholesterol -- well controlled                               · Insulin resistance / diabetes -- previously pre-diabetic though last HgbA1c 5.6          · Hypertension -- BP well controlled     She feels well, no complaints.   Denies symptoms of hepatic decompensation including jaundice, ascites, cognitive problems to suggest hepatic encephalopathy, or GI bleeding.     Completed vaccines for hep A and B.       Review of patient's allergies indicates:   Allergen Reactions    Latex, natural rubber         Current Outpatient Medications on File Prior to Visit   Medication Sig Dispense Refill    biotin 5 mg Cap Take by mouth.      DULoxetine (CYMBALTA) 60 MG capsule TAKE 1 CAPSULE(60 MG) BY MOUTH EVERY DAY 30 capsule 3    ergocalciferol (ERGOCALCIFEROL) 50,000 unit Cap Take 1 capsule (50,000 Units total) by mouth every 7 days. 12 capsule 3    ibuprofen (ADVIL,MOTRIN) 600 MG tablet Take 1 tablet (600 mg total) by mouth every 6 (six) hours as needed for Pain (with food or milk). No more than 4 per day 30 tablet 0    multivitamin (THERAGRAN) per tablet Take 1 tablet by mouth once daily.      [DISCONTINUED] cholecalciferol, vitamin D3, (VITAMIN D3) 25 mcg (1,000 unit) capsule Take 2 capsules (2,000 Units total) by mouth once daily. (Patient not taking:  Reported on 2/3/2020) 60 capsule 12    [DISCONTINUED] cholecalciferol, vitamin D3, 1,000 unit capsule Take 2 capsules (2,000 Units total) by mouth once daily. (Patient not taking: Reported on 2/3/2020) 60 capsule 12     No current facility-administered medications on file prior to visit.          PMHX:  has a past medical history of Depression, Fatty liver: see u/s 3/18 (3/21/2018), IFG (impaired fasting glucose) (3/18/2016), Obesity, Renal cyst, left: see u/s 3/18 (3/21/2018), Sleep apnea (2014), and Vitamin D deficiency disease (3/18/2016).    PSHX:  has a past surgical history that includes Laparoscopic hysterectomy (2009); Tubal ligation; Hysterectomy;  section; and Colonoscopy (N/A, 2018).    FAMILY HISTORY:   Family History   Problem Relation Age of Onset    Hypertension Mother     Diabetes Father     Kidney disease Father         Dialysis    Hypertension Father     Cancer Maternal Grandmother         breast    Breast cancer Maternal Grandmother     No Known Problems Daughter     No Known Problems Daughter     Breast cancer Unknown     Colon cancer Neg Hx     Ovarian cancer Neg Hx     Melanoma Neg Hx     Psoriasis Neg Hx     Lupus Neg Hx     Cirrhosis Neg Hx        SOCIAL HISTORY:   Social History     Tobacco Use   Smoking Status Never Smoker   Smokeless Tobacco Never Used       Social History     Substance and Sexual Activity   Alcohol Use Yes    Comment: rarely       Social History     Substance and Sexual Activity   Drug Use No       Review of Systems   Constitutional: Negative for appetite change, chills, fatigue, fever. (+) weight gain   Eyes: Negative for visual disturbance.   Respiratory: Negative for cough and shortness of breath.    Cardiovascular: Negative for chest pain, palpitations and leg swelling.   Gastrointestinal: Negative for abdominal distention, abdominal pain, blood in stool, constipation, diarrhea, nausea and vomiting. No change in stool  "color.  Genitourinary: Negative for dysuria and hematuria. Denies dark urine.   Musculoskeletal: Negative for arthralgias, gait problem, joint swelling and myalgias.   Skin: Negative for color change, rash and wound. Denies itching.   Neurological: Negative for dizziness, tremors, speech difficulty, and headaches.   Hematological: Does not bruise/bleed easily.   Psychiatric/Behavioral: Negative for confusion, decreased concentration and sleep disturbance. Denies memory loss. Denies depression. The patient is not nervous/anxious.        Physical Exam   Constitutional: Well-nourished. No distress. Alert and oriented to person, place, and time.  Eyes: No scleral icterus.   Cardiovascular: Normal rate, regular rhythm and normal heart sounds. No murmur heard.  Pulmonary/Chest: Respiratory effort and breath sounds normal. No respiratory distress.   Abdominal: Soft, non-tender. No distension; no ascites appreciated. There is no palpable hepatosplenomegaly. No hernia or mass.   Musculoskeletal: No edema.   Neurological: No tremor. Coordination and gait normal. No asterixis.    Skin: Skin is warm and dry. No rash or erythema. No jaundice. No telangiectasias or palmar erythema noted.  Psychiatric: Normal mood and affect. Speech, behavior, and thought content normal. No depression or anxiety noted.       /80 (BP Location: Right arm, Patient Position: Sitting, BP Method: Medium (Automatic))   Pulse 108   Ht 5' 10" (1.778 m)   Wt 120.6 kg (265 lb 14 oz)   SpO2 95%   BMI 38.15 kg/m²         LABS:  Lab Results   Component Value Date    WBC 5.45 12/11/2019    HGB 13.8 12/11/2019    HCT 40.6 12/11/2019     12/11/2019     12/11/2019    K 4.2 12/11/2019    CREATININE 0.9 12/11/2019    ALT 23 12/11/2019    AST 22 12/11/2019    ALKPHOS 97 12/11/2019    BILITOT 0.6 12/11/2019    ALBUMIN 3.9 12/11/2019    INR 0.9 10/11/2014    FERRITIN 379 (H) 10/30/2018    FESATURATED 33 10/30/2018    CHOL 219 (H) 12/11/2019    " TRIG 80 12/11/2019    LDLCALC 129.0 12/11/2019    HGBA1C 5.6 03/08/2018       Hepatitis A Antibody IgG   Date Value Ref Range Status   10/30/2018 Negative  Final       Hepatitis B Surface Ag   Date Value Ref Range Status   03/17/2016 Negative  Final     Hep B Core Total Ab   Date Value Ref Range Status   10/30/2018 Negative  Final     Hep B S Ab   Date Value Ref Range Status   10/30/2018 Negative  Final     Hepatitis C Ab   Date Value Ref Range Status   03/17/2016 Negative  Final     Immunization History   Administered Date(s) Administered    Hepatitis A / Hepatitis B 12/18/2018, 01/18/2019, 06/18/2019    Influenza - Quadrivalent - PF (6 months and older) 10/30/2018    Tdap 10/30/2018          DIAGNOSTIC STUDIES:  ABD. U/S  Results for orders placed during the hospital encounter of 03/21/18   US Abdomen Complete    Narrative EXAMINATION:  US ABDOMEN COMPLETE    CLINICAL HISTORY:  Left upper quadrant pain    TECHNIQUE:  Complete abdominal ultrasound (including pancreas, liver, gallbladder, common bile duct, spleen, aorta, IVC, and kidneys) was performed.    COMPARISON:  None    FINDINGS:  Liver: Enlarged measuring 18.6 cm. The liver is diffusely increased in echogenicity consistent with fatty changes of the liver.  No focal hepatic lesions.    Gallbladder: No calculi, wall thickening, or pericholecystic fluid.  No sonographic Mckee's sign.    Biliary system: The common duct is not dilated, measuring 4 mm.  No intrahepatic ductal dilatation.    Spleen: Normal in size with a homogeneous echotexture, measuring 8.3 cm.    Pancreas: The visualized portions of pancreas appear normal.    Right kidney: Normal in size with no hydronephrosis, measuring 10.1 x 4.4 x 6.0 cm.  No renal calculi are identified.    Left kidney:  Normal in size with no hydronephrosis, measuring 11.9 x 5.5 x 5.5 cm.  There is a 1.0 cm left upper pole renal cyst.  No renal calculi are identified.    Aorta: No aneurysm.    Inferior vena cava: Normal  in appearance.    Miscellaneous: No ascites.      Impression Moderate hepatomegaly with diffusely increased hepatic echogenicity consistent with Fatty changes of the liver.    1.0 cm left upper pole renal cyst.      Electronically signed by: Zen Page MD  Date:    03/21/2018  Time:    08:42         ASSESSMENT / PLAN:  49 y.o. Black or  female with:    1. NAFLD  -- Liver tests remain normal  -- Unable to repeat Fibroscan today as she just ate, will reschedule when she returns in March  -- Discussed importance of lifestyle modifications including healthy diet and adequate physical activity to achieve and maintain ideal body weight.   -- Low carbohydrate, low sugar diet.  -- Maintain control of blood pressure, cholesterol, and blood sugar as these are risk factors for fatty liver    *If statins are being considered for HLD, statins are safe in patients with liver disease. Statins can be used to treat dyslipidemia in patients with both NAFLD and STORY.    2. Body mass index is 38.15 kg/m²., impaired fasting glucose, HLD        Return to clinic pending Fibroscan results.  If F0-F1, can likely continue f/u with PCP with liver lab montoring 1-2 times per year.         EDUCATION / DISCUSSION:   We discussed the manifestations of fatty liver. At this time there is no FDA approved therapy for fatty liver. The patient and I discussed the importance of lifestyle modifications such as diet, exercise, and weight loss for management of fatty liver. We reviewed modification of risk factors such as hypertension, hyperlipidemia, and diabetes mellitus / impaired fasting glucose. Discussed that excessive alcohol consumption can also cause fatty liver. We discussed a low carb, low sugar diet and a goal of 30 minutes of exercise 5 times per week. Patient is aware that fatty liver can progress to steatohepatitis and possibly to cirrhosis, putting one at increased risk for liver cancer or liver failure.           Thank  you for allowing me to participate in the care of Caryn Sung, FNP-C  Hepatology and Liver Transplant

## 2020-02-13 ENCOUNTER — PATIENT MESSAGE (OUTPATIENT)
Dept: REHABILITATION | Facility: HOSPITAL | Age: 50
End: 2020-02-13

## 2020-03-03 ENCOUNTER — CLINICAL SUPPORT (OUTPATIENT)
Dept: REHABILITATION | Facility: HOSPITAL | Age: 50
End: 2020-03-03
Payer: COMMERCIAL

## 2020-03-03 DIAGNOSIS — G89.29 CHRONIC MIDLINE LOW BACK PAIN WITHOUT SCIATICA: ICD-10-CM

## 2020-03-03 DIAGNOSIS — M25.69 DECREASED RANGE OF MOTION OF TRUNK AND BACK: ICD-10-CM

## 2020-03-03 DIAGNOSIS — M62.81 WEAKNESS OF TRUNK MUSCULATURE: ICD-10-CM

## 2020-03-03 DIAGNOSIS — M54.50 CHRONIC MIDLINE LOW BACK PAIN WITHOUT SCIATICA: ICD-10-CM

## 2020-03-03 PROCEDURE — 97110 THERAPEUTIC EXERCISES: CPT | Mod: PN

## 2020-03-03 PROCEDURE — 97140 MANUAL THERAPY 1/> REGIONS: CPT | Mod: PN

## 2020-03-03 NOTE — PROGRESS NOTES
Physical Therapy Daily Treatment Note     Name: Caryn Zarate  [facility] Number: [id number]    Therapy Diagnosis:   Encounter Diagnoses   Name Primary?    Decreased range of motion of trunk and back     Weakness of trunk musculature     Chronic midline low back pain without sciatica      Physician: Connie Keller, *    Visit Date: 3/3/2020    Physician Orders: PT Eval and Treat        2-3 times a week/ 6-8 weeks  Lumbar protocol with home exercises.  ROM, stretching lumbar and abdominal musculature. Core muscle strengthening exercises and gait training. Aerobic conditioning, aqua therapy, with modalities including tens, ultrasound and massage PRN.  Light weight (<30 lbs)            Medical Diagnosis from Referral:      Diagnoses:    M54.5,G89.29 (ICD-10-CM) - Chronic midline low back pain without sciatica    M51.36 (ICD-10-CM) - DDD (degenerative disc disease), lumbar    M47.816 (ICD-10-CM) - Spondylosis of lumbar region without myelopathy or radiculopathy    M51.26 (ICD-10-CM) - HNP (herniated nucleus pulposus), lumbar    M71.38 (ICD-10-CM) - Synovial cyst of lumbar facet joint     Evaluation Date: 1/31/2020  Plan of Care Expiration: 4/31/20     Authorization Period Expiration: 12/31/20  Visit # / Visits authorized: 2/ 20    Time In: 0715  Time Out: 0800    Total Billable Time: 45 minutes    Precautions: standard    Subjective     Pt reports:  She is much improved.  Reports decreased radicular symptoms.  She was compliant with home exercise program.  Response to previous treatment: good  Functional change:     Pain: 0.5/10  Location: lumbar      Objective     Caryn received therapeutic exercises to develop strength, endurance, ROM, flexibility, posture and core stabilization for 35 minutes including:     -LTR x 20  -pelvic tilts x 20  -TrA recruitment 2 x 10  -TrA recruitment 2 x 10 with unilateral hip flex  -TrA recruitment 2 x 10 with unilateral hip ext  -piriformis stretch 20 sec x 4  -hip flexor stretch 20  sec x 4  -hamstring stretch 20 sec x 4     Caryn received the following manual therapy techniques:  were applied to the: lumbar spine for 10 minutes, including:  -prone gross rotation glides grade 2     Education provided:   - HEP compliance    Written Home Exercises Provided: Patient instructed to cont prior HEP.  Exercises were reviewed and Caryn was able to demonstrate them prior to the end of the session.  Caryn demonstrated good  understanding of the education provided.     See EMR under Patient Instructions for exercises provided 3/3/2020.    Assessment     No c/o increased discomfort with prescribed activities.  Requires min cueing with pelvic mobility and abdominal response.  Good response to exercise progression. Caryn is progressing well towards her goals.   Pt prognosis is Good.     Pt will continue to benefit from skilled outpatient physical therapy to address the deficits listed in the problem list box on initial evaluation, provide pt/family education and to maximize pt's level of independence in the home and community environment.     Pt's spiritual, cultural and educational needs considered and pt agreeable to plan of care and goals.     Anticipated barriers to physical therapy: none    Short Term Goals (4 Weeks):     1.Pt to increase strength by a 1/2 grade of muscles test to allow for improvement in functional activities such as performing chores.  2.Pt to improve range of motion by 25% to allow for improved functional mobility to allow for improvement in IADLs.   3.Pt to report compliance with HEP and demonstrate proper exercise technique to PT to show competence with self management of condition.  4.Decrease pain by 25% during functional activities.    Long Term Goals (12 Weeks):     1. Increase ROM to allow improved joint biomechanics during functional activities.   2.Increase trunk and lower extremity strength to within normal limits during functional activities.   3. Independent with home  exercise program.   4. Full return to functional activities with manageable complaints.  5. Patient to demonstrate improved posture and body mechanics.  6. Decrease pain by 75% during functional activities.         Plan       Recommended Treatment Plan: 2-3 times per week for 12 weeks with treatments to consist of:  Neuromuscular and postural re-education,  training, therapeutic exercise, therapeutic activities,balance training, gait training, manual therapy, soft tissue mobilization, ROM exercises, Cardiovascular,  Postural stabilization, manual traction, spinal mobilization, moist heat, cryotherapy, electrical stimulation, ultrasound, home exercise education and planning.    Continue with established Plan of Care towards PT goals.     Dwayne Beal, PT

## 2020-03-12 ENCOUNTER — LAB VISIT (OUTPATIENT)
Dept: LAB | Facility: HOSPITAL | Age: 50
End: 2020-03-12
Attending: INTERNAL MEDICINE
Payer: COMMERCIAL

## 2020-03-12 ENCOUNTER — PROCEDURE VISIT (OUTPATIENT)
Dept: HEPATOLOGY | Facility: CLINIC | Age: 50
End: 2020-03-12
Payer: COMMERCIAL

## 2020-03-12 ENCOUNTER — PATIENT MESSAGE (OUTPATIENT)
Dept: INTERNAL MEDICINE | Facility: CLINIC | Age: 50
End: 2020-03-12

## 2020-03-12 DIAGNOSIS — E55.9 VITAMIN D DEFICIENCY DISEASE: ICD-10-CM

## 2020-03-12 DIAGNOSIS — E78.2 MIXED HYPERLIPIDEMIA: ICD-10-CM

## 2020-03-12 DIAGNOSIS — K76.0 NAFLD (NONALCOHOLIC FATTY LIVER DISEASE): Primary | ICD-10-CM

## 2020-03-12 LAB
25(OH)D3+25(OH)D2 SERPL-MCNC: 21 NG/ML (ref 30–96)
CHOLEST SERPL-MCNC: 192 MG/DL (ref 120–199)
CHOLEST/HDLC SERPL: 3 {RATIO} (ref 2–5)
HDLC SERPL-MCNC: 65 MG/DL (ref 40–75)
HDLC SERPL: 33.9 % (ref 20–50)
LDLC SERPL CALC-MCNC: 114 MG/DL (ref 63–159)
NONHDLC SERPL-MCNC: 127 MG/DL
TRIGL SERPL-MCNC: 65 MG/DL (ref 30–150)

## 2020-03-12 PROCEDURE — 91200 LIVER ELASTOGRAPHY: CPT | Mod: S$GLB,,, | Performed by: NURSE PRACTITIONER

## 2020-03-12 PROCEDURE — 36415 COLL VENOUS BLD VENIPUNCTURE: CPT

## 2020-03-12 PROCEDURE — 82306 VITAMIN D 25 HYDROXY: CPT

## 2020-03-12 PROCEDURE — 80061 LIPID PANEL: CPT

## 2020-03-12 PROCEDURE — 91200 FIBROSCAN (VIBRATION CONTROLLED TRANSIENT ELASTOGRAPHY): ICD-10-PCS | Mod: S$GLB,,, | Performed by: NURSE PRACTITIONER

## 2020-03-13 ENCOUNTER — PATIENT MESSAGE (OUTPATIENT)
Dept: HEPATOLOGY | Facility: CLINIC | Age: 50
End: 2020-03-13

## 2020-03-13 ENCOUNTER — TELEPHONE (OUTPATIENT)
Dept: HEPATOLOGY | Facility: CLINIC | Age: 50
End: 2020-03-13

## 2020-03-13 NOTE — PROCEDURES
FibroScan (Vibration Controlled Transient Elastography)  Date/Time: 3/12/2020 8:00 AM  Performed by: Gardenia Sung NP  Authorized by: Tate Blanco PA-C     Diagnosis:  NAFLD    Probe:  M    Universal Protocol: Patient's identity, procedure and site were verified, confirmatory pause was performed.  Discussed procedure including risks and potential complications.  Questions answered.  Patient verbalizes understanding and wishes to proceed with VCTE.     Procedure: After providing explanations of the procedure, patient was placed in the supine position with right arm in maximum abduction to allow optimal exposure of right lateral abdomen.  Patient was briefly assessed, Testing was performed in the mid-axillary location, 50Hz Shear Wave pulses were applied and the resulting Shear Wave and Propagation Speed detected with a 3.5 MHz ultrasonic signal, using the FibroScan probe, Skin to liver capsule distance and liver parenchyma were accessed during the entire examination with the FibroScan probe, Patient was instructed to breathe normally and to abstain from sudden movements during the procedure, allowing for random measurements of liver stiffness. At least 10 Shear Waves were produced, Individual measurements of each Shear Wave were calculated.  Patient tolerated the procedure well with no complications.  Meets discharge criteria as was dismissed.  Rates pain 0 out of 10.  Patient will follow up with ordering provider to review results.      Findings  Median liver stiffness score:  7.6  CAP Reading: dB/m:  293    IQR/med %:  8  Interpretation  Fibrosis interpretation is based on medial liver stiffness - Kilopascal (kPa).    Fibrosis Stage:  F2  Steatosis interpretation is based on controlled attenuation parameter - (dB/m).    Steatosis Grade:  S3

## 2020-04-13 ENCOUNTER — PATIENT OUTREACH (OUTPATIENT)
Dept: ADMINISTRATIVE | Facility: OTHER | Age: 50
End: 2020-04-13

## 2020-04-13 NOTE — PROGRESS NOTES
Chart reviewed.   Immunizations: Triggered Imm Registry     Orders placed: n/a  Upcoming appts to satisfy TERRI topics: n/a

## 2020-04-14 ENCOUNTER — TELEPHONE (OUTPATIENT)
Dept: SPINE | Facility: CLINIC | Age: 50
End: 2020-04-14

## 2020-04-24 ENCOUNTER — PATIENT MESSAGE (OUTPATIENT)
Dept: SPINE | Facility: CLINIC | Age: 50
End: 2020-04-24

## 2020-08-03 ENCOUNTER — PATIENT MESSAGE (OUTPATIENT)
Dept: INTERNAL MEDICINE | Facility: CLINIC | Age: 50
End: 2020-08-03

## 2020-08-03 DIAGNOSIS — R10.9 ABDOMINAL PAIN, UNSPECIFIED ABDOMINAL LOCATION: Primary | ICD-10-CM

## 2020-08-05 ENCOUNTER — PATIENT MESSAGE (OUTPATIENT)
Dept: INTERNAL MEDICINE | Facility: CLINIC | Age: 50
End: 2020-08-05

## 2020-08-05 ENCOUNTER — HOSPITAL ENCOUNTER (OUTPATIENT)
Dept: RADIOLOGY | Facility: HOSPITAL | Age: 50
Discharge: HOME | End: 2020-08-05
Attending: INTERNAL MEDICINE
Payer: COMMERCIAL

## 2020-08-05 DIAGNOSIS — R10.9 ABDOMINAL PAIN, UNSPECIFIED ABDOMINAL LOCATION: ICD-10-CM

## 2020-08-05 DIAGNOSIS — R10.9 ABDOMINAL PAIN, UNSPECIFIED ABDOMINAL LOCATION: Primary | ICD-10-CM

## 2020-08-05 PROCEDURE — 76700 US ABDOMEN COMPLETE: ICD-10-PCS | Mod: 26,,, | Performed by: RADIOLOGY

## 2020-08-05 PROCEDURE — 76700 US EXAM ABDOM COMPLETE: CPT | Mod: TC

## 2020-08-05 PROCEDURE — 76700 US EXAM ABDOM COMPLETE: CPT | Mod: 26,,, | Performed by: RADIOLOGY

## 2020-08-12 ENCOUNTER — PATIENT OUTREACH (OUTPATIENT)
Dept: ADMINISTRATIVE | Facility: OTHER | Age: 50
End: 2020-08-12

## 2020-08-12 NOTE — PROGRESS NOTES
Health Maintenance Due   Topic Date Due    Pneumococcal Vaccine (Medium Risk) (1 of 1 - PPSV23) 06/24/1987    Shingles Vaccine (1 of 2) 06/24/2018     Updates were requested from care everywhere.  Chart was reviewed for overdue Proactive [facility] ([facility]) topics (CRS, Breast Cancer Screening, Eye exam)  Health Maintenance has been updated.  LINKS immunization registry triggered.  Immunizations were reconciled.

## 2020-08-13 ENCOUNTER — OFFICE VISIT (OUTPATIENT)
Dept: GASTROENTEROLOGY | Facility: CLINIC | Age: 50
End: 2020-08-13
Payer: COMMERCIAL

## 2020-08-13 DIAGNOSIS — R10.9 ABDOMINAL PAIN, UNSPECIFIED ABDOMINAL LOCATION: ICD-10-CM

## 2020-08-13 PROCEDURE — 99203 OFFICE O/P NEW LOW 30 MIN: CPT | Mod: 95,,, | Performed by: PHYSICIAN ASSISTANT

## 2020-08-13 PROCEDURE — 99203 PR OFFICE/OUTPT VISIT, NEW, LEVL III, 30-44 MIN: ICD-10-PCS | Mod: 95,,, | Performed by: PHYSICIAN ASSISTANT

## 2020-08-13 NOTE — PROGRESS NOTES
Clinic Consult:  [facility]    Reason for Consult:  The encounter diagnosis was Abdominal pain, unspecified abdominal location.    PCP: Layla Laws   [address] / [place] [address]    CC: No chief complaint on file.    The patient location is: [place]/home  The chief complaint leading to consultation is: Left upper quadrant pain    Visit type: audiovisual    Face to Face time with patient: 7-10  15 minutes of total time spent on the encounter, which includes face to face time and non-face to face time preparing to see the patient (eg, review of tests), Obtaining and/or reviewing separately obtained history, Documenting clinical information in the electronic or other health record, Independently interpreting results (not separately reported) and communicating results to the patient/family/caregiver, or Care coordination (not separately reported).     Each patient to whom he or she provides medical services by telemedicine is:  (1) informed of the relationship between the physician and patient and the respective role of any other health care provider with respect to management of the patient; and (2) notified that he or she may decline to receive medical services by telemedicine and may withdraw from such care at any time.    Notes:   HPI:  This is a 50 y.o. female here for evaluation of the above via telemedicine. She states that almost 2 weeks ago, she was eating pasta and marinara sauce when she experienced a squeezing pain located under her left ribs. It was severe. She eventually belched and the pain improved. The area was sore for almost 3 days following the incident. She has since changed her diet and is eating much healthier. She has not had a repeat occurrence of the pain. She was struggling with some constipation, but this has resolved since beginning her new diet. Denies hematochezia, melena. She reported to PCP who completed an US of the abdomen. All normal with  the exception of fatty liver. Patient was aware of the fatty liver. She states she has an appointment with an additional provider to discuss this finding.    Last colonoscopy 2018 with 3 year repeat due to poor prep. Normal colon.    Review of Systems   Constitutional: Negative for activity change, appetite change, chills, diaphoresis, fatigue, fever and unexpected weight change.   HENT: Negative for trouble swallowing.    Respiratory: Negative for cough, choking and shortness of breath.    Cardiovascular: Negative for chest pain.   Gastrointestinal: Positive for abdominal pain (resolved) and constipation (resolved). Negative for abdominal distention, blood in stool, diarrhea, nausea and vomiting.   Genitourinary: Negative for dysuria.   Musculoskeletal: Negative for back pain.   Neurological: Negative for dizziness, weakness and light-headedness.   Psychiatric/Behavioral: Negative for dysphoric mood. The patient is not nervous/anxious.         Medical History:   Past Medical History:   Diagnosis Date    Depression     Fatty liver: see u/s 3/18 3/21/2018    IFG (impaired fasting glucose) 3/18/2016    Obesity     Renal cyst, left: see u/s 3/18 3/21/2018    Sleep apnea 2014    Vitamin D deficiency disease 3/18/2016       Surgical History:   Past Surgical History:   Procedure Laterality Date     SECTION      COLONOSCOPY N/A 2018    Procedure: COLONOSCOPY;  Surgeon: Milton Park MD;  Location: [facility] ([address] [place] [address]);  Service: Endoscopy;  Laterality: N/A;    HYSTERECTOMY      ovaries remain    LAPAROSCOPIC HYSTERECTOMY  2009    [facility], for uterine fibroids    TUBAL LIGATION         Family History:    Family History   Problem Relation Age of Onset    Hypertension Mother     Diabetes Father     Kidney disease Father         Dialysis    Hypertension Father     Cancer Maternal Grandmother         breast    Breast cancer Maternal Grandmother     No Known Problems Daughter     No  Known Problems Daughter     Breast cancer Unknown     Colon cancer Neg Hx     Ovarian cancer Neg Hx     Melanoma Neg Hx     Psoriasis Neg Hx     Lupus Neg Hx     Cirrhosis Neg Hx        Social History:   Social History     Socioeconomic History    Marital status:      Spouse name: Not on file    Number of children: 2    Years of education: Not on file    Highest education level: Not on file   Occupational History     Employer: [employer]   Social Needs    Financial resource strain: Not on file    Food insecurity     Worry: Not on file     Inability: Not on file    Transportation needs     Medical: Not on file     Non-medical: Not on file   Tobacco Use    Smoking status: Never Smoker    Smokeless tobacco: Never Used   Substance and Sexual Activity    Alcohol use: Yes     Comment: rarely    Drug use: No    Sexual activity: Yes     Partners: Male     Birth control/protection: Surgical   Lifestyle    Physical activity     Days per week: Not on file     Minutes per session: Not on file    Stress: Only a little   Relationships    Social connections     Talks on phone: Not on file     Gets together: Not on file     Attends Anabaptism service: Not on file     Active member of club or organization: Not on file     Attends meetings of clubs or organizations: Not on file     Relationship status: Not on file   Other Topics Concern    Are you pregnant or think you may be? Not Asked    Breast-feeding Not Asked   Social History Narrative    Not on file       Allergies:   Review of patient's allergies indicates:   Allergen Reactions    Latex, natural rubber        Home Medications:   Current Outpatient Medications on File Prior to Visit   Medication Sig Dispense Refill    biotin 5 mg Cap Take by mouth.      DULoxetine (CYMBALTA) 60 MG capsule TAKE 1 CAPSULE(60 MG) BY MOUTH EVERY DAY 30 capsule 3    ergocalciferol (ERGOCALCIFEROL) 50,000 unit Cap Take 1 capsule (50,000 Units total)  by mouth every 7 days. 12 capsule 3    ibuprofen (ADVIL,MOTRIN) 600 MG tablet Take 1 tablet (600 mg total) by mouth every 6 (six) hours as needed for Pain (with food or milk). No more than 4 per day 30 tablet 0    multivitamin (THERAGRAN) per tablet Take 1 tablet by mouth once daily.       No current facility-administered medications on file prior to visit.        Physical Exam:  There were no vitals taken for this visit.  There is no height or weight on file to calculate BMI.  Physical Exam      Labs: Pertinent labs reviewed.  Endoscopy:   CRC Screenin  Anticoagulation: none    Assessment:  1. Abdominal pain, unspecified abdominal location         Recommendations:  -Pain has resolved and has not recurred. DDx include colon spasm, gastritis, constipation. Discussed possible treatment options for these diagnoses, but since it has resolved, I would like to watch and wait. Patient agrees. She will reach out with any additional symptoms.  -Colonoscopy due     Abdominal pain, unspecified abdominal location  -     Ambulatory referral/consult to Gastroenterology        No follow-ups on file.    Thank you so much for allowing me to participate in the care of Cayrn Bass PA-C

## 2020-08-13 NOTE — LETTER
August 13, 2020      Layla Laws MD  [address]  [facility] [address]           [facility]  [address] [place] [id number]  Phone: [phone]  Fax: [phone]          Patient: Caryn Zarate   MR Number: [id number]   YOB: 1970   Date of Visit: 8/13/2020       Dear Dr. Layla Laws:    Thank you for referring Caryn Zarate to me for evaluation. Attached you will find relevant portions of my assessment and plan of care.    If you have questions, please do not hesitate to call me. I look forward to following Caryn Zarate along with you.    Sincerely,    Emily Bass PA-C    Enclosure  CC:  No Recipients    If you would like to receive this communication electronically, please contact [email or web address] or [phone] to request more information on [employer] Link access.    For providers and/or their staff who would like to refer a patient to [facility], please contact us through our one-stop-shop provider referral line, [facility] , at [phone].    If you feel you have received this communication in error or would no longer like to receive these types of communications, please e-mail [email or web address]

## 2020-09-29 ENCOUNTER — PATIENT MESSAGE (OUTPATIENT)
Dept: INTERNAL MEDICINE | Facility: CLINIC | Age: 50
End: 2020-09-29

## 2020-09-30 RX ORDER — FLUCONAZOLE 150 MG/1
TABLET ORAL
Qty: 2 TABLET | Refills: 1 | Status: SHIPPED | OUTPATIENT
Start: 2020-09-30 | End: 2022-07-07

## 2020-12-23 DIAGNOSIS — Z12.31 OTHER SCREENING MAMMOGRAM: ICD-10-CM

## 2021-01-04 ENCOUNTER — PATIENT MESSAGE (OUTPATIENT)
Dept: ADMINISTRATIVE | Facility: HOSPITAL | Age: 51
End: 2021-01-04

## 2021-01-08 ENCOUNTER — HOSPITAL ENCOUNTER (OUTPATIENT)
Dept: RADIOLOGY | Facility: HOSPITAL | Age: 51
Discharge: HOME | End: 2021-01-08
Attending: INTERNAL MEDICINE
Payer: COMMERCIAL

## 2021-01-08 VITALS — WEIGHT: 255 LBS | BODY MASS INDEX: 36.51 KG/M2 | HEIGHT: 70 IN

## 2021-01-08 DIAGNOSIS — Z12.31 OTHER SCREENING MAMMOGRAM: ICD-10-CM

## 2021-01-08 PROCEDURE — 77067 SCR MAMMO BI INCL CAD: CPT | Mod: TC

## 2021-01-08 PROCEDURE — 77067 SCR MAMMO BI INCL CAD: CPT | Mod: 26,,, | Performed by: INTERNAL MEDICINE

## 2021-01-08 PROCEDURE — 77063 BREAST TOMOSYNTHESIS BI: CPT | Mod: 26,,, | Performed by: INTERNAL MEDICINE

## 2021-01-08 PROCEDURE — 77067 MAMMO DIGITAL SCREENING BILAT WITH TOMO: ICD-10-PCS | Mod: 26,,, | Performed by: INTERNAL MEDICINE

## 2021-01-08 PROCEDURE — 77063 MAMMO DIGITAL SCREENING BILAT WITH TOMO: ICD-10-PCS | Mod: 26,,, | Performed by: INTERNAL MEDICINE

## 2021-03-10 ENCOUNTER — OFFICE VISIT (OUTPATIENT)
Dept: HEPATOLOGY | Facility: CLINIC | Age: 51
End: 2021-03-10
Payer: COMMERCIAL

## 2021-03-10 ENCOUNTER — TELEPHONE (OUTPATIENT)
Dept: HEPATOLOGY | Facility: CLINIC | Age: 51
End: 2021-03-10

## 2021-03-10 DIAGNOSIS — R73.01 IFG (IMPAIRED FASTING GLUCOSE): ICD-10-CM

## 2021-03-10 DIAGNOSIS — K76.0 NAFLD (NONALCOHOLIC FATTY LIVER DISEASE): Primary | ICD-10-CM

## 2021-03-10 DIAGNOSIS — E78.2 MIXED HYPERLIPIDEMIA: ICD-10-CM

## 2021-03-10 PROCEDURE — 99214 PR OFFICE/OUTPT VISIT, EST, LEVL IV, 30-39 MIN: ICD-10-PCS | Mod: 95,,, | Performed by: NURSE PRACTITIONER

## 2021-03-10 PROCEDURE — 99214 OFFICE O/P EST MOD 30 MIN: CPT | Mod: 95,,, | Performed by: NURSE PRACTITIONER

## 2021-03-16 ENCOUNTER — PATIENT MESSAGE (OUTPATIENT)
Dept: DERMATOLOGY | Facility: CLINIC | Age: 51
End: 2021-03-16

## 2021-03-17 ENCOUNTER — OFFICE VISIT (OUTPATIENT)
Dept: DERMATOLOGY | Facility: CLINIC | Age: 51
End: 2021-03-17
Payer: COMMERCIAL

## 2021-03-17 DIAGNOSIS — D22.9 BENIGN NEVUS OF SKIN: ICD-10-CM

## 2021-03-17 DIAGNOSIS — L82.1 DERMATOSIS PAPULOSA NIGRA: Primary | ICD-10-CM

## 2021-03-17 PROCEDURE — 99213 PR OFFICE/OUTPT VISIT, EST, LEVL III, 20-29 MIN: ICD-10-PCS | Mod: 95,,, | Performed by: DERMATOLOGY

## 2021-03-17 PROCEDURE — 99213 OFFICE O/P EST LOW 20 MIN: CPT | Mod: 95,,, | Performed by: DERMATOLOGY

## 2021-03-22 ENCOUNTER — PATIENT MESSAGE (OUTPATIENT)
Dept: DERMATOLOGY | Facility: CLINIC | Age: 51
End: 2021-03-22

## 2021-04-05 ENCOUNTER — IMMUNIZATION (OUTPATIENT)
Dept: OBSTETRICS AND GYNECOLOGY | Facility: CLINIC | Age: 51
End: 2021-04-05
Payer: COMMERCIAL

## 2021-04-05 ENCOUNTER — PATIENT MESSAGE (OUTPATIENT)
Dept: ADMINISTRATIVE | Facility: HOSPITAL | Age: 51
End: 2021-04-05

## 2021-04-05 DIAGNOSIS — Z23 NEED FOR VACCINATION: Primary | ICD-10-CM

## 2021-04-05 PROCEDURE — 91300 COVID-19, MRNA, LNP-S, PF, 30 MCG/0.3 ML DOSE VACCINE: ICD-10-PCS | Mod: ,,, | Performed by: FAMILY MEDICINE

## 2021-04-05 PROCEDURE — 0001A COVID-19, MRNA, LNP-S, PF, 30 MCG/0.3 ML DOSE VACCINE: ICD-10-PCS | Mod: CV19,,, | Performed by: FAMILY MEDICINE

## 2021-04-05 PROCEDURE — 91300 COVID-19, MRNA, LNP-S, PF, 30 MCG/0.3 ML DOSE VACCINE: CPT | Mod: ,,, | Performed by: FAMILY MEDICINE

## 2021-04-05 PROCEDURE — 0001A COVID-19, MRNA, LNP-S, PF, 30 MCG/0.3 ML DOSE VACCINE: CPT | Mod: CV19,,, | Performed by: FAMILY MEDICINE

## 2021-04-06 ENCOUNTER — PATIENT MESSAGE (OUTPATIENT)
Dept: INTERNAL MEDICINE | Facility: CLINIC | Age: 51
End: 2021-04-06

## 2021-04-12 ENCOUNTER — TELEPHONE (OUTPATIENT)
Dept: INTERNAL MEDICINE | Facility: CLINIC | Age: 51
End: 2021-04-12

## 2021-04-12 ENCOUNTER — OFFICE VISIT (OUTPATIENT)
Dept: INTERNAL MEDICINE | Facility: CLINIC | Age: 51
End: 2021-04-12
Payer: COMMERCIAL

## 2021-04-12 DIAGNOSIS — R05.9 COUGH: Primary | ICD-10-CM

## 2021-04-12 PROCEDURE — 99214 OFFICE O/P EST MOD 30 MIN: CPT | Mod: 95,,, | Performed by: INTERNAL MEDICINE

## 2021-04-12 PROCEDURE — 99214 PR OFFICE/OUTPT VISIT, EST, LEVL IV, 30-39 MIN: ICD-10-PCS | Mod: 95,,, | Performed by: INTERNAL MEDICINE

## 2021-04-13 ENCOUNTER — HOSPITAL ENCOUNTER (OUTPATIENT)
Dept: RADIOLOGY | Facility: HOSPITAL | Age: 51
Discharge: HOME | End: 2021-04-13
Attending: INTERNAL MEDICINE
Payer: COMMERCIAL

## 2021-04-13 ENCOUNTER — PATIENT MESSAGE (OUTPATIENT)
Dept: INTERNAL MEDICINE | Facility: CLINIC | Age: 51
End: 2021-04-13

## 2021-04-13 DIAGNOSIS — R05.9 COUGH: ICD-10-CM

## 2021-04-13 DIAGNOSIS — R05.9 COUGH: Primary | ICD-10-CM

## 2021-04-13 PROCEDURE — 71046 XR CHEST PA AND LATERAL: ICD-10-PCS | Mod: 26,,, | Performed by: RADIOLOGY

## 2021-04-13 PROCEDURE — 71046 X-RAY EXAM CHEST 2 VIEWS: CPT | Mod: TC

## 2021-04-13 PROCEDURE — 71046 X-RAY EXAM CHEST 2 VIEWS: CPT | Mod: 26,,, | Performed by: RADIOLOGY

## 2021-04-19 ENCOUNTER — PATIENT MESSAGE (OUTPATIENT)
Dept: INTERNAL MEDICINE | Facility: CLINIC | Age: 51
End: 2021-04-19

## 2021-04-19 DIAGNOSIS — R05.9 COUGH: Primary | ICD-10-CM

## 2021-04-22 ENCOUNTER — PATIENT MESSAGE (OUTPATIENT)
Dept: INTERNAL MEDICINE | Facility: CLINIC | Age: 51
End: 2021-04-22

## 2021-04-22 DIAGNOSIS — K21.9 GASTROESOPHAGEAL REFLUX DISEASE, UNSPECIFIED WHETHER ESOPHAGITIS PRESENT: Primary | ICD-10-CM

## 2021-04-22 DIAGNOSIS — R11.10 VOMITING, INTRACTABILITY OF VOMITING NOT SPECIFIED, PRESENCE OF NAUSEA NOT SPECIFIED, UNSPECIFIED VOMITING TYPE: ICD-10-CM

## 2021-04-24 ENCOUNTER — HOSPITAL ENCOUNTER (OUTPATIENT)
Dept: RADIOLOGY | Facility: HOSPITAL | Age: 51
Discharge: HOME | End: 2021-04-24
Attending: INTERNAL MEDICINE
Payer: COMMERCIAL

## 2021-04-24 PROCEDURE — 71250 CT CHEST WITHOUT CONTRAST: ICD-10-PCS | Mod: 26,,, | Performed by: RADIOLOGY

## 2021-04-24 PROCEDURE — 71250 CT THORAX DX C-: CPT | Mod: 26,,, | Performed by: RADIOLOGY

## 2021-04-24 PROCEDURE — 71250 CT THORAX DX C-: CPT | Mod: TC

## 2021-04-27 ENCOUNTER — LAB VISIT (OUTPATIENT)
Dept: FAMILY MEDICINE | Facility: CLINIC | Age: 51
End: 2021-04-27
Payer: COMMERCIAL

## 2021-04-27 ENCOUNTER — IMMUNIZATION (OUTPATIENT)
Dept: OBSTETRICS AND GYNECOLOGY | Facility: CLINIC | Age: 51
End: 2021-04-27

## 2021-04-27 ENCOUNTER — PATIENT MESSAGE (OUTPATIENT)
Dept: INTERNAL MEDICINE | Facility: CLINIC | Age: 51
End: 2021-04-27

## 2021-04-27 DIAGNOSIS — R06.00 DYSPNEA, UNSPECIFIED TYPE: Primary | ICD-10-CM

## 2021-04-27 DIAGNOSIS — R05.9 COUGH: ICD-10-CM

## 2021-04-27 DIAGNOSIS — Z23 NEED FOR VACCINATION: Primary | ICD-10-CM

## 2021-04-27 DIAGNOSIS — R06.83 SNORING: ICD-10-CM

## 2021-04-27 PROCEDURE — 91300 COVID-19, MRNA, LNP-S, PF, 30 MCG/0.3 ML DOSE VACCINE: ICD-10-PCS | Mod: ,,, | Performed by: FAMILY MEDICINE

## 2021-04-27 PROCEDURE — 0002A COVID-19, MRNA, LNP-S, PF, 30 MCG/0.3 ML DOSE VACCINE: CPT | Mod: CV19,,, | Performed by: FAMILY MEDICINE

## 2021-04-27 PROCEDURE — U0005 INFEC AGEN DETEC AMPLI PROBE: HCPCS | Performed by: INTERNAL MEDICINE

## 2021-04-27 PROCEDURE — 91300 COVID-19, MRNA, LNP-S, PF, 30 MCG/0.3 ML DOSE VACCINE: CPT | Mod: ,,, | Performed by: FAMILY MEDICINE

## 2021-04-27 PROCEDURE — 0002A COVID-19, MRNA, LNP-S, PF, 30 MCG/0.3 ML DOSE VACCINE: ICD-10-PCS | Mod: CV19,,, | Performed by: FAMILY MEDICINE

## 2021-04-27 PROCEDURE — U0003 INFECTIOUS AGENT DETECTION BY NUCLEIC ACID (DNA OR RNA); SEVERE ACUTE RESPIRATORY SYNDROME CORONAVIRUS 2 (SARS-COV-2) (CORONAVIRUS DISEASE [COVID-19]), AMPLIFIED PROBE TECHNIQUE, MAKING USE OF HIGH THROUGHPUT TECHNOLOGIES AS DESCRIBED BY CMS-2020-01-R: HCPCS | Performed by: INTERNAL MEDICINE

## 2021-04-28 LAB — SARS-COV-2 RNA RESP QL NAA+PROBE: NOT DETECTED

## 2021-04-29 ENCOUNTER — HOSPITAL ENCOUNTER (OUTPATIENT)
Dept: PULMONOLOGY | Facility: CLINIC | Age: 51
Discharge: HOME | End: 2021-04-29
Payer: COMMERCIAL

## 2021-04-29 ENCOUNTER — PATIENT MESSAGE (OUTPATIENT)
Dept: INTERNAL MEDICINE | Facility: CLINIC | Age: 51
End: 2021-04-29

## 2021-04-29 DIAGNOSIS — R05.9 COUGH: ICD-10-CM

## 2021-04-29 PROCEDURE — 94729 PR C02/MEMBANE DIFFUSE CAPACITY: ICD-10-PCS | Mod: S$GLB,,, | Performed by: INTERNAL MEDICINE

## 2021-04-29 PROCEDURE — 94729 DIFFUSING CAPACITY: CPT | Mod: S$GLB,,, | Performed by: INTERNAL MEDICINE

## 2021-04-29 PROCEDURE — 94060 EVALUATION OF WHEEZING: CPT | Mod: S$GLB,,, | Performed by: INTERNAL MEDICINE

## 2021-04-29 PROCEDURE — 94727 GAS DIL/WSHOT DETER LNG VOL: CPT | Mod: S$GLB,,, | Performed by: INTERNAL MEDICINE

## 2021-04-29 PROCEDURE — 94060 PR EVAL OF BRONCHOSPASM: ICD-10-PCS | Mod: S$GLB,,, | Performed by: INTERNAL MEDICINE

## 2021-04-29 PROCEDURE — 94727 PR PULM FUNCTION TEST BY GAS: ICD-10-PCS | Mod: S$GLB,,, | Performed by: INTERNAL MEDICINE

## 2021-04-30 ENCOUNTER — PATIENT OUTREACH (OUTPATIENT)
Dept: ADMINISTRATIVE | Facility: OTHER | Age: 51
End: 2021-04-30

## 2021-05-05 ENCOUNTER — TELEPHONE (OUTPATIENT)
Dept: PULMONOLOGY | Facility: CLINIC | Age: 51
End: 2021-05-05

## 2021-05-17 ENCOUNTER — HOSPITAL ENCOUNTER (OUTPATIENT)
Dept: SLEEP MEDICINE | Facility: HOSPITAL | Age: 51
Discharge: HOME | End: 2021-05-17
Attending: INTERNAL MEDICINE
Payer: COMMERCIAL

## 2021-05-17 DIAGNOSIS — R06.83 SNORING: ICD-10-CM

## 2021-05-17 PROCEDURE — 95800 SLP STDY UNATTENDED: CPT

## 2021-05-17 PROCEDURE — 95800 PR SLEEP STUDY, UNATTENDED, RECORD HEART RATE/O2 SAT/RESP ANAL/SLEEP TIME: ICD-10-PCS | Mod: 26,,, | Performed by: INTERNAL MEDICINE

## 2021-05-17 PROCEDURE — 95800 SLP STDY UNATTENDED: CPT | Mod: 26,,, | Performed by: INTERNAL MEDICINE

## 2021-05-20 ENCOUNTER — PATIENT MESSAGE (OUTPATIENT)
Dept: INTERNAL MEDICINE | Facility: CLINIC | Age: 51
End: 2021-05-20

## 2021-05-20 ENCOUNTER — TELEPHONE (OUTPATIENT)
Dept: INTERNAL MEDICINE | Facility: CLINIC | Age: 51
End: 2021-05-20

## 2021-05-20 ENCOUNTER — TELEPHONE (OUTPATIENT)
Dept: SLEEP MEDICINE | Facility: HOSPITAL | Age: 51
End: 2021-05-20

## 2021-05-20 DIAGNOSIS — G47.33 OBSTRUCTIVE SLEEP APNEA SYNDROME: Primary | ICD-10-CM

## 2021-05-21 ENCOUNTER — TELEPHONE (OUTPATIENT)
Dept: SLEEP MEDICINE | Facility: HOSPITAL | Age: 51
End: 2021-05-21

## 2021-06-07 ENCOUNTER — OFFICE VISIT (OUTPATIENT)
Dept: INTERNAL MEDICINE | Facility: CLINIC | Age: 51
End: 2021-06-07
Payer: COMMERCIAL

## 2021-06-07 VITALS
HEIGHT: 70 IN | WEIGHT: 263.88 LBS | BODY MASS INDEX: 37.78 KG/M2 | SYSTOLIC BLOOD PRESSURE: 124 MMHG | DIASTOLIC BLOOD PRESSURE: 78 MMHG | HEART RATE: 85 BPM

## 2021-06-07 DIAGNOSIS — G47.33 OBSTRUCTIVE SLEEP APNEA SYNDROME: ICD-10-CM

## 2021-06-07 DIAGNOSIS — N64.4 BREAST PAIN, LEFT: Primary | ICD-10-CM

## 2021-06-07 DIAGNOSIS — E66.01 SEVERE OBESITY (BMI 35.0-35.9 WITH COMORBIDITY): ICD-10-CM

## 2021-06-07 PROCEDURE — 3008F BODY MASS INDEX DOCD: CPT | Mod: CPTII,S$GLB,, | Performed by: INTERNAL MEDICINE

## 2021-06-07 PROCEDURE — 99999 PR PBB SHADOW E&M-EST. PATIENT-LVL III: ICD-10-PCS | Mod: PBBFAC,,, | Performed by: INTERNAL MEDICINE

## 2021-06-07 PROCEDURE — 3008F PR BODY MASS INDEX (BMI) DOCUMENTED: ICD-10-PCS | Mod: CPTII,S$GLB,, | Performed by: INTERNAL MEDICINE

## 2021-06-07 PROCEDURE — 99214 PR OFFICE/OUTPT VISIT, EST, LEVL IV, 30-39 MIN: ICD-10-PCS | Mod: S$GLB,,, | Performed by: INTERNAL MEDICINE

## 2021-06-07 PROCEDURE — 1125F AMNT PAIN NOTED PAIN PRSNT: CPT | Mod: S$GLB,,, | Performed by: INTERNAL MEDICINE

## 2021-06-07 PROCEDURE — 99999 PR PBB SHADOW E&M-EST. PATIENT-LVL III: CPT | Mod: PBBFAC,,, | Performed by: INTERNAL MEDICINE

## 2021-06-07 PROCEDURE — 99214 OFFICE O/P EST MOD 30 MIN: CPT | Mod: S$GLB,,, | Performed by: INTERNAL MEDICINE

## 2021-06-07 PROCEDURE — 1125F PR PAIN SEVERITY QUANTIFIED, PAIN PRESENT: ICD-10-PCS | Mod: S$GLB,,, | Performed by: INTERNAL MEDICINE

## 2021-09-28 ENCOUNTER — TELEPHONE (OUTPATIENT)
Dept: HEPATOLOGY | Facility: CLINIC | Age: 51
End: 2021-09-28

## 2021-09-28 ENCOUNTER — PATIENT MESSAGE (OUTPATIENT)
Dept: HEPATOLOGY | Facility: CLINIC | Age: 51
End: 2021-09-28

## 2021-09-30 ENCOUNTER — PATIENT MESSAGE (OUTPATIENT)
Dept: INTERNAL MEDICINE | Facility: CLINIC | Age: 51
End: 2021-09-30

## 2021-09-30 DIAGNOSIS — K76.0 NAFLD (NONALCOHOLIC FATTY LIVER DISEASE): Primary | ICD-10-CM

## 2021-09-30 DIAGNOSIS — Z12.11 COLON CANCER SCREENING: Primary | ICD-10-CM

## 2021-12-02 ENCOUNTER — PATIENT MESSAGE (OUTPATIENT)
Dept: INTERNAL MEDICINE | Facility: CLINIC | Age: 51
End: 2021-12-02
Payer: COMMERCIAL

## 2021-12-02 DIAGNOSIS — F33.41 RECURRENT MAJOR DEPRESSIVE DISORDER, IN PARTIAL REMISSION: Primary | ICD-10-CM

## 2021-12-03 ENCOUNTER — PATIENT MESSAGE (OUTPATIENT)
Dept: INTERNAL MEDICINE | Facility: CLINIC | Age: 51
End: 2021-12-03
Payer: COMMERCIAL

## 2021-12-17 ENCOUNTER — TELEPHONE (OUTPATIENT)
Dept: INTERNAL MEDICINE | Facility: CLINIC | Age: 51
End: 2021-12-17
Payer: COMMERCIAL

## 2021-12-17 ENCOUNTER — TELEPHONE (OUTPATIENT)
Dept: BEHAVIORAL HEALTH | Facility: CLINIC | Age: 51
End: 2021-12-17
Payer: COMMERCIAL

## 2021-12-17 ENCOUNTER — PATIENT MESSAGE (OUTPATIENT)
Dept: INTERNAL MEDICINE | Facility: CLINIC | Age: 51
End: 2021-12-17
Payer: COMMERCIAL

## 2021-12-17 ENCOUNTER — PATIENT MESSAGE (OUTPATIENT)
Dept: BEHAVIORAL HEALTH | Facility: CLINIC | Age: 51
End: 2021-12-17
Payer: COMMERCIAL

## 2022-01-26 DIAGNOSIS — Z12.31 OTHER SCREENING MAMMOGRAM: ICD-10-CM

## 2022-01-27 ENCOUNTER — PATIENT MESSAGE (OUTPATIENT)
Dept: INTERNAL MEDICINE | Facility: CLINIC | Age: 52
End: 2022-01-27
Payer: COMMERCIAL

## 2022-01-27 DIAGNOSIS — Z12.11 COLON CANCER SCREENING: Primary | ICD-10-CM

## 2022-02-07 DIAGNOSIS — Z01.812 PRE-PROCEDURE LAB EXAM: ICD-10-CM

## 2022-03-21 ENCOUNTER — PATIENT MESSAGE (OUTPATIENT)
Dept: ENDOSCOPY | Facility: HOSPITAL | Age: 52
End: 2022-03-21
Payer: COMMERCIAL

## 2022-03-21 ENCOUNTER — LAB VISIT (OUTPATIENT)
Dept: FAMILY MEDICINE | Facility: CLINIC | Age: 52
End: 2022-03-21
Payer: COMMERCIAL

## 2022-03-21 DIAGNOSIS — Z12.11 SCREEN FOR COLON CANCER: Primary | ICD-10-CM

## 2022-03-21 DIAGNOSIS — Z01.812 PRE-PROCEDURE LAB EXAM: ICD-10-CM

## 2022-03-21 LAB
SARS-COV-2 RNA RESP QL NAA+PROBE: NOT DETECTED
SARS-COV-2- CYCLE NUMBER: NORMAL

## 2022-03-21 PROCEDURE — U0003 INFECTIOUS AGENT DETECTION BY NUCLEIC ACID (DNA OR RNA); SEVERE ACUTE RESPIRATORY SYNDROME CORONAVIRUS 2 (SARS-COV-2) (CORONAVIRUS DISEASE [COVID-19]), AMPLIFIED PROBE TECHNIQUE, MAKING USE OF HIGH THROUGHPUT TECHNOLOGIES AS DESCRIBED BY CMS-2020-01-R: HCPCS | Performed by: CLINICAL NURSE SPECIALIST

## 2022-03-21 PROCEDURE — U0005 INFEC AGEN DETEC AMPLI PROBE: HCPCS | Performed by: CLINICAL NURSE SPECIALIST

## 2022-03-21 RX ORDER — POLYETHYLENE GLYCOL 3350, SODIUM SULFATE ANHYDROUS, SODIUM BICARBONATE, SODIUM CHLORIDE, POTASSIUM CHLORIDE 236; 22.74; 6.74; 5.86; 2.97 G/4L; G/4L; G/4L; G/4L; G/4L
4 POWDER, FOR SOLUTION ORAL ONCE
Qty: 4000 ML | Refills: 0 | Status: SHIPPED | OUTPATIENT
Start: 2022-03-21 | End: 2022-03-21

## 2022-03-24 ENCOUNTER — HOSPITAL ENCOUNTER (OUTPATIENT)
Facility: HOSPITAL | Age: 52
Discharge: HOME | End: 2022-03-24
Attending: STUDENT IN AN ORGANIZED HEALTH CARE EDUCATION/TRAINING PROGRAM | Admitting: STUDENT IN AN ORGANIZED HEALTH CARE EDUCATION/TRAINING PROGRAM
Payer: COMMERCIAL

## 2022-03-24 ENCOUNTER — HOSPITAL ENCOUNTER (OUTPATIENT)
Dept: RADIOLOGY | Facility: HOSPITAL | Age: 52
Discharge: HOME | End: 2022-03-24
Attending: INTERNAL MEDICINE
Payer: COMMERCIAL

## 2022-03-24 ENCOUNTER — ANESTHESIA EVENT (OUTPATIENT)
Dept: ENDOSCOPY | Facility: HOSPITAL | Age: 52
End: 2022-03-24
Payer: COMMERCIAL

## 2022-03-24 ENCOUNTER — ANESTHESIA (OUTPATIENT)
Dept: ENDOSCOPY | Facility: HOSPITAL | Age: 52
End: 2022-03-24
Payer: COMMERCIAL

## 2022-03-24 VITALS
BODY MASS INDEX: 36.22 KG/M2 | WEIGHT: 253 LBS | HEIGHT: 70 IN | HEART RATE: 65 BPM | DIASTOLIC BLOOD PRESSURE: 68 MMHG | OXYGEN SATURATION: 97 % | TEMPERATURE: 98 F | RESPIRATION RATE: 18 BRPM | SYSTOLIC BLOOD PRESSURE: 110 MMHG

## 2022-03-24 DIAGNOSIS — Z12.11 ENCOUNTER FOR SCREENING COLONOSCOPY: ICD-10-CM

## 2022-03-24 DIAGNOSIS — Z12.31 OTHER SCREENING MAMMOGRAM: ICD-10-CM

## 2022-03-24 PROCEDURE — 77067 SCR MAMMO BI INCL CAD: CPT | Mod: 26,,, | Performed by: RADIOLOGY

## 2022-03-24 PROCEDURE — 45380 COLONOSCOPY AND BIOPSY: CPT | Mod: PT | Performed by: STUDENT IN AN ORGANIZED HEALTH CARE EDUCATION/TRAINING PROGRAM

## 2022-03-24 PROCEDURE — 77067 MAMMO DIGITAL SCREENING BILAT WITH TOMO: ICD-10-PCS | Mod: 26,,, | Performed by: RADIOLOGY

## 2022-03-24 PROCEDURE — E9220 PRA ENDO ANESTHESIA: ICD-10-PCS | Mod: 33,,, | Performed by: NURSE ANESTHETIST, CERTIFIED REGISTERED

## 2022-03-24 PROCEDURE — 63600175 PHARM REV CODE 636 W HCPCS: Performed by: NURSE ANESTHETIST, CERTIFIED REGISTERED

## 2022-03-24 PROCEDURE — 25000003 PHARM REV CODE 250: Performed by: NURSE ANESTHETIST, CERTIFIED REGISTERED

## 2022-03-24 PROCEDURE — 88305 TISSUE EXAM BY PATHOLOGIST: ICD-10-PCS | Mod: 26,,, | Performed by: PATHOLOGY

## 2022-03-24 PROCEDURE — 45380 COLONOSCOPY AND BIOPSY: CPT | Mod: 33,,, | Performed by: STUDENT IN AN ORGANIZED HEALTH CARE EDUCATION/TRAINING PROGRAM

## 2022-03-24 PROCEDURE — 27201012 HC FORCEPS, HOT/COLD, DISP: Performed by: STUDENT IN AN ORGANIZED HEALTH CARE EDUCATION/TRAINING PROGRAM

## 2022-03-24 PROCEDURE — 77063 MAMMO DIGITAL SCREENING BILAT WITH TOMO: ICD-10-PCS | Mod: 26,,, | Performed by: RADIOLOGY

## 2022-03-24 PROCEDURE — 88305 TISSUE EXAM BY PATHOLOGIST: CPT | Performed by: PATHOLOGY

## 2022-03-24 PROCEDURE — 77063 BREAST TOMOSYNTHESIS BI: CPT | Mod: TC,PO

## 2022-03-24 PROCEDURE — 37000009 HC ANESTHESIA EA ADD 15 MINS: Performed by: STUDENT IN AN ORGANIZED HEALTH CARE EDUCATION/TRAINING PROGRAM

## 2022-03-24 PROCEDURE — 77067 SCR MAMMO BI INCL CAD: CPT | Mod: TC,PO

## 2022-03-24 PROCEDURE — 37000008 HC ANESTHESIA 1ST 15 MINUTES: Performed by: STUDENT IN AN ORGANIZED HEALTH CARE EDUCATION/TRAINING PROGRAM

## 2022-03-24 PROCEDURE — E9220 PRA ENDO ANESTHESIA: HCPCS | Mod: 33,,, | Performed by: NURSE ANESTHETIST, CERTIFIED REGISTERED

## 2022-03-24 PROCEDURE — 88305 TISSUE EXAM BY PATHOLOGIST: CPT | Mod: 26,,, | Performed by: PATHOLOGY

## 2022-03-24 PROCEDURE — 45380 PR COLONOSCOPY,BIOPSY: ICD-10-PCS | Mod: 33,,, | Performed by: STUDENT IN AN ORGANIZED HEALTH CARE EDUCATION/TRAINING PROGRAM

## 2022-03-24 PROCEDURE — 77063 BREAST TOMOSYNTHESIS BI: CPT | Mod: 26,,, | Performed by: RADIOLOGY

## 2022-03-24 PROCEDURE — 25000003 PHARM REV CODE 250: Performed by: STUDENT IN AN ORGANIZED HEALTH CARE EDUCATION/TRAINING PROGRAM

## 2022-03-24 RX ORDER — SODIUM CHLORIDE 0.9 % (FLUSH) 0.9 %
3 SYRINGE (ML) INJECTION
Status: DISCONTINUED | OUTPATIENT
Start: 2022-03-24 | End: 2022-03-24 | Stop reason: HOSPADM

## 2022-03-24 RX ORDER — PROPOFOL 10 MG/ML
VIAL (ML) INTRAVENOUS CONTINUOUS PRN
Status: DISCONTINUED | OUTPATIENT
Start: 2022-03-24 | End: 2022-03-24

## 2022-03-24 RX ORDER — PROPOFOL 10 MG/ML
VIAL (ML) INTRAVENOUS
Status: DISCONTINUED | OUTPATIENT
Start: 2022-03-24 | End: 2022-03-24

## 2022-03-24 RX ORDER — SODIUM CHLORIDE 9 MG/ML
INJECTION, SOLUTION INTRAVENOUS CONTINUOUS
Status: DISCONTINUED | OUTPATIENT
Start: 2022-03-24 | End: 2022-03-24 | Stop reason: HOSPADM

## 2022-03-24 RX ORDER — LIDOCAINE HYDROCHLORIDE 20 MG/ML
INJECTION INTRAVENOUS
Status: DISCONTINUED | OUTPATIENT
Start: 2022-03-24 | End: 2022-03-24

## 2022-03-24 RX ADMIN — SODIUM CHLORIDE: 0.9 INJECTION, SOLUTION INTRAVENOUS at 10:03

## 2022-03-24 RX ADMIN — LIDOCAINE HYDROCHLORIDE 75 MG: 20 INJECTION, SOLUTION INTRAVENOUS at 10:03

## 2022-03-24 RX ADMIN — PROPOFOL 80 MG: 10 INJECTION, EMULSION INTRAVENOUS at 10:03

## 2022-03-24 RX ADMIN — Medication 200 MCG/KG/MIN: at 10:03

## 2022-03-24 NOTE — TRANSFER OF CARE
"Anesthesia Transfer of Care Note    Patient: Caryn Zarate    Procedure(s) Performed: Procedure(s) (LRB):  COLONOSCOPY (N/A)    Patient location: OPS    Anesthesia Type: general    Transport from OR: Transported from OR on room air with adequate spontaneous ventilation    Post pain: adequate analgesia    Post assessment: no apparent anesthetic complications and tolerated procedure well    Post vital signs: stable    Level of consciousness: awake, alert and oriented    Nausea/Vomiting: no nausea/vomiting    Complications: none    Transfer of care protocol was followed      Last vitals:   Visit Vitals  BP (!) 140/83 (BP Location: Left arm, Patient Position: Lying)   Pulse 87   Temp 36.4 °C (97.5 °F) (Temporal)   Resp 16   Ht 5' 10" (1.778 m)   Wt 114.8 kg (253 lb)   LMP  (LMP Unknown)   SpO2 98%   Breastfeeding No   BMI 36.30 kg/m²     "

## 2022-03-24 NOTE — PROVATION PATIENT INSTRUCTIONS
Discharge Summary/Instructions after an Endoscopic Procedure  Patient Name: Caryn Zarate  Patient MRN: [id number]  Patient YOB: 1970 Thursday, March 24, 2022  Doe Rebolledo MD  Dear patient,  As a result of recent federal legislation (The Federal Cures Act), you may   receive lab or pathology results from your procedure in your [facility]   account before your physician is able to contact you. Your physician or   their representative will relay the results to you with their   recommendations at their soonest availability.  Thank you,  RESTRICTIONS:  During your procedure today, you received medications for sedation.  These   medications may affect your judgment, balance and coordination.  Therefore,   for 24 hours, you have the following restrictions:   - DO NOT drive a car, operate machinery, make legal/financial decisions,   sign important papers or drink alcohol.    ACTIVITY:  Today: no heavy lifting, straining or running due to procedural   sedation/anesthesia.  The following day: return to full activity including work.  DIET:  Eat and drink normally unless instructed otherwise.     TREATMENT FOR COMMON SIDE EFFECTS:  - Mild abdominal pain, nausea, belching, bloating or excessive gas:  rest,   eat lightly and use a heating pad.  - Sore Throat: treat with throat lozenges and/or gargle with warm salt   water.  - Because air was used during the procedure, expelling large amounts of air   from your rectum or belching is normal.  - If a bowel prep was taken, you may not have a bowel movement for 1-3 days.    This is normal.  SYMPTOMS TO WATCH FOR AND REPORT TO YOUR PHYSICIAN:  1. Abdominal pain or bloating, other than gas cramps.  2. Chest pain.  3. Back pain.  4. Signs of infection such as: chills or fever occurring within 24 hours   after the procedure.  5. Rectal bleeding, which would show as bright red, maroon, or black stools.   (A tablespoon of blood from the rectum is not serious, especially  if   hemorrhoids are present.)  6. Vomiting.  7. Weakness or dizziness.  GO DIRECTLY TO THE NEAREST EMERGENCY ROOM IF YOU HAVE ANY OF THE FOLLOWING:      Difficulty breathing              Chills and/or fever over 101 F   Persistent vomiting and/or vomiting blood   Severe abdominal pain   Severe chest pain   Black, tarry stools   Bleeding- more than one tablespoon   Any other symptom or condition that you feel may need urgent attention  Your doctor recommends these additional instructions:  If any biopsies were taken, your doctors clinic will contact you in 1 to 2   weeks with any results.  - Discharge patient to home.   - Resume previous diet.   - Continue present medications.   - Await pathology results.   - Repeat colonoscopy in 7 years for surveillance based on pathology results.     - Return to referring physician as previously scheduled.  For questions, problems or results please call your physician - Doe Rebolledo MD at Work:  [phone].  [facility] EMERGENCY ROOM PHONE NUMBER: [phone]  IF A COMPLICATION OR EMERGENCY SITUATION ARISES AND YOU ARE UNABLE TO REACH   YOUR PHYSICIAN - GO DIRECTLY TO THE EMERGENCY ROOM.  MD Doe Pinto MD  3/24/2022 11:22:14 AM  This report has been verified and signed electronically.  Dear patient,  As a result of recent federal legislation (The Federal Cures Act), you may   receive lab or pathology results from your procedure in your [facility]   account before your physician is able to contact you. Your physician or   their representative will relay the results to you with their   recommendations at their soonest availability.  Thank you,  PROVATION

## 2022-03-24 NOTE — ANESTHESIA POSTPROCEDURE EVALUATION
Anesthesia Post Evaluation    Patient: Caryn Zarate    Procedure(s) Performed: Procedure(s) (LRB):  COLONOSCOPY (N/A)    Final Anesthesia Type: general      Patient location during evaluation: PACU  Patient participation: Yes- Able to Participate  Level of consciousness: awake and alert  Post-procedure vital signs: reviewed and stable  Pain management: adequate  Airway patency: patent    PONV status at discharge: No PONV  Anesthetic complications: no      Cardiovascular status: blood pressure returned to baseline  Respiratory status: unassisted  Hydration status: euvolemic  Follow-up not needed.          Vitals Value Taken Time   /68 03/24/22 1149   Temp 36.4 °C (97.5 °F) 03/24/22 1122   Pulse 65 03/24/22 1149   Resp 18 03/24/22 1149   SpO2 97 % 03/24/22 1149         Event Time   Out of Recovery 12:04:10         Pain/Wilfred Score: Wilfred Score: 10 (3/24/2022 11:45 AM)

## 2022-03-24 NOTE — H&P
"COLONOSCOPY HISTORY AND PHYSICAL EXAM    Procedure : Colonoscopy      INDICATIONS: asymptomatic screening exam    Family Hx of CRC: No    Last Colonoscopy:  2018 [place]  Findings: no abnormalities.  Poor prep recommended 3 year follow up       Past Medical History:   Diagnosis Date    Depression     Fatty liver: see u/s 3/18 3/21/2018    IFG (impaired fasting glucose) 3/18/2016    Obesity     Renal cyst, left: see u/s 3/18 3/21/2018    Sleep apnea 5/26/2014    Vitamin D deficiency disease 3/18/2016     Sedation Problems: NO  Family History   Problem Relation Age of Onset    Hypertension Mother     Diabetes Father     Kidney disease Father         Dialysis    Hypertension Father     Cancer Maternal Grandmother         breast    Breast cancer Maternal Grandmother     No Known Problems Daughter     No Known Problems Daughter     Breast cancer Unknown     Colon cancer Neg Hx     Ovarian cancer Neg Hx     Melanoma Neg Hx     Psoriasis Neg Hx     Lupus Neg Hx     Cirrhosis Neg Hx      Fam Hx of Sedation Problems: NO  Social History     Socioeconomic History    Marital status:     Number of children: 2   Occupational History     Employer: [employer]   Tobacco Use    Smoking status: Never Smoker    Smokeless tobacco: Never Used   Substance and Sexual Activity    Alcohol use: Yes     Comment: rarely    Drug use: No    Sexual activity: Yes     Partners: Male     Birth control/protection: Surgical       Review of Systems - Negative except   Respiratory ROS: no dyspnea  Cardiovascular ROS: no exertional chest pain  Gastrointestinal ROS: NO abdominal discomfort,  NO rectal bleeding  Musculoskeletal ROS: no muscular pain  Neurological ROS: no recent stroke    Physical Exam:  /66 (BP Location: Left arm, Patient Position: Lying)   Pulse 74   Temp 97.9 °F (36.6 °C) (Temporal)   Resp 18   Ht 5' 10" (1.778 m)   Wt 114.8 kg (253 lb)   LMP  (LMP Unknown)   SpO2 97%   " Breastfeeding No   BMI 36.30 kg/m²   General: no distress  Head: normocephalic  Mallampati Score   Neck: supple, symmetrical, trachea midline  Lungs:  clear to auscultation bilaterally and normal respiratory effort  Heart: regular rate and rhythm and no murmur  Abdomen: soft, non-tender non-distented; bowel sounds normal; no masses,  no organomegaly  Extremities: no cyanosis or edema, or clubbing    ASA:  II    PLAN  COLONOSCOPY.    SedationPlan :MAC    The details of the procedure, the possible need for biopsy or polypectomy and the potential risks including bleeding, perforation, missed polyps were discussed in detail.

## 2022-03-24 NOTE — ANESTHESIA PREPROCEDURE EVALUATION
03/24/2022  Caryn Zarate is a 51 y.o., female.      Pre-op Assessment    I have reviewed the Patient Summary Reports.       I have reviewed the Medications.     Review of Systems  Anesthesia Hx:  No problems with previous Anesthesia  History of prior surgery of interest to airway management or planning: Previous anesthesia: General   Social:  Non-Smoker, Social Alcohol Use    Hematology/Oncology:  Hematology Normal   Oncology Normal     EENT/Dental:EENT/Dental Normal   Cardiovascular:   Exercise tolerance: good    Pulmonary:   Sleep Apnea    Renal/:   Chronic Renal Disease Renal cyst   Hepatic/GI:   Liver Disease, Fatty liver   Musculoskeletal:  Musculoskeletal Normal    Neurological:   Neuromuscular Disease,    Endocrine:  Endocrine Normal    Dermatological:  Skin Normal    Psych:   Psychiatric History          Physical Exam  General: Well nourished    Airway:  Mallampati: II   Mouth Opening: Normal  TM Distance: Normal  Neck ROM: Normal ROM    Dental:  Intact        Anesthesia Plan  Type of Anesthesia, risks & benefits discussed:    Anesthesia Type: MAC, Gen Natural Airway, Gen Supraglottic Airway, Gen ETT  Post Op Pain Control Plan: multimodal analgesia and IV/PO Opioids PRN  Induction:  IV  Informed Consent: Informed consent signed with the Patient and all parties understand the risks and agree with anesthesia plan.  All questions answered.   ASA Score: 3    Ready For Surgery From Anesthesia Perspective.     .

## 2022-03-31 LAB
FINAL PATHOLOGIC DIAGNOSIS: NORMAL
Lab: NORMAL

## 2022-04-21 ENCOUNTER — PATIENT OUTREACH (OUTPATIENT)
Dept: ADMINISTRATIVE | Facility: OTHER | Age: 52
End: 2022-04-21
Payer: COMMERCIAL

## 2022-04-21 NOTE — PROGRESS NOTES
Health Maintenance Due   Topic Date Due    Shingles Vaccine (1 of 2) Never done    Influenza Vaccine (1) 09/01/2021    COVID-19 Vaccine (3 - Booster for Pfizer series) 09/27/2021     Updates were requested from care everywhere.  Chart was reviewed for overdue Proactive [facility] Encounters (TERRI) topics (CRS, Breast Cancer Screening, Eye exam)  Health Maintenance has been updated.  LINKS immunization registry triggered.  Immunizations were reconciled.

## 2022-06-30 ENCOUNTER — PATIENT MESSAGE (OUTPATIENT)
Dept: INTERNAL MEDICINE | Facility: CLINIC | Age: 52
End: 2022-06-30
Payer: COMMERCIAL

## 2022-06-30 DIAGNOSIS — Z00.00 ANNUAL PHYSICAL EXAM: ICD-10-CM

## 2022-06-30 DIAGNOSIS — M54.9 BILATERAL BACK PAIN, UNSPECIFIED BACK LOCATION, UNSPECIFIED CHRONICITY: Primary | ICD-10-CM

## 2022-06-30 NOTE — TELEPHONE ENCOUNTER
Please let her know that I did order the physical therapy    I do need to see her for her annual exam sometime in the next 3-4 months, please schedule, can use my Wednesday afternoon time if necessary    Usual labs prior, please pend, thank you

## 2022-07-07 ENCOUNTER — HOSPITAL ENCOUNTER (OUTPATIENT)
Dept: RADIOLOGY | Facility: HOSPITAL | Age: 52
Discharge: HOME | End: 2022-07-07
Attending: INTERNAL MEDICINE
Payer: COMMERCIAL

## 2022-07-07 ENCOUNTER — TELEPHONE (OUTPATIENT)
Dept: SPINE | Facility: CLINIC | Age: 52
End: 2022-07-07
Payer: COMMERCIAL

## 2022-07-07 ENCOUNTER — OFFICE VISIT (OUTPATIENT)
Dept: INTERNAL MEDICINE | Facility: CLINIC | Age: 52
End: 2022-07-07
Payer: COMMERCIAL

## 2022-07-07 VITALS
WEIGHT: 257 LBS | HEIGHT: 70 IN | DIASTOLIC BLOOD PRESSURE: 70 MMHG | BODY MASS INDEX: 36.79 KG/M2 | SYSTOLIC BLOOD PRESSURE: 120 MMHG

## 2022-07-07 DIAGNOSIS — K63.5 SERRATED POLYP OF COLON: ICD-10-CM

## 2022-07-07 DIAGNOSIS — E66.01 SEVERE OBESITY (BMI 35.0-35.9 WITH COMORBIDITY): ICD-10-CM

## 2022-07-07 DIAGNOSIS — Z00.00 ANNUAL PHYSICAL EXAM: Primary | ICD-10-CM

## 2022-07-07 DIAGNOSIS — M54.9 DORSALGIA, UNSPECIFIED: ICD-10-CM

## 2022-07-07 DIAGNOSIS — M54.50 CHRONIC MIDLINE LOW BACK PAIN WITHOUT SCIATICA: ICD-10-CM

## 2022-07-07 DIAGNOSIS — F33.41 RECURRENT MAJOR DEPRESSIVE DISORDER, IN PARTIAL REMISSION: ICD-10-CM

## 2022-07-07 DIAGNOSIS — G89.29 CHRONIC MIDLINE LOW BACK PAIN WITHOUT SCIATICA: ICD-10-CM

## 2022-07-07 PROCEDURE — 99999 PR PBB SHADOW E&M-EST. PATIENT-LVL IV: ICD-10-PCS | Mod: PBBFAC,,, | Performed by: INTERNAL MEDICINE

## 2022-07-07 PROCEDURE — 99999 PR PBB SHADOW E&M-EST. PATIENT-LVL IV: CPT | Mod: PBBFAC,,, | Performed by: INTERNAL MEDICINE

## 2022-07-07 PROCEDURE — 3074F SYST BP LT 130 MM HG: CPT | Mod: CPTII,S$GLB,, | Performed by: INTERNAL MEDICINE

## 2022-07-07 PROCEDURE — 72202 X-RAY EXAM SI JOINTS 3/> VWS: CPT | Mod: TC

## 2022-07-07 PROCEDURE — 72100 X-RAY EXAM L-S SPINE 2/3 VWS: CPT | Mod: TC

## 2022-07-07 PROCEDURE — 72100 XR LUMBAR SPINE AP AND LATERAL: ICD-10-PCS | Mod: 26,,, | Performed by: RADIOLOGY

## 2022-07-07 PROCEDURE — 3008F PR BODY MASS INDEX (BMI) DOCUMENTED: ICD-10-PCS | Mod: CPTII,S$GLB,, | Performed by: INTERNAL MEDICINE

## 2022-07-07 PROCEDURE — 3074F PR MOST RECENT SYSTOLIC BLOOD PRESSURE < 130 MM HG: ICD-10-PCS | Mod: CPTII,S$GLB,, | Performed by: INTERNAL MEDICINE

## 2022-07-07 PROCEDURE — 72100 X-RAY EXAM L-S SPINE 2/3 VWS: CPT | Mod: 26,,, | Performed by: RADIOLOGY

## 2022-07-07 PROCEDURE — 72202 X-RAY EXAM SI JOINTS 3/> VWS: CPT | Mod: 26,,, | Performed by: RADIOLOGY

## 2022-07-07 PROCEDURE — 99396 PREV VISIT EST AGE 40-64: CPT | Mod: S$GLB,,, | Performed by: INTERNAL MEDICINE

## 2022-07-07 PROCEDURE — 3078F PR MOST RECENT DIASTOLIC BLOOD PRESSURE < 80 MM HG: ICD-10-PCS | Mod: CPTII,S$GLB,, | Performed by: INTERNAL MEDICINE

## 2022-07-07 PROCEDURE — 72202 XR SACROILIAC JOINTS COMPLETE: ICD-10-PCS | Mod: 26,,, | Performed by: RADIOLOGY

## 2022-07-07 PROCEDURE — 99396 PR PREVENTIVE VISIT,EST,40-64: ICD-10-PCS | Mod: S$GLB,,, | Performed by: INTERNAL MEDICINE

## 2022-07-07 PROCEDURE — 3078F DIAST BP <80 MM HG: CPT | Mod: CPTII,S$GLB,, | Performed by: INTERNAL MEDICINE

## 2022-07-07 PROCEDURE — 3008F BODY MASS INDEX DOCD: CPT | Mod: CPTII,S$GLB,, | Performed by: INTERNAL MEDICINE

## 2022-07-07 RX ORDER — DULOXETIN HYDROCHLORIDE 60 MG/1
CAPSULE, DELAYED RELEASE ORAL
Qty: 30 CAPSULE | Refills: 3 | Status: SHIPPED | OUTPATIENT
Start: 2022-07-07 | End: 2022-07-15

## 2022-07-07 RX ORDER — METHOCARBAMOL 500 MG/1
500 TABLET, FILM COATED ORAL 4 TIMES DAILY
Qty: 40 TABLET | Refills: 0 | Status: SHIPPED | OUTPATIENT
Start: 2022-07-07 | End: 2022-07-19 | Stop reason: SDUPTHER

## 2022-07-07 RX ORDER — POLYETHYLENE GLYCOL 3350, SODIUM SULFATE ANHYDROUS, SODIUM BICARBONATE, SODIUM CHLORIDE, POTASSIUM CHLORIDE 236; 22.74; 6.74; 5.86; 2.97 G/4L; G/4L; G/4L; G/4L; G/4L
POWDER, FOR SOLUTION ORAL
COMMUNITY
Start: 2022-03-21 | End: 2022-07-19

## 2022-07-07 RX ORDER — METHYLPREDNISOLONE 4 MG/1
TABLET ORAL
Qty: 1 EACH | Refills: 0 | Status: SHIPPED | OUTPATIENT
Start: 2022-07-07 | End: 2022-07-19

## 2022-07-07 NOTE — TELEPHONE ENCOUNTER
This message is for patient in regards to his or hers appointment 07/08/22 at 3:00 pm with Dr.Christine Bi M.D.    On the 4th floor [address] in the [facility] and spine center. We do ask that patients arrive 15 minutes before appointment time.  Patient may contact [phone] if there is any questions or concerns. Thank you for entrusting in [facility] with your care.

## 2022-07-07 NOTE — PROGRESS NOTES
"Subjective:       Patient ID: Caryn Zarate is a 52 y.o. female.    Chief Complaint: Annual Exam    Annual    Had a colonoscopy, had a polyp; follow-up reviewed.    Back pain since May 30 when working on garden- leaning over and picking up pavers.    Radiates to leg on R.  No weakness.  However sometimes R leg goes numb when walking.  No bladder or bowel sx.    MRI 2019:  1. Lumbar degenerative changes contributing to mild spinal canal stenosis at L4-L5 and mild-to-moderate neural foraminal narrowing at L4-L5 and L5-S1.  See above for additional details.  2. Left-sided facet edema at L4-L5, possibly contributing to the patient's pain.    Seen in [facility] 1/20:  "PT through [facility]  -Continue ibuprofen and robaxin PRN  -Could consider injections in the future if needed  -FU in three months"    In the past when she had these symptoms, she did do physical therapy for 3 or 4 sessions and it helped quite a bit and then she stopped.  She is willing to consider repeating this and even considering the healthy back program as well.    Patient Active Problem List:     Recurrent major depressive disorder, in partial remission     Sleep apnea: needs CPAP 10 per sleep study 6/14- declines tx; mild per HST 5/21     IFG (impaired fasting glucose)     Vitamin D deficiency disease     Fatty liver: see u/s 3/18: fibroscan 12/18 also 2020 F2S3     Renal cyst, left: see u/s 3/18     Pelvic floor dysfunction     Mixed hyperlipidemia     Decreased range of motion of trunk and back     Weakness of trunk musculature     Chronic midline low back pain without sciatica     Severe obesity (BMI 35.0-35.9 with comorbidity)          She did therapy at least 3-4 times and adhered to that back book and this helped but sx severe again now.    Today was especially bad and it took her almost 2 hours to get out of the house; had to use ice and heat.      Taking ibuprofen 600 mg every 6 hours.              Review of Systems   Constitutional: Negative " for activity change, appetite change, chills, fatigue and fever.   HENT: Negative for congestion, hearing loss, sinus pressure and sore throat.    Eyes: Negative for visual disturbance.   Respiratory: Negative for apnea, cough, shortness of breath and wheezing.    Cardiovascular: Negative for chest pain, palpitations and leg swelling.   Gastrointestinal: Negative for abdominal distention, abdominal pain, constipation, diarrhea, nausea and vomiting.   Genitourinary: Negative for dysuria, frequency, hematuria and vaginal bleeding.   Musculoskeletal: Positive for back pain. Negative for gait problem, joint swelling and myalgias.   Skin: Negative for rash.   Neurological: Negative for dizziness, weakness, light-headedness and headaches.   Hematological: Negative for adenopathy. Does not bruise/bleed easily.   Psychiatric/Behavioral: Negative for confusion, hallucinations, sleep disturbance and suicidal ideas.       Objective:      Physical Exam  Vitals and nursing note reviewed.   Constitutional:       Appearance: She is well-developed.   HENT:      Head: Normocephalic and atraumatic.      Right Ear: External ear normal.      Left Ear: External ear normal.      Nose: Nose normal.      Mouth/Throat:      Pharynx: No oropharyngeal exudate.   Eyes:      General: No scleral icterus.     Extraocular Movements: Extraocular movements intact.      Conjunctiva/sclera: Conjunctivae normal.   Neck:      Thyroid: No thyromegaly.      Vascular: No JVD.   Cardiovascular:      Rate and Rhythm: Normal rate and regular rhythm.      Heart sounds: Normal heart sounds. No murmur heard.    No gallop.   Pulmonary:      Effort: Pulmonary effort is normal. No respiratory distress.      Breath sounds: Normal breath sounds. No wheezing.   Abdominal:      General: Bowel sounds are normal. There is no distension.      Palpations: Abdomen is soft. There is no mass.      Tenderness: There is no abdominal tenderness. There is no guarding or rebound.    Musculoskeletal:         General: No tenderness. Normal range of motion.      Cervical back: Normal range of motion and neck supple.      Comments: No CVA pain.  Negative SLR.  Strength UE and LE wnl.  No pain over spine   Lymphadenopathy:      Cervical: No cervical adenopathy.   Skin:     General: Skin is warm and dry.      Findings: No erythema or rash.   Neurological:      General: No focal deficit present.      Mental Status: She is alert and oriented to person, place, and time.      Cranial Nerves: No cranial nerve deficit.      Coordination: Coordination normal.      Deep Tendon Reflexes: Reflexes normal.   Psychiatric:         Behavior: Behavior normal.         Thought Content: Thought content normal.         Judgment: Judgment normal.         Assessment:       1. Annual physical exam    2. Recurrent major depressive disorder, in partial remission    3. Dorsalgia, unspecified    4. Severe obesity (BMI 35.0-35.9 with comorbidity)    5. Chronic midline low back pain without sciatica    6. Serrated polyp of colon: 4/22 per CRS 7 year follow up        Plan:         Caryn was seen today for annual exam.    Diagnoses and all orders for this visit:    Annual physical exam    Recurrent major depressive disorder, in partial remission  -     DULoxetine (CYMBALTA) 60 MG capsule; AS NEED IT    Dorsalgia, unspecified  -     X-Ray Lumbar Spine Ap And Lateral  -     X-Ray Sacroiliac Joints Complete; Future    Severe obesity (BMI 35.0-35.9 with comorbidity):  To work on portion control, slow steady weight loss and exercise    Chronic midline low back pain without sciatica; today on exam, no abnormalities.  Natural history reviewed.  Avoid prolonged sitting.  Ice alternating with heat.  Low-dose anti-inflammatories, muscle relaxant.  Gentle stretches, physical therapy.  Alarm symptoms and red flags reviewed  -     Ambulatory referral/consult to [facility]; Future    Serrated polyp of colon: 4/22 per CRS 7 year  follow up    Other orders  -     methylPREDNISolone (MEDROL, DARWIN,) 4 mg tablet; use as directed  -     methocarbamoL (ROBAXIN) 500 MG Tab; Take 1 tablet (500 mg total) by mouth 4 (four) times daily. for 10 days    Labs and review

## 2022-07-11 ENCOUNTER — HOSPITAL ENCOUNTER (EMERGENCY)
Facility: HOSPITAL | Age: 52
Discharge: HOME | End: 2022-07-11
Attending: EMERGENCY MEDICINE
Payer: COMMERCIAL

## 2022-07-11 VITALS
RESPIRATION RATE: 18 BRPM | BODY MASS INDEX: 36.51 KG/M2 | TEMPERATURE: 98 F | DIASTOLIC BLOOD PRESSURE: 66 MMHG | SYSTOLIC BLOOD PRESSURE: 131 MMHG | HEIGHT: 70 IN | WEIGHT: 255 LBS | HEART RATE: 76 BPM | OXYGEN SATURATION: 98 %

## 2022-07-11 DIAGNOSIS — R32 URINARY INCONTINENCE, UNSPECIFIED TYPE: Primary | ICD-10-CM

## 2022-07-11 DIAGNOSIS — M54.16 LUMBAR BACK PAIN WITH RADICULOPATHY AFFECTING RIGHT LOWER EXTREMITY: ICD-10-CM

## 2022-07-11 LAB
BILIRUB UR QL STRIP: NEGATIVE
CLARITY UR: CLEAR
COLOR UR: YELLOW
GLUCOSE UR QL STRIP: NEGATIVE
HGB UR QL STRIP: NEGATIVE
KETONES UR QL STRIP: NEGATIVE
LEUKOCYTE ESTERASE UR QL STRIP: NEGATIVE
NITRITE UR QL STRIP: NEGATIVE
PH UR STRIP: 6 [PH] (ref 5–8)
PROT UR QL STRIP: ABNORMAL
SP GR UR STRIP: >1.03 (ref 1–1.03)
URN SPEC COLLECT METH UR: ABNORMAL
UROBILINOGEN UR STRIP-ACNC: ABNORMAL EU/DL

## 2022-07-11 PROCEDURE — 81003 URINALYSIS AUTO W/O SCOPE: CPT | Performed by: EMERGENCY MEDICINE

## 2022-07-11 PROCEDURE — 99284 EMERGENCY DEPT VISIT MOD MDM: CPT | Mod: 25

## 2022-07-11 RX ORDER — DIAZEPAM 10 MG/2ML
10 INJECTION INTRAMUSCULAR
Status: DISCONTINUED | OUTPATIENT
Start: 2022-07-11 | End: 2022-07-11 | Stop reason: HOSPADM

## 2022-07-11 NOTE — ED TRIAGE NOTES
Presents with urinary incontinence after back injury 5 weeks.  Pt states has had continuing back pain since injury. Pt saw PCP and was placed on muscle relaxant and steroids. Pt states urine  leakage has increased over past 3 days, when she spoke to her PCP she was urged to come to ED immediately.Pt denies numbness or tingling in perineal area or legs.

## 2022-07-11 NOTE — ED PROVIDER NOTES
Encounter Date: 2022       History     Chief Complaint   Patient presents with    Urinary Incontinence     Pt reports was doing yard work 5 weeks ago and pulled her back. Pt reports ever since back pain has been having urinary incontinency. Pt instructed to ER for evaluation      HPI   This 52-year-old white female presents to the emergency room complaining of midline lumbar back pain after doing yd work and lifting heavy papers 5 weeks ago.  The patient has continued pain in her low lumbar back with radiation of the right gluteal region down the right leg.  The lumbar back pain is right-sided.  The patient denies midline symptoms.  Patient has had a little bit of urinary incontinence over the course the last several days.  She denies lower extremity neurologic deficits.  She has no numbness of her labia or bottom.  Review of patient's allergies indicates:   Allergen Reactions    Latex, natural rubber      Past Medical History:   Diagnosis Date    Depression     Fatty liver: see u/s 3/18 3/21/2018    IFG (impaired fasting glucose) 3/18/2016    Obesity     Renal cyst, left: see u/s 3/18 3/21/2018    Sleep apnea 2014    Vitamin D deficiency disease 3/18/2016     Past Surgical History:   Procedure Laterality Date     SECTION      COLONOSCOPY N/A 2018    Procedure: COLONOSCOPY;  Surgeon: Milton Park MD;  Location: [address]);  Service: Endoscopy;  Laterality: N/A;    COLONOSCOPY N/A 3/24/2022    Procedure: COLONOSCOPY;  Surgeon: Doe Rebolledo MD;  Location: [address]);  Service: Endoscopy;  Laterality: N/A;  Covid test on 3/21 at [place].[facility].Pt[facility] Fully Vaccinated.[facility]    HYSTERECTOMY      ovaries remain    LAPAROSCOPIC HYSTERECTOMY  2009    [facility], for uterine fibroids    TUBAL LIGATION       Family History   Problem Relation Age of Onset    Hypertension Mother     Diabetes Father     Kidney disease Father         Dialysis    Hypertension Father      Cancer Maternal Grandmother         breast    Breast cancer Maternal Grandmother     No Known Problems Daughter     No Known Problems Daughter     Breast cancer Unknown     Colon cancer Neg Hx     Ovarian cancer Neg Hx     Melanoma Neg Hx     Psoriasis Neg Hx     Lupus Neg Hx     Cirrhosis Neg Hx      Social History     Tobacco Use    Smoking status: Never Smoker    Smokeless tobacco: Never Used   Substance Use Topics    Alcohol use: Yes     Comment: rarely    Drug use: No     Review of Systems  The patient was questioned specifically with regard to the following.  General: Fever, chills, sweats. Neuro: Headache. Eyes: eye problems. ENT: Ear pain, sore throat. Cardiovascular: Chest pain. Respiratory: Cough, shortness of breath. Gastrointestinal: Abdominal pain, vomiting, diarrhea. Genitourinary: Painful urination.  Musculoskeletal: Arm and leg problems. Skin: Rash.  The review of systems was negative except for the following:  Right-sided lumbar back pain right gluteal pain pain radiating down the right lower extremity.  Urinary incontinence  Physical Exam     Initial Vitals [07/11/22 1207]   BP Pulse Resp Temp SpO2   (!) 155/95 76 18 98 °F (36.7 °C) 97 %      MAP       --         Physical Exam  The patient was examined specifically for the following:   General:No significant distress, Good color, Warm and dry. Head and neck:Scalp atraumatic, Neck supple. Neurological:Appropriate conversation, Gross motor deficits. Eyes:Conjugate gaze, Clear corneas. ENT: No epistaxis. Cardiac: Regular rate and rhythm, Grossly normal heart tones. Pulmonary: Wheezing, Rales. Gastrointestinal: Abdominal tenderness, Abdominal distention. Musculoskeletal: Extremity deformity, Apparent pain with range of motion of the joints. Skin: Rash.   The findings on examination were normal except for the following:  Patient has marked tenderness of the right lateral lumbar back the right superior gluteal region.  Lower extremity  neurologic examination is normal.  Lungs are clear.  The heart tones are normal.  The abdomen is soft.  ED Course   Procedures  Labs Reviewed   URINALYSIS, REFLEX TO URINE CULTURE - Abnormal; Notable for the following components:       Result Value    Specific Gravity, UA >1.030 (*)     Protein, UA Trace (*)     Urobilinogen, UA 2.0-3.0 (*)     All other components within normal limits    Narrative:     Specimen Source->Urine          Imaging Results          MRI Lumbar Spine Without Contrast (Final result)  Result time 07/11/22 15:46:00    Final result by Lee Berg MD (07/11/22 15:46:00)                 Impression:      No evidence of acute fracture or traumatic listhesis of the lumbar spine.    No cauda equina type lesion in the lumbar spine.    Stable appearance of degenerative changes at the L4-5 and L5-S1 levels with no significant spinal canal or neural foraminal narrowing.    Stable appearance of advanced facet disease at the L4-5 and L5-S1 levels.      Electronically signed by: Lee Berg MD  Date:    07/11/2022  Time:    15:46             Narrative:    EXAMINATION:  MRI LUMBAR SPINE WITHOUT CONTRAST    CLINICAL HISTORY:  Low back pain, cauda equina syndrome suspected;    TECHNIQUE:  Multiplanar, multisequence MR images were acquired from the thoracolumbar junction to the sacrum without the administration of contrast.    COMPARISON:  X-ray of the lumbar spine dated 07/07/2022    MRI of the lumbar spine dated 12/18/2019.    FINDINGS:  The lumbar alignment is within normal limits.  There are 5 lumbar type vertebral body.  The vertebral body heights are maintained.    The conus terminates at the level of L1.  The cauda equina nerve roots are within normal limits.    There is stable appearance of disc desiccation at the L4-5 and L5-S1 levels.  There is stable appearance of the advanced endplate changes at the L5-S1 level.  Evaluation of the individual disc levels reveals the following:    L1-L2,  normal.    L2-L3, normal    L3-L4, there is facet hypertrophy and ligamentum flavum hypertrophy.  The spinal canal and neural foramina are unremarkable.    L4-5, there is diffuse disc bulge along with facet hypertrophy and ligamentum flavum hypertrophy.  There is small amount of fluid within the facet joints.  The spinal canal is within normal limits.  The neural foramina is unremarkable.  There is mild narrowing the lateral recesses.    L5-S1, there is diffuse disc bulge along with facet hypertrophy and ligamentum flavum hypertrophy.  There is unchanged appearance of central disc protrusion.  The spinal canal is within normal limits.  There is mild bilateral neural foraminal narrowing.  There is mild bilateral narrowing the lateral recesses.    There is a stable simple cyst in the left kidney.  The remainder of the retroperitoneal structures are within normal limits.                              Medical decision making:  Given the above this patient presents to the emergency room with lumbar back pain and urinary incontinence.  MRI of the back fails to reveal cauda equina syndrome.  I will discharge this patient to follow up with Urology and spinal surgery.  I will have the patient return if she gets worse or if new problems develop.  I will refer the patient back primary care to manage the lumbar back pain.  Postvoid residual bladder scan is 0. There is no evidence of urinary tract infection.          Medications   diazePAM injection 10 mg (10 mg Intravenous Not Given 7/11/22 1415)                          Clinical Impression:   Final diagnoses:  [R32] Urinary incontinence, unspecified type (Primary)  [M54.16] Lumbar back pain with radiculopathy affecting right lower extremity          ED Disposition Condition    Discharge Stable        ED Prescriptions     None        Follow-up Information     Follow up With Specialties Details Why Contact Info    Layla Laws MD Internal Medicine In 1 week  [address]  [facility] [address]  [phone]      BRADLEY Hess MD Urology In 1 week  [address]  [address]  [facility] [address]  [phone]             Александр Miramontes MD  07/11/22 [id number]

## 2022-07-11 NOTE — TELEPHONE ENCOUNTER
Please tell her she needs to go to the emergency room ASAP, any ER, this is a medical emergency if she is having bladder and bowel incontinence    This could be a serious surgical emergency for her back

## 2022-07-11 NOTE — DISCHARGE INSTRUCTIONS
Please return immediately if you get worse or if new problems develop.  Please follow-up with your primary care doctor this week.  Please follow-up with the urologist above about your incontinence.  Please continue your steroids as directed.

## 2022-07-12 ENCOUNTER — LAB VISIT (OUTPATIENT)
Dept: LAB | Facility: HOSPITAL | Age: 52
End: 2022-07-12
Attending: INTERNAL MEDICINE
Payer: COMMERCIAL

## 2022-07-12 ENCOUNTER — PATIENT MESSAGE (OUTPATIENT)
Dept: INTERNAL MEDICINE | Facility: CLINIC | Age: 52
End: 2022-07-12
Payer: COMMERCIAL

## 2022-07-12 ENCOUNTER — CLINICAL SUPPORT (OUTPATIENT)
Dept: REHABILITATION | Facility: HOSPITAL | Age: 52
End: 2022-07-12
Payer: COMMERCIAL

## 2022-07-12 DIAGNOSIS — R32 URINARY INCONTINENCE, UNSPECIFIED TYPE: Primary | ICD-10-CM

## 2022-07-12 DIAGNOSIS — Z00.00 ANNUAL PHYSICAL EXAM: ICD-10-CM

## 2022-07-12 DIAGNOSIS — M54.9 BILATERAL BACK PAIN, UNSPECIFIED BACK LOCATION, UNSPECIFIED CHRONICITY: ICD-10-CM

## 2022-07-12 DIAGNOSIS — Z74.09 IMPAIRED FUNCTIONAL MOBILITY AND ACTIVITY TOLERANCE: ICD-10-CM

## 2022-07-12 LAB
25(OH)D3+25(OH)D2 SERPL-MCNC: 21 NG/ML (ref 30–96)
ALBUMIN SERPL BCP-MCNC: 3.7 G/DL (ref 3.5–5.2)
ALP SERPL-CCNC: 78 U/L (ref 55–135)
ALT SERPL W/O P-5'-P-CCNC: 20 U/L (ref 10–44)
ANION GAP SERPL CALC-SCNC: 9 MMOL/L (ref 8–16)
AST SERPL-CCNC: 19 U/L (ref 10–40)
BASOPHILS # BLD AUTO: 0.05 K/UL (ref 0–0.2)
BASOPHILS NFR BLD: 0.7 % (ref 0–1.9)
BILIRUB SERPL-MCNC: 0.6 MG/DL (ref 0.1–1)
BUN SERPL-MCNC: 17 MG/DL (ref 6–20)
CALCIUM SERPL-MCNC: 9.6 MG/DL (ref 8.7–10.5)
CHLORIDE SERPL-SCNC: 103 MMOL/L (ref 95–110)
CHOLEST SERPL-MCNC: 217 MG/DL (ref 120–199)
CHOLEST/HDLC SERPL: 2.7 {RATIO} (ref 2–5)
CO2 SERPL-SCNC: 23 MMOL/L (ref 23–29)
CREAT SERPL-MCNC: 0.9 MG/DL (ref 0.5–1.4)
DIFFERENTIAL METHOD: ABNORMAL
EOSINOPHIL # BLD AUTO: 0.1 K/UL (ref 0–0.5)
EOSINOPHIL NFR BLD: 1.2 % (ref 0–8)
ERYTHROCYTE [DISTWIDTH] IN BLOOD BY AUTOMATED COUNT: 12.5 % (ref 11.5–14.5)
EST. GFR  (AFRICAN AMERICAN): >60 ML/MIN/1.73 M^2
EST. GFR  (NON AFRICAN AMERICAN): >60 ML/MIN/1.73 M^2
ESTIMATED AVG GLUCOSE: 131 MG/DL (ref 68–131)
GLUCOSE SERPL-MCNC: 94 MG/DL (ref 70–110)
HBA1C MFR BLD: 6.2 % (ref 4–5.6)
HCT VFR BLD AUTO: 40.9 % (ref 37–48.5)
HDLC SERPL-MCNC: 79 MG/DL (ref 40–75)
HDLC SERPL: 36.4 % (ref 20–50)
HGB BLD-MCNC: 13.4 G/DL (ref 12–16)
IMM GRANULOCYTES # BLD AUTO: 0.06 K/UL (ref 0–0.04)
IMM GRANULOCYTES NFR BLD AUTO: 0.8 % (ref 0–0.5)
LDLC SERPL CALC-MCNC: 129 MG/DL (ref 63–159)
LYMPHOCYTES # BLD AUTO: 2.4 K/UL (ref 1–4.8)
LYMPHOCYTES NFR BLD: 31.6 % (ref 18–48)
MCH RBC QN AUTO: 30.2 PG (ref 27–31)
MCHC RBC AUTO-ENTMCNC: 32.8 G/DL (ref 32–36)
MCV RBC AUTO: 92 FL (ref 82–98)
MONOCYTES # BLD AUTO: 0.4 K/UL (ref 0.3–1)
MONOCYTES NFR BLD: 5.6 % (ref 4–15)
NEUTROPHILS # BLD AUTO: 4.5 K/UL (ref 1.8–7.7)
NEUTROPHILS NFR BLD: 60.1 % (ref 38–73)
NONHDLC SERPL-MCNC: 138 MG/DL
NRBC BLD-RTO: 0 /100 WBC
PLATELET # BLD AUTO: 302 K/UL (ref 150–450)
PMV BLD AUTO: 9.4 FL (ref 9.2–12.9)
POTASSIUM SERPL-SCNC: 3.7 MMOL/L (ref 3.5–5.1)
PROT SERPL-MCNC: 6.8 G/DL (ref 6–8.4)
RBC # BLD AUTO: 4.43 M/UL (ref 4–5.4)
SODIUM SERPL-SCNC: 135 MMOL/L (ref 136–145)
TRIGL SERPL-MCNC: 45 MG/DL (ref 30–150)
TSH SERPL DL<=0.005 MIU/L-ACNC: 2.06 UIU/ML (ref 0.4–4)
WBC # BLD AUTO: 7.54 K/UL (ref 3.9–12.7)

## 2022-07-12 PROCEDURE — 82306 VITAMIN D 25 HYDROXY: CPT | Performed by: INTERNAL MEDICINE

## 2022-07-12 PROCEDURE — 84443 ASSAY THYROID STIM HORMONE: CPT | Performed by: INTERNAL MEDICINE

## 2022-07-12 PROCEDURE — 83036 HEMOGLOBIN GLYCOSYLATED A1C: CPT | Performed by: INTERNAL MEDICINE

## 2022-07-12 PROCEDURE — 36415 COLL VENOUS BLD VENIPUNCTURE: CPT | Performed by: INTERNAL MEDICINE

## 2022-07-12 PROCEDURE — 97161 PT EVAL LOW COMPLEX 20 MIN: CPT

## 2022-07-12 PROCEDURE — 80061 LIPID PANEL: CPT | Performed by: INTERNAL MEDICINE

## 2022-07-12 PROCEDURE — 85025 COMPLETE CBC W/AUTO DIFF WBC: CPT | Performed by: INTERNAL MEDICINE

## 2022-07-12 PROCEDURE — 80053 COMPREHEN METABOLIC PANEL: CPT | Performed by: INTERNAL MEDICINE

## 2022-07-13 PROBLEM — Z74.09 IMPAIRED FUNCTIONAL MOBILITY AND ACTIVITY TOLERANCE: Status: ACTIVE | Noted: 2022-07-13

## 2022-07-13 NOTE — PROGRESS NOTES
Subjective:       Caryn Zarate is a 52 y.o. female who is a new patient who was referred by Dr. Layla Laws  for evaluation of UI.      She was seen in ER 7/11/22 with new onset UI. She noted straining her back approx 5 weeks prior to that. She then noted mild UI about 3 days prior to ER visit. No neurologic deficits. MRI without cauda equina. PVR 0cc at that time. UA essentially clear.      She reports UI - mainly COURTNEY, with cough, sneeze. She has noted that this has improved recently. Wearing pads but staying dry. Denies urgency. Nocturia x 0.  Denies d/c.     She has recently started PT for her back and they have been working on her pelvic floor as well.     PVR (bladder scan) today - 0cc      The following portions of the patient's history were reviewed and updated as appropriate: allergies, current medications, past family history, past medical history, past social history, past surgical history and problem list.    Review of Systems  12 point review of systems completed. Pertinent positive and negatives listed in HPI        Objective:    Vitals: /66   Pulse 87   Wt 116.6 kg (257 lb)   LMP  (LMP Unknown)   BMI 36.88 kg/m²     Physical Exam   General: well developed, well nourished in no acute distress  Head: normocephalic, atraumatic  Neck: supple, trachea midline, no obvious enlargement of thyroid  HEENT: EOMI, mucus membranes moist, sclera anicteric, no hearing impairment  Lungs: symmetric expansion, non-labored breathing  Skin: no rashes or lesions  Neuro: alert and oriented x 3, no gross deficits  Psych: normal judgment and insight, normal mood/affect and non-anxious  Genitourinary: deferred      Lab Review   Urine analysis today in clinic shows positive for protein trace    Lab Results   Component Value Date    WBC 7.54 07/12/2022    HGB 13.4 07/12/2022    HCT 40.9 07/12/2022    MCV 92 07/12/2022     07/12/2022     Lab Results   Component Value Date    CREATININE 0.9 07/12/2022    BUN  17 07/12/2022       Imaging  NA           Assessment/Plan:      1. Stress incontinence    - Suspect PT has helped improve her COURTNEY with pelvic floor exercises   - Discussed Kegels exercises, handout given   - Symptoms have improved. Avoid procedures/surgery at this time.         Follow up PRN

## 2022-07-13 NOTE — PLAN OF CARE
[facility]   Physical Therapy Initial Evaluation     Date: 7/12/2022   Name: Caryn Zarate  [facility] Number: [id number]    Therapy Diagnosis:   Encounter Diagnoses   Name Primary?    Bilateral back pain, unspecified back location, unspecified chronicity     Impaired functional mobility and activity tolerance        Physician: Layla Laws MD    Physician Orders: PT Eval and Treat   Medical Diagnosis from Referral: M54.50,G89.29 (ICD-10-CM) - Chronic midline low back pain without sciatica  Evaluation Date: 7/12/2022  Authorization Period Expiration: 6/30/2023  Plan of Care Expiration: 9/23/2022  Progress Note Due: 10th visit  Visit # / Visits authorized: 1/ 1   FOTO: 1/3    Precautions: Standard     Time In: 10:10 am  Time Out: 10:59 am  Total Appointment Time (timed & untimed codes): 49 minutes      SUBJECTIVE     Date of onset: 5 weeks ago    History of current condition - Caryn reports: she injured her back 5 weeks ago after lifting and twisting to move concrete pavers. Patient reports she recently went to the ER for back pain and urinary incontinence. Patient denies saddle anesthesia and was cleared for cauda equina in ED. Patient reports increased pain after laying supine and then trying to stand up. Patient reports double knee to chest makes her feel better and allows her to transition to standing position.   Patient reports pain is located on right side of lumbar spine and Right buttock pain that goes to posterior R mid thigh, however never past or to her knee.  Patient reports she typically walks 3 miles a day for exercise, however unable to tolerate at this time. Patient reports when she laughs or coughs she releases urine, however notes she can hold her urine until she reaches the bathroom. Patient reports she has had pelvic floor PT in the past.     Falls: denies any falls    Imaging, please see imaging    Prior Therapy: pelvic floor PT  Social History: lives with their family,  1 story home, 1 threshold step to enter  Occupation: [employer] and   Prior Level of Function: independent with all household and personal ADL's  Current Level of Function: difficulty getting dressed, pain with ambulation, unable to lift items, pain and difficulty with transitions and transfers, pain with bed mobility    Pain:  Current 6/10, worst 10/10, best 6/10   Location: right low back and buttock    Description: Dull  Aggravating Factors: Sitting, Standing, Getting out of bed/chair and bed mobility  Easing Factors: ice and oral steroids, muscle relaxer     Patients goals: decrease pain, return to PLOF     Medical History:   Past Medical History:   Diagnosis Date    Depression     Fatty liver: see u/s 3/18 3/21/2018    IFG (impaired fasting glucose) 3/18/2016    Obesity     Renal cyst, left: see u/s 3/18 3/21/2018    Sleep apnea 2014    Vitamin D deficiency disease 3/18/2016       Surgical History:   Caryn Zarate  has a past surgical history that includes Laparoscopic hysterectomy (2009); Tubal ligation; Hysterectomy;  section; Colonoscopy (N/A, 2018); and Colonoscopy (N/A, 3/24/2022).    Medications:   Caryn has a current medication list which includes the following prescription(s): duloxetine, methocarbamol, methylprednisolone, multivitamin, and polyethylene glycol.    Allergies:   Review of patient's allergies indicates:   Allergen Reactions    Latex, natural rubber           OBJECTIVE     Observation: Pt ambulates with shortened step length. Patient demonstrates forward trunk lean and anterior tilt of pelvis. Patient demonstrates increased lumbar curvature.    Posture: forward head and rounded shoulders     Functional Movement: lumbar multifidi mm engagement: poor motor control with seated weight shift/seated hip flexion   Toe walking: low back pain   Heel walking: no pain    Lumbar Range of Motion:     Limitation Pain   Flexion 75%        Extension 100% Compensates  with knee flexion      Left Side Bending 25%     Right Side Bending 100%  p!      Balance Assessment:       Evaluation   Single Limb Stance R LE  >10 seconds  (<10 sec = HIGH FALL RISK)   Single Limb Stance L LE >10 seconds   (<10 sec = HIGH FALL RISK)        Lower Extremity Strength  Right LE   Left LE     Hip Ext 4-/5 Hip Ext 4-/5   Hip ABD 4-/5 Hip ABD  4-/5   Hip IR 4-/5 Hip IR 4-/5   Hip ER 4-/5 Hip IR 4-/5   Hip FLEX 4/5 Hip FLEX 4/5 p! On R side   Knee FLEX 5/5 Knee FLEX 5/5   Knee EXT 5/5 Knee EXT 5/5   Plantar flexion (seated) 5/5 Plantar flexion (seated) 5/5   Dorsiflexion 5/5 Dorsiflexion 5/5      Neural Tension Testing:  SLR: L (-); R (-)  Slump Test: L (-); R (-)     Sensation: bilateral lower extremity dermatomes intact     Reflexes:   L3/4: R 2+;L2+  S1/2: R 2+;L 2+     Red flag screen:               B/B changes: PCP appointment in 7/19/2022              Clonus: 0 beats              Babinski: (-) bilateral    Range of Motion:  R Hip Active(Passive) L Hip Active (Passive)   Flexion WNL flexion WNL   Extension WNL extension WNL   IR WNL IR WNL   ER < 30 ER < 30       Functional Mobility Assessment:  Double leg Squat: improved with increased repitions    Special Tests:  FADIR: R (-)               L: (-)  MAEGAN: R (-)               L: (-)  Scours: R: (-)               L (-)       Limitation/Restriction for FOTO Lumbar Survey    Therapist reviewed FOTO scores for Caryn Zarate on 7/12/2022.   FOTO documents entered into [employer] - see Media section.    Limitation Score: 53%         TREATMENT     Total Treatment time (time-based codes) separate from Evaluation: 00 minutes      Caryn received the treatments listed below:        PATIENT EDUCATION AND HOME EXERCISES     Education provided:   - HEP reviewed and demonstrated; patient verbalized undertsanidng    Written Home Exercises Provided: Patient instructed to cont prior HEP. Exercises were reviewed and Caryn was able to demonstrate them prior to the end of the  session.  Caryn demonstrated good  understanding of the education provided. See EMR under Patient Instructions for exercises provided during therapy sessions.    ASSESSMENT     Caryn is a 52 y.o. female referred to outpatient Physical Therapy with a medical diagnosis of Chronic midline low back pain without sciatica. Patient presents with s/s consistent with poor motor control of lumbar paraspinals with pain distribution present along right lumbar paraspinals and right buttock. Increased tone of right lumbar paraspinals. Pt presents with poor core stability and motor control. Pt demonstrates reduced lumbar ROM secondary to pain and poor lumbar functional mobility. Pt responded well to posterior pelvic tilt, and gentle IR and ER hip stretching. Pt would benefit from flexion category of treatment based classification, and lifting techniques to decreased pain and prevent future injury.    Patient prognosis is Good.   Patient will benefit from skilled outpatient Physical Therapy to address the deficits stated above and in the chart below, provide patient /family education, and to maximize patientt's level of independence.     Plan of care discussed with patient: Yes  Patient's spiritual, cultural and educational needs considered and patient is agreeable to the plan of care and goals as stated below:     Anticipated Barriers for therapy: none    Medical Necessity is demonstrated by the following  History  Co-morbidities and personal factors that may impact the plan of care Co-morbidities:   hysterectomy     Personal Factors:   no deficits     low   Examination  Body Structures and Functions, activity limitations and participation restrictions that may impact the plan of care Body Regions:   lower extremities  trunk    Body Systems:    gross symmetry  ROM  strength  gait  transfers  motor control    Participation Restrictions:   none    Activity limitations:   Learning and applying knowledge  no deficits    General Tasks  "and Commands  no deficits    Communication  no deficits    Mobility  lifting and carrying objects  walking  driving (bike, car, motorcycle)    Self care  dressing    Domestic Life  doing house work (cleaning house, washing dishes, laundry)    Interactions/Relationships  no deficits    Life Areas  no deficits    Community and Social Life  no deficits         moderate   Clinical Presentation stable and uncomplicated low   Decision Making/ Complexity Score: low     Goals:  Short Term Goals: 5 weeks  1. Pt will initiate home exercise program to improve strength, flexibility, and functional mobility to return to OF.   2. Pt will report decreased right sided lower back pain  < / =  2/10  to demonstrate improved activity tolerance.   3. Pt will demonstrate improved lumbar ROM by 25% in extension and right lateral flexion in order to perform ADLs without difficulty.   4. Patient will perform 3 consecutive hip hinges with less than 3 VC to demonstrate improved lumbopelvic movement for safety during household ADL's and improved lifting techniques.     Long Term Goals: 10 weeks   1. Pt will tolerate sitting and standing for 20 minutes or more to demonstrate improved standing tolerance in order to meet workplace demands.   2. Patient will perform 3 consecutive hip hinges with less than 0 VC to demonstrate improved lumbopelvic movement for safety during household ADL's and improved lifting techniques.  3. Pt will report at least one instance of decreased pain with transfers when implementing proper form learned in therapy to show increased independence with functional mobility.   4. Patient will perform 12-15 sit to stands in 30 seconds, using "nose over toes" method, without UE support to demonstrate increased functional strength and lower extremity endurance.   5. Patient will initiate walking program to demonstrate self management of current condition.       PLAN   Plan of care Certification: 7/12/2022 to " 9/23/2022.    Outpatient Physical Therapy 1-2 times weekly for 10 weeks to include the following interventions: Aquatic Therapy, Cervical/Lumbar Traction, Electrical Stimulation TENS, Gait Training, Manual Therapy, Moist Heat/ Ice, Neuromuscular Re-ed, Orthotic Management and Training, Patient Education, Self Care, Therapeutic Activities and Therapeutic Exercise.     Francy Padgett, PT      I CERTIFY THE NEED FOR THESE SERVICES FURNISHED UNDER THIS PLAN OF TREATMENT AND WHILE UNDER MY CARE   Physician's comments:     Physician's Signature: ___________________________________________________

## 2022-07-14 ENCOUNTER — CLINICAL SUPPORT (OUTPATIENT)
Dept: REHABILITATION | Facility: HOSPITAL | Age: 52
End: 2022-07-14
Payer: COMMERCIAL

## 2022-07-14 DIAGNOSIS — Z74.09 IMPAIRED FUNCTIONAL MOBILITY AND ACTIVITY TOLERANCE: Primary | ICD-10-CM

## 2022-07-14 PROCEDURE — 97110 THERAPEUTIC EXERCISES: CPT | Mod: CQ

## 2022-07-14 NOTE — PROGRESS NOTES
[facility]   Physical Therapy Treatment Note     Name: Caryn [facility] Number: [id number]    Therapy Diagnosis:   Encounter Diagnosis   Name Primary?    Impaired functional mobility and activity tolerance Yes     Physician: Layla Laws MD    Visit Date: 7/14/2022    Physician Orders: PT Eval and Treat   Medical Diagnosis from Referral: M54.50,G89.29 (ICD-10-CM) - Chronic midline low back pain without sciatica  Evaluation Date: 7/12/2022  Authorization Period Expiration: 12/31/2022  Plan of Care Expiration: 9/23/2022  Progress Note Due: 10th visit  Visit # / Visits authorized: 1 / 20 + eval    PTA Visit #: 1 / 5     Time In: 0800  Time Out: 0905  Total Treatment Time: 65 minutes  Total Billable Time: 65 minutes (4 TE)    Precautions: Standard     SUBJECTIVE     Patient reports: that her Right sciatica is hurting her this morning. Notes that the pain goes into the back of Right buttock.  She was compliant with home exercise program.  Response to previous treatment: good; appropriate muscle soreness  Functional change: difficulty standing to brush her teeth; needs to use furniture to walk around her home    Pain: 4/10, currently  Location: Right lumbar spine, buttock region    Patient goals: decrease pain, return to PLOF    OBJECTIVE     Objective Measures updated at progress report unless specified.     Treatment     Caryn received the treatments listed below:      THERAPEUTIC EXERCISES to develop strength, endurance, ROM, flexibility, posture and core stabilization for 65 minutes including:    +Aerobic Activity to help improve mobility and tissue extensibility; Nustep for 8 minutes, Level 3 - reported improvement in intensity of pain in Right piriformis  DKTC; 10 second, 2 x 10 reps  SKTC; 10 second hold, 2 x 10 reps on each LE  Piriformis stretch ER/IR; 10 second hold, 10 reps each direction  Pelvic tilts; 10 second hold, 3 x 10 reps  +Pelvic tilt + glute squeeze; 5 second hold, 2  x 10 reps  +Pelvic tilt + ball squeeze; 5 second hold, 2 x 10 reps  +Pelvic tilt + hip fall out; Yellow PB band, 2 x 10 reps alternating  +Pelvic tilt + bridge; Yellow PB band, 2 x 10 reps    Patient Education and Home Exercises     Home Exercises Provided and Patient Education Provided     Education provided:   - Continued compliance with HEP    Written Home Exercises Provided: Patient instructed to cont prior HEP. Exercises were reviewed and Caryn was able to demonstrate them prior to the end of the session.  Caryn demonstrated good  understanding of the education provided. See EMR under Patient Instructions for exercises provided during therapy sessions    ASSESSMENT     Ms. Rubin presents to clinic for her first follow up after initial evaluation. She had good tolerance to exercises performed noting appropriate muscle response and a decrease in her pain level to 0 out of 10. Patient noted improvement in gait post session. Mod cueing required to remain core activation with dual activity.     Caryn Is progressing well towards her goals.   Pt prognosis is Good.     Pt will continue to benefit from skilled outpatient physical therapy to address the deficits listed in the problem list box on initial evaluation, provide pt/family education and to maximize pt's level of independence in the home and community environment.     Pt's spiritual, cultural and educational needs considered and pt agreeable to plan of care and goals.     Anticipated barriers to physical therapy: none    Goals:   Short Term Goals: 5 weeks  1. Pt will initiate home exercise program to improve strength, flexibility, and functional mobility to return to PLOF.   2. Pt will report decreased right sided lower back pain  < / =  2/10  to demonstrate improved activity tolerance.   3. Pt will demonstrate improved lumbar ROM by 25% in extension and right lateral flexion in order to perform ADLs without difficulty.   4. Patient will perform 3 consecutive hip  "hinges with less than 3 VC to demonstrate improved lumbopelvic movement for safety during household ADL's and improved lifting techniques.     Long Term Goals: 10 weeks   1. Pt will tolerate sitting and standing for 20 minutes or more to demonstrate improved standing tolerance in order to meet workplace demands.   2. Patient will perform 3 consecutive hip hinges with less than 0 VC to demonstrate improved lumbopelvic movement for safety during household ADL's and improved lifting techniques.  3. Pt will report at least one instance of decreased pain with transfers when implementing proper form learned in therapy to show increased independence with functional mobility.   4. Patient will perform 12-15 sit to stands in 30 seconds, using "nose over toes" method, without UE support to demonstrate increased functional strength and lower extremity endurance.   5. Patient will initiate walking program to demonstrate self management of current condition.     PLAN     Emphasize core control.     Shanice Brooks, FINESSE     "

## 2022-07-15 ENCOUNTER — TELEPHONE (OUTPATIENT)
Dept: INTERNAL MEDICINE | Facility: CLINIC | Age: 52
End: 2022-07-15
Payer: COMMERCIAL

## 2022-07-15 DIAGNOSIS — F33.41 RECURRENT MAJOR DEPRESSIVE DISORDER, IN PARTIAL REMISSION: ICD-10-CM

## 2022-07-15 RX ORDER — DULOXETIN HYDROCHLORIDE 60 MG/1
60 CAPSULE, DELAYED RELEASE ORAL DAILY
Qty: 30 CAPSULE | Refills: 3 | Status: SHIPPED | OUTPATIENT
Start: 2022-07-15 | End: 2022-12-07

## 2022-07-15 NOTE — TELEPHONE ENCOUNTER
"Per the pharmacy , prescription cannot be "as needed it" please advise on direction on how many times a day pt should take it, Cymbalta  "

## 2022-07-15 NOTE — TELEPHONE ENCOUNTER
----- Message from Laly Chase sent at 7/15/2022 10:43 AM CDT -----  Contact: [phone]  Betzy is needing clarication for the instructions that was sent over for Deloxapine 60 please gve return call

## 2022-07-19 ENCOUNTER — IMMUNIZATION (OUTPATIENT)
Dept: INTERNAL MEDICINE | Facility: CLINIC | Age: 52
End: 2022-07-19
Payer: COMMERCIAL

## 2022-07-19 ENCOUNTER — CLINICAL SUPPORT (OUTPATIENT)
Dept: REHABILITATION | Facility: HOSPITAL | Age: 52
End: 2022-07-19
Payer: COMMERCIAL

## 2022-07-19 ENCOUNTER — OFFICE VISIT (OUTPATIENT)
Dept: INTERNAL MEDICINE | Facility: CLINIC | Age: 52
End: 2022-07-19
Payer: COMMERCIAL

## 2022-07-19 ENCOUNTER — PATIENT MESSAGE (OUTPATIENT)
Dept: PHARMACY | Facility: CLINIC | Age: 52
End: 2022-07-19
Payer: COMMERCIAL

## 2022-07-19 VITALS
BODY MASS INDEX: 36.79 KG/M2 | WEIGHT: 257 LBS | SYSTOLIC BLOOD PRESSURE: 120 MMHG | HEIGHT: 70 IN | DIASTOLIC BLOOD PRESSURE: 85 MMHG

## 2022-07-19 DIAGNOSIS — Z23 NEED FOR VACCINATION: Primary | ICD-10-CM

## 2022-07-19 DIAGNOSIS — G89.29 CHRONIC MIDLINE LOW BACK PAIN WITHOUT SCIATICA: Primary | ICD-10-CM

## 2022-07-19 DIAGNOSIS — Z74.09 IMPAIRED FUNCTIONAL MOBILITY AND ACTIVITY TOLERANCE: Primary | ICD-10-CM

## 2022-07-19 DIAGNOSIS — M54.50 CHRONIC MIDLINE LOW BACK PAIN WITHOUT SCIATICA: Primary | ICD-10-CM

## 2022-07-19 PROCEDURE — 3044F HG A1C LEVEL LT 7.0%: CPT | Mod: CPTII,S$GLB,, | Performed by: INTERNAL MEDICINE

## 2022-07-19 PROCEDURE — 3044F PR MOST RECENT HEMOGLOBIN A1C LEVEL <7.0%: ICD-10-PCS | Mod: CPTII,S$GLB,, | Performed by: INTERNAL MEDICINE

## 2022-07-19 PROCEDURE — 3079F PR MOST RECENT DIASTOLIC BLOOD PRESSURE 80-89 MM HG: ICD-10-PCS | Mod: CPTII,S$GLB,, | Performed by: INTERNAL MEDICINE

## 2022-07-19 PROCEDURE — 3074F SYST BP LT 130 MM HG: CPT | Mod: CPTII,S$GLB,, | Performed by: INTERNAL MEDICINE

## 2022-07-19 PROCEDURE — 3074F PR MOST RECENT SYSTOLIC BLOOD PRESSURE < 130 MM HG: ICD-10-PCS | Mod: CPTII,S$GLB,, | Performed by: INTERNAL MEDICINE

## 2022-07-19 PROCEDURE — 3008F BODY MASS INDEX DOCD: CPT | Mod: CPTII,S$GLB,, | Performed by: INTERNAL MEDICINE

## 2022-07-19 PROCEDURE — 97110 THERAPEUTIC EXERCISES: CPT | Mod: CQ

## 2022-07-19 PROCEDURE — 3008F PR BODY MASS INDEX (BMI) DOCUMENTED: ICD-10-PCS | Mod: CPTII,S$GLB,, | Performed by: INTERNAL MEDICINE

## 2022-07-19 PROCEDURE — 99999 PR PBB SHADOW E&M-EST. PATIENT-LVL III: CPT | Mod: PBBFAC,,, | Performed by: INTERNAL MEDICINE

## 2022-07-19 PROCEDURE — 99214 PR OFFICE/OUTPT VISIT, EST, LEVL IV, 30-39 MIN: ICD-10-PCS | Mod: S$GLB,,, | Performed by: INTERNAL MEDICINE

## 2022-07-19 PROCEDURE — 3079F DIAST BP 80-89 MM HG: CPT | Mod: CPTII,S$GLB,, | Performed by: INTERNAL MEDICINE

## 2022-07-19 PROCEDURE — 1160F PR REVIEW ALL MEDS BY PRESCRIBER/CLIN PHARMACIST DOCUMENTED: ICD-10-PCS | Mod: CPTII,S$GLB,, | Performed by: INTERNAL MEDICINE

## 2022-07-19 PROCEDURE — 1159F PR MEDICATION LIST DOCUMENTED IN MEDICAL RECORD: ICD-10-PCS | Mod: CPTII,S$GLB,, | Performed by: INTERNAL MEDICINE

## 2022-07-19 PROCEDURE — 0054A COVID-19, MRNA, LNP-S, PF, 30 MCG/0.3 ML DOSE VACCINE (PFIZER): CPT | Mod: CV19,PBBFAC | Performed by: INTERNAL MEDICINE

## 2022-07-19 PROCEDURE — 1159F MED LIST DOCD IN RCRD: CPT | Mod: CPTII,S$GLB,, | Performed by: INTERNAL MEDICINE

## 2022-07-19 PROCEDURE — 1160F RVW MEDS BY RX/DR IN RCRD: CPT | Mod: CPTII,S$GLB,, | Performed by: INTERNAL MEDICINE

## 2022-07-19 PROCEDURE — 99214 OFFICE O/P EST MOD 30 MIN: CPT | Mod: S$GLB,,, | Performed by: INTERNAL MEDICINE

## 2022-07-19 PROCEDURE — 99999 PR PBB SHADOW E&M-EST. PATIENT-LVL III: ICD-10-PCS | Mod: PBBFAC,,, | Performed by: INTERNAL MEDICINE

## 2022-07-19 RX ORDER — METHOCARBAMOL 500 MG/1
500 TABLET, FILM COATED ORAL 3 TIMES DAILY PRN
Qty: 30 TABLET | Refills: 1 | Status: SHIPPED | OUTPATIENT
Start: 2022-07-19 | End: 2022-07-29

## 2022-07-19 NOTE — PROGRESS NOTES
[facility]   Physical Therapy Treatment Note     Name: Caryn [facility] Number: [id number]    Therapy Diagnosis:   Encounter Diagnosis   Name Primary?    Impaired functional mobility and activity tolerance Yes     Physician: Layla Laws MD    Visit Date: 7/19/2022    Physician Orders: PT Eval and Treat   Medical Diagnosis from Referral: M54.50,G89.29 (ICD-10-CM) - Chronic midline low back pain without sciatica  Evaluation Date: 7/12/2022  Authorization Period Expiration: 12/31/2022  Plan of Care Expiration: 9/23/2022  Progress Note Due: 10th visit  Visit # / Visits authorized: 2 / 20 + eval    PTA Visit #: 2 / 5     Time In: 1300  Time Out: 1355  Total Treatment Time: 55 minutes  Total Billable Time: 55 minutes (4 TE)    Precautions: Standard     SUBJECTIVE     Patient reports: that she felt good after last visit. Has some pain in her low back and into Right buttock.  She was compliant with home exercise program.  Response to previous treatment: good; appropriate muscle soreness  Functional change: difficulty standing to brush her teeth; needs to use furniture to walk around her home    Pain: 4/10, currently  Location: Right lumbar spine, buttock region    Patient goals: decrease pain, return to PLOF    OBJECTIVE     Objective Measures updated at progress report unless specified.     Treatment     Caryn received the treatments listed below:      THERAPEUTIC EXERCISES to develop strength, endurance, ROM, flexibility, posture and core stabilization for 55 minutes including:    Aerobic Activity to help improve mobility and tissue extensibility; Nustep for 10 minutes, Level 3   DKTC; 10 second, 2 x 10 reps  SKTC; 10 second hold, 2 x 10 reps on each LE  Piriformis stretch ER/IR; 10 second hold, 10 reps each direction  Pelvic tilts; 10 second hold, 3 x 10 reps  Pelvic tilt + glute squeeze; 5 second hold, 3 x 10 reps  Pelvic tilt + ball squeeze; 5 second hold, 2 x 10 reps  Pelvic tilt +  hip fall out; Green PB band, 2 x 10 reps alternating  Pelvic tilt + bridge; Green PB band, 3 x 10 reps    Patient Education and Home Exercises     Home Exercises Provided and Patient Education Provided     Education provided:   - Continued compliance with HEP    Written Home Exercises Provided: Patient instructed to cont prior HEP. Exercises were reviewed and Caryn was able to demonstrate them prior to the end of the session.  Caryn demonstrated good  understanding of the education provided. See EMR under Patient Instructions for exercises provided during therapy sessions    ASSESSMENT     Patient with good tolerance to exercises performed noting appropriate muscle response post session. Continue progressing per patient tolerance.   Caryn Is progressing well towards her goals.   Pt prognosis is Good.     Pt will continue to benefit from skilled outpatient physical therapy to address the deficits listed in the problem list box on initial evaluation, provide pt/family education and to maximize pt's level of independence in the home and community environment.     Pt's spiritual, cultural and educational needs considered and pt agreeable to plan of care and goals.     Anticipated barriers to physical therapy: none    Goals:   Short Term Goals: 5 weeks  1. Pt will initiate home exercise program to improve strength, flexibility, and functional mobility to return to PLOF.   2. Pt will report decreased right sided lower back pain  < / =  2/10  to demonstrate improved activity tolerance.   3. Pt will demonstrate improved lumbar ROM by 25% in extension and right lateral flexion in order to perform ADLs without difficulty.   4. Patient will perform 3 consecutive hip hinges with less than 3 VC to demonstrate improved lumbopelvic movement for safety during household ADL's and improved lifting techniques.     Long Term Goals: 10 weeks   1. Pt will tolerate sitting and standing for 20 minutes or more to demonstrate improved standing  "tolerance in order to meet workplace demands.   2. Patient will perform 3 consecutive hip hinges with less than 0 VC to demonstrate improved lumbopelvic movement for safety during household ADL's and improved lifting techniques.  3. Pt will report at least one instance of decreased pain with transfers when implementing proper form learned in therapy to show increased independence with functional mobility.   4. Patient will perform 12-15 sit to stands in 30 seconds, using "nose over toes" method, without UE support to demonstrate increased functional strength and lower extremity endurance.   5. Patient will initiate walking program to demonstrate self management of current condition.     PLAN     Emphasize core control.     Shanice Brooks, PTA     "

## 2022-07-19 NOTE — PROGRESS NOTES
Subjective:      Patient ID: Caryn Zarate is a 52 y.o. female.    Chief Complaint: Low-back Pain    Ms zarate is a 53 yo female sent in consultation by Dr. Laws for evaluation of low back pain.  She has been having back pain for the past 6 weeks after moving some pavers.  The pain initially was lower back and glutes on both sides.  Now the pain is mainly right lower back and the buttock.  She feels like the pain has improved.  She was severe and hard to stand straight initially.  She took steroids and muscle relaxers and that helped.  She has been going to PT at [facility] and that has been helping.  The pain is a stabbing pain.  The pain was constant but now it comes and goes.  The pain is worst with bending.  The achy pain is more with sitting.  She has been using heat and ice.  She is taking muscle relaxer 4 times a day initially, but not as often now.  The pain is 2/10 now, worst 8/10 shooting pain that lasts a few seconds, best 1/10 standing.  She is doing PT, she has not been to chiropractor, injections or surgery.      MRI lumbar 7/11/2022  The lumbar alignment is within normal limits.  There are 5 lumbar type vertebral body.  The vertebral body heights are maintained.     The conus terminates at the level of L1.  The cauda equina nerve roots are within normal limits.     There is stable appearance of disc desiccation at the L4-5 and L5-S1 levels.  There is stable appearance of the advanced endplate changes at the L5-S1 level.  Evaluation of the individual disc levels reveals the following:     L1-L2, normal.     L2-L3, normal     L3-L4, there is facet hypertrophy and ligamentum flavum hypertrophy.  The spinal canal and neural foramina are unremarkable.     L4-5, there is diffuse disc bulge along with facet hypertrophy and ligamentum flavum hypertrophy.  There is small amount of fluid within the facet joints.  The spinal canal is within normal limits.  The neural foramina is unremarkable.  There is mild  narrowing the lateral recesses.     L5-S1, there is diffuse disc bulge along with facet hypertrophy and ligamentum flavum hypertrophy.  There is unchanged appearance of central disc protrusion.  The spinal canal is within normal limits.  There is mild bilateral neural foraminal narrowing.  There is mild bilateral narrowing the lateral recesses.     There is a stable simple cyst in the left kidney.  The remainder of the retroperitoneal structures are within normal limits.     Impression:     No evidence of acute fracture or traumatic listhesis of the lumbar spine.     No cauda equina type lesion in the lumbar spine.     Stable appearance of degenerative changes at the L4-5 and L5-S1 levels with no significant spinal canal or neural foraminal narrowing.     Stable appearance of advanced facet disease at the L4-5 and L5-S1 levels.    X-ray lumbar 2022  Vertebral bodies are intact.  The L5-S1 disc space is narrowed with degenerative change bony structures are otherwise intact.     Impression:     See above    X-ray si joint 2022  Sacroiliac joints appear normal with no erosion or destruction.  Degenerative changes seen at the lumbosacral level.     Impression:     See above    Past Medical History:  No date: Depression  3/21/2018: Fatty liver: see u/s 3/18  3/18/2016: IFG (impaired fasting glucose)  No date: Obesity  3/21/2018: Renal cyst, left: see u/s 3/18  2014: Sleep apnea  3/18/2016: Vitamin D deficiency disease    Past Surgical History:  No date:  SECTION  2018: COLONOSCOPY; N/A      Comment:  Procedure: COLONOSCOPY;  Surgeon: Milton Park MD;                 Location: [address]);  Service: Endoscopy;                 Laterality: N/A;  3/24/2022: COLONOSCOPY; N/A      Comment:  Procedure: COLONOSCOPY;  Surgeon: Doe Rebolledo MD;  [facility] ([address] [place] [address]);  Service: Endoscopy;                Laterality: N/A;  Covid test on 3/21 at [email or web address]                 Fully Vaccinated.[facility]  No date: HYSTERECTOMY      Comment:  ovaries remain  4/14/2009: LAPAROSCOPIC HYSTERECTOMY      Comment:  Haris Dahl, for uterine fibroids  No date: TUBAL LIGATION    Review of patient's family history indicates:  Problem: Hypertension      Relation: Mother          Age of Onset: (Not Specified)  Problem: Diabetes      Relation: Father          Age of Onset: (Not Specified)  Problem: Kidney disease      Relation: Father          Age of Onset: (Not Specified)          Comment: Dialysis  Problem: Hypertension      Relation: Father          Age of Onset: (Not Specified)  Problem: Cancer      Relation: Maternal Grandmother          Age of Onset: (Not Specified)          Comment: breast  Problem: Breast cancer      Relation: Maternal Grandmother          Age of Onset: (Not Specified)  Problem: No Known Problems      Relation: Daughter          Age of Onset: (Not Specified)  Problem: No Known Problems      Relation: Daughter          Age of Onset: (Not Specified)  Problem: Breast cancer      Relation: Unknown          Age of Onset: (Not Specified)  Problem: Colon cancer      Relation: Neg Hx          Age of Onset: (Not Specified)  Problem: Ovarian cancer      Relation: Neg Hx          Age of Onset: (Not Specified)  Problem: Melanoma      Relation: Neg Hx          Age of Onset: (Not Specified)  Problem: Psoriasis      Relation: Neg Hx          Age of Onset: (Not Specified)  Problem: Lupus      Relation: Neg Hx          Age of Onset: (Not Specified)  Problem: Cirrhosis      Relation: Neg Hx          Age of Onset: (Not Specified)      Social History    Socioeconomic History      Marital status:       Number of children: 2    Occupational History        Employer: [employer]    Tobacco Use      Smoking status: Never Smoker      Smokeless tobacco: Never Used    Substance and Sexual Activity      Alcohol use: Yes        Comment: rarely      Drug use: No      Sexual activity: Yes         Partners: Male        Birth control/protection: Surgical      Current Outpatient Medications:  DULoxetine (CYMBALTA) 60 MG capsule, Take 1 capsule (60 mg total) by mouth once daily. AS NEED IT, Disp: 30 capsule, Rfl: 3  methylPREDNISolone (MEDROL, DARWIN,) 4 mg tablet, use as directed, Disp: 1 each, Rfl: 0  multivitamin (THERAGRAN) per tablet, Take 1 tablet by mouth once daily., Disp: , Rfl:   polyethylene glycol (GOLYTELY) 236-22.74-6.74 -5.86 gram suspension, TAKE 4000 ML BY MOUTH 1 TIME FOR 1 DOSE, Disp: , Rfl:     No current facility-administered medications for this visit.      Review of patient's allergies indicates:   -- Latex, natural rubber         Review of Systems   Constitutional: Negative for weight gain and weight loss.   Cardiovascular: Negative for chest pain.   Respiratory: Negative for shortness of breath.    Musculoskeletal: Positive for back pain. Negative for joint pain and joint swelling.   Gastrointestinal: Negative for abdominal pain, bowel incontinence, nausea and vomiting.   Genitourinary: Negative for bladder incontinence.   Neurological: Negative for numbness and paresthesias.         Objective:        General: Caryn is well-developed, well-nourished, appears stated age, in no acute distress, alert and oriented to time, place and person.     General    Vitals reviewed.  Constitutional: She is oriented to person, place, and time. She appears well-developed and well-nourished.   HENT:   Head: Normocephalic and atraumatic.   Pulmonary/Chest: Effort normal.   Neurological: She is alert and oriented to person, place, and time.   Psychiatric: She has a normal mood and affect. Her behavior is normal. Judgment and thought content normal.     General Musculoskeletal Exam   Gait: normal     Right Ankle/Foot Exam     Tests   Heel Walk: able to perform  Tiptoe Walk: able to perform    Left Ankle/Foot Exam     Tests   Heel Walk: able to perform  Tiptoe Walk: able to perform  Back (L-Spine & T-Spine)  / Neck (C-Spine) Exam     Tenderness Right paramedian tenderness of the Sacrum.     Back (L-Spine & T-Spine) Range of Motion   Extension: 20   Flexion: 90   Lateral bend right: 20   Lateral bend left: 20   Rotation right: 40   Rotation left: 40     Spinal Sensation   Right Side Sensation  C-Spine Level: normal   L-Spine Level: normal  S-Spine Level: normal  Left Side Sensation  C-Spine Level: normal  L-Spine Level: normal  S-Spine Level: normal    Back (L-Spine & T-Spine) Tests   Right Side Tests  Straight leg raise:      Sitting SLR: > 70 degrees      Left Side Tests  Straight leg raise:     Sitting SLR: > 70 degrees          Other She has no scoliosis .  Spinal Kyphosis:  Absent    Comments:  Pos MAEGAN bilaterally with right back pain      Muscle Strength   Right Upper Extremity   Biceps: 5/5   Deltoid:  5/5  Triceps:  5/5  Wrist extension: 5/5   Finger Flexors:  5/5  Left Upper Extremity  Biceps: 5/5   Deltoid:  5/5  Triceps:  5/5  Wrist extension: 5/5   Finger Flexors:  5/5  Right Lower Extremity   Hip Flexion: 5/5   Quadriceps:  5/5   Anterior tibial:  5/5   EHL:  5/5  Left Lower Extremity   Hip Flexion: 5/5   Quadriceps:  5/5   Anterior tibial:  5/5   EHL:  5/5    Reflexes     Left Side  Biceps:  2+  Triceps:  2+  Brachioradialis:  2+  Achilles:  2+  Left Jack's Sign:  Absent  Babinski Sign:  absent  Quadriceps:  2+    Right Side   Biceps:  2+  Triceps:  2+  Brachioradialis:  2+  Achilles:  2+  Right Jack's Sign:  absent  Babinski Sign:  absent  Quadriceps:  2+    Vascular Exam     Right Pulses        Carotid:                  2+    Left Pulses        Carotid:                  2+              Assessment:       1. Chronic midline low back pain without sciatica           Plan:       Orders Placed This Encounter    meloxicam (MOBIC) 15 MG tablet     1. We discussed back pain and the nature of back pain.  We discussed that it is not one thing that causes the pain but an accumulation of multiple things that  we do.  She is doing better.  We discussed MRI and degenerative changes in the facet and she will get better.  We did discuss SI joint as causing some of the pain.    2. We discussed posture sitting and the importance of trying to sit better.  And take standing breaks.  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  We discussed bending mechanics which she has already been taught by therapy.    3. We discussed the benefits of therapy and exercise and continuing to move.  She has been working with PT and feeling better  4. Physical therapy, she is doing well and will continue to work with therapy  5. mobic 15mg po Qday  6. Continue robaxin 500mg po QID as needed  7. We did discuss the possibility of injections, she is doing better and does not need now, but we discussed that it is an option  8. RTC 3 months    More than 50% of the total time  of 45 minutes was spent face to face in counseling on diagnosis and treatment options. I also counseled patient  on common and most usual side effect of prescribed medications.  I reviewed Primary care , and other specialty's notes to better coordinate patient's care. All questions were answered, and patient voiced understanding.         Follow-up: Follow up in about 3 months (around 10/21/2022). If there are any questions prior to this, the patient was instructed to contact the office.

## 2022-07-20 ENCOUNTER — TELEPHONE (OUTPATIENT)
Dept: SPINE | Facility: CLINIC | Age: 52
End: 2022-07-20
Payer: COMMERCIAL

## 2022-07-20 ENCOUNTER — OFFICE VISIT (OUTPATIENT)
Dept: UROLOGY | Facility: CLINIC | Age: 52
End: 2022-07-20
Attending: UROLOGY
Payer: COMMERCIAL

## 2022-07-20 VITALS
DIASTOLIC BLOOD PRESSURE: 66 MMHG | BODY MASS INDEX: 36.88 KG/M2 | HEART RATE: 87 BPM | SYSTOLIC BLOOD PRESSURE: 107 MMHG | WEIGHT: 257 LBS

## 2022-07-20 DIAGNOSIS — N39.3 STRESS INCONTINENCE: Primary | ICD-10-CM

## 2022-07-20 PROCEDURE — 3078F DIAST BP <80 MM HG: CPT | Mod: CPTII,S$GLB,, | Performed by: UROLOGY

## 2022-07-20 PROCEDURE — 3008F PR BODY MASS INDEX (BMI) DOCUMENTED: ICD-10-PCS | Mod: CPTII,S$GLB,, | Performed by: UROLOGY

## 2022-07-20 PROCEDURE — 1159F MED LIST DOCD IN RCRD: CPT | Mod: CPTII,S$GLB,, | Performed by: UROLOGY

## 2022-07-20 PROCEDURE — 3044F PR MOST RECENT HEMOGLOBIN A1C LEVEL <7.0%: ICD-10-PCS | Mod: CPTII,S$GLB,, | Performed by: UROLOGY

## 2022-07-20 PROCEDURE — 99203 OFFICE O/P NEW LOW 30 MIN: CPT | Mod: S$GLB,,, | Performed by: UROLOGY

## 2022-07-20 PROCEDURE — 1160F RVW MEDS BY RX/DR IN RCRD: CPT | Mod: CPTII,S$GLB,, | Performed by: UROLOGY

## 2022-07-20 PROCEDURE — 99203 PR OFFICE/OUTPT VISIT, NEW, LEVL III, 30-44 MIN: ICD-10-PCS | Mod: S$GLB,,, | Performed by: UROLOGY

## 2022-07-20 PROCEDURE — 1160F PR REVIEW ALL MEDS BY PRESCRIBER/CLIN PHARMACIST DOCUMENTED: ICD-10-PCS | Mod: CPTII,S$GLB,, | Performed by: UROLOGY

## 2022-07-20 PROCEDURE — 3078F PR MOST RECENT DIASTOLIC BLOOD PRESSURE < 80 MM HG: ICD-10-PCS | Mod: CPTII,S$GLB,, | Performed by: UROLOGY

## 2022-07-20 PROCEDURE — 3074F PR MOST RECENT SYSTOLIC BLOOD PRESSURE < 130 MM HG: ICD-10-PCS | Mod: CPTII,S$GLB,, | Performed by: UROLOGY

## 2022-07-20 PROCEDURE — 3008F BODY MASS INDEX DOCD: CPT | Mod: CPTII,S$GLB,, | Performed by: UROLOGY

## 2022-07-20 PROCEDURE — 3074F SYST BP LT 130 MM HG: CPT | Mod: CPTII,S$GLB,, | Performed by: UROLOGY

## 2022-07-20 PROCEDURE — 3044F HG A1C LEVEL LT 7.0%: CPT | Mod: CPTII,S$GLB,, | Performed by: UROLOGY

## 2022-07-20 PROCEDURE — 1159F PR MEDICATION LIST DOCUMENTED IN MEDICAL RECORD: ICD-10-PCS | Mod: CPTII,S$GLB,, | Performed by: UROLOGY

## 2022-07-20 NOTE — TELEPHONE ENCOUNTER
This message is for patient in regards to his or hers appointment 07/2122 at 8:00 am with Dr.Christine Bi M.D.    On the 4th floor [address] in the [facility]. We do ask that patients arrive 15 minutes before appointment time.  Patient may contact [phone] if there is any questions or concerns. Thank you for entrusting in [facility] with your care.

## 2022-07-21 ENCOUNTER — OFFICE VISIT (OUTPATIENT)
Dept: SPINE | Facility: CLINIC | Age: 52
End: 2022-07-21
Attending: PHYSICAL MEDICINE & REHABILITATION
Payer: COMMERCIAL

## 2022-07-21 ENCOUNTER — PATIENT MESSAGE (OUTPATIENT)
Dept: INTERNAL MEDICINE | Facility: CLINIC | Age: 52
End: 2022-07-21
Payer: COMMERCIAL

## 2022-07-21 VITALS
HEART RATE: 82 BPM | DIASTOLIC BLOOD PRESSURE: 59 MMHG | RESPIRATION RATE: 18 BRPM | BODY MASS INDEX: 37.24 KG/M2 | HEIGHT: 70 IN | WEIGHT: 260.13 LBS | TEMPERATURE: 99 F | SYSTOLIC BLOOD PRESSURE: 109 MMHG

## 2022-07-21 DIAGNOSIS — G89.29 CHRONIC MIDLINE LOW BACK PAIN WITHOUT SCIATICA: ICD-10-CM

## 2022-07-21 DIAGNOSIS — M54.50 CHRONIC MIDLINE LOW BACK PAIN WITHOUT SCIATICA: ICD-10-CM

## 2022-07-21 PROCEDURE — 1160F PR REVIEW ALL MEDS BY PRESCRIBER/CLIN PHARMACIST DOCUMENTED: ICD-10-PCS | Mod: CPTII,S$GLB,, | Performed by: PHYSICAL MEDICINE & REHABILITATION

## 2022-07-21 PROCEDURE — 99204 OFFICE O/P NEW MOD 45 MIN: CPT | Mod: S$GLB,,, | Performed by: PHYSICAL MEDICINE & REHABILITATION

## 2022-07-21 PROCEDURE — 1159F PR MEDICATION LIST DOCUMENTED IN MEDICAL RECORD: ICD-10-PCS | Mod: CPTII,S$GLB,, | Performed by: PHYSICAL MEDICINE & REHABILITATION

## 2022-07-21 PROCEDURE — 1160F RVW MEDS BY RX/DR IN RCRD: CPT | Mod: CPTII,S$GLB,, | Performed by: PHYSICAL MEDICINE & REHABILITATION

## 2022-07-21 PROCEDURE — 3078F DIAST BP <80 MM HG: CPT | Mod: CPTII,S$GLB,, | Performed by: PHYSICAL MEDICINE & REHABILITATION

## 2022-07-21 PROCEDURE — 99999 PR PBB SHADOW E&M-EST. PATIENT-LVL IV: ICD-10-PCS | Mod: PBBFAC,,, | Performed by: PHYSICAL MEDICINE & REHABILITATION

## 2022-07-21 PROCEDURE — 3074F SYST BP LT 130 MM HG: CPT | Mod: CPTII,S$GLB,, | Performed by: PHYSICAL MEDICINE & REHABILITATION

## 2022-07-21 PROCEDURE — 3074F PR MOST RECENT SYSTOLIC BLOOD PRESSURE < 130 MM HG: ICD-10-PCS | Mod: CPTII,S$GLB,, | Performed by: PHYSICAL MEDICINE & REHABILITATION

## 2022-07-21 PROCEDURE — 3008F BODY MASS INDEX DOCD: CPT | Mod: CPTII,S$GLB,, | Performed by: PHYSICAL MEDICINE & REHABILITATION

## 2022-07-21 PROCEDURE — 3044F PR MOST RECENT HEMOGLOBIN A1C LEVEL <7.0%: ICD-10-PCS | Mod: CPTII,S$GLB,, | Performed by: PHYSICAL MEDICINE & REHABILITATION

## 2022-07-21 PROCEDURE — 3008F PR BODY MASS INDEX (BMI) DOCUMENTED: ICD-10-PCS | Mod: CPTII,S$GLB,, | Performed by: PHYSICAL MEDICINE & REHABILITATION

## 2022-07-21 PROCEDURE — 1159F MED LIST DOCD IN RCRD: CPT | Mod: CPTII,S$GLB,, | Performed by: PHYSICAL MEDICINE & REHABILITATION

## 2022-07-21 PROCEDURE — 3078F PR MOST RECENT DIASTOLIC BLOOD PRESSURE < 80 MM HG: ICD-10-PCS | Mod: CPTII,S$GLB,, | Performed by: PHYSICAL MEDICINE & REHABILITATION

## 2022-07-21 PROCEDURE — 99999 PR PBB SHADOW E&M-EST. PATIENT-LVL IV: CPT | Mod: PBBFAC,,, | Performed by: PHYSICAL MEDICINE & REHABILITATION

## 2022-07-21 PROCEDURE — 3044F HG A1C LEVEL LT 7.0%: CPT | Mod: CPTII,S$GLB,, | Performed by: PHYSICAL MEDICINE & REHABILITATION

## 2022-07-21 PROCEDURE — 99204 PR OFFICE/OUTPT VISIT, NEW, LEVL IV, 45-59 MIN: ICD-10-PCS | Mod: S$GLB,,, | Performed by: PHYSICAL MEDICINE & REHABILITATION

## 2022-07-21 RX ORDER — MELOXICAM 15 MG/1
15 TABLET ORAL DAILY
Qty: 30 TABLET | Refills: 2 | Status: SHIPPED | OUTPATIENT
Start: 2022-07-21 | End: 2023-03-07 | Stop reason: SDUPTHER

## 2022-07-26 ENCOUNTER — CLINICAL SUPPORT (OUTPATIENT)
Dept: REHABILITATION | Facility: HOSPITAL | Age: 52
End: 2022-07-26
Payer: COMMERCIAL

## 2022-07-26 DIAGNOSIS — Z74.09 IMPAIRED FUNCTIONAL MOBILITY AND ACTIVITY TOLERANCE: Primary | ICD-10-CM

## 2022-07-26 PROCEDURE — 97110 THERAPEUTIC EXERCISES: CPT | Mod: CQ

## 2022-07-26 NOTE — PROGRESS NOTES
[facility]   Physical Therapy Treatment Note     Name: [facility] Number: [id number]    Therapy Diagnosis:   Encounter Diagnosis   Name Primary?    Impaired functional mobility and activity tolerance Yes     Physician: Layla Laws MD    Visit Date: 7/26/2022    Physician Orders: PT Eval and Treat   Medical Diagnosis from Referral: M54.50,G89.29 (ICD-10-CM) - Chronic midline low back pain without sciatica  Evaluation Date: 7/12/2022  Authorization Period Expiration: 12/31/2022  Plan of Care Expiration: 9/23/2022  Progress Note Due: 10th visit  Visit # / Visits authorized: 3 / 20 + eval    PTA Visit #: 3 / 5     Time In: 0755  Time Out: 0900  Total Treatment Time: 65 minutes  Total Billable Time: 65 minutes (4 TE)    Precautions: Standard     SUBJECTIVE     Patient reports: no new complaints today. She denies pain. Notes improvement in tolerance to standing.  She was compliant with home exercise program.  Response to previous treatment: good; appropriate muscle soreness  Functional change: improvement in standing tolerance; able to pull the weeds in her front yard, denies pain; completed 5 minutes of aerobics last night    Pain: 0/10, currently  Location: Right lumbar spine, buttock region    Patient goals: decrease pain, return to PLOF    OBJECTIVE     Objective Measures updated at progress report unless specified.     Treatment     Caryn received the treatments listed below:      THERAPEUTIC EXERCISES to develop strength, endurance, ROM, flexibility, posture and core stabilization for 65 minutes including:    Aerobic Activity to help improve mobility and tissue extensibility; Nustep for 10 minutes, Level 3   DKTC; 10 second, 2 x 10 reps - d/c to HEP  SKTC; 10 second hold, 2 x 10 reps on each LE - d/c to HEP  Piriformis stretch ER/IR; 10 second hold, 10 reps each direction - d/c to HEP  Pelvic tilts; 10 second hold, 3 x 10 reps - d/c to HEP  Pelvic tilt + glute squeeze; 5  second hold, 3 x 10 reps - d/c to HEP  Pelvic tilt + ball squeeze; 5 second hold, 2 x 10 reps - d/c to HEP  Pelvic tilt + hip fall out; Green PB band, 2 x 10 reps alternating  Pelvic tilt + bridge; Green PB band, 3 x 10 reps  +SL Clamshells; Green PB band, 2 x 10 reps on each LE  +Sit to stands in standard chair + airex + 8lb KB; 2 x 10 reps  +Shuttle DL squats; 3 plyo cords, 2 x 10 reps  +Matrix HS Curls; add next visit  +Long arc quads at EOM; add next visit    Patient Education and Home Exercises     Home Exercises Provided and Patient Education Provided     Education provided:   - Continued compliance with HEP; encouraged performance of basic core exercises at home to allow for progression in PT.    Written Home Exercises Provided: Patient instructed to cont prior HEP. Exercises were reviewed and Caryn was able to demonstrate them prior to the end of the session.  Caryn demonstrated good  understanding of the education provided. See EMR under Patient Instructions for exercises provided during therapy sessions    ASSESSMENT     Good tolerance to exercises performed noting appropriate muscle response. She did well with progression of exercises. Continue progressing per Plan of Care.  Caryn Is progressing well towards her goals.   Pt prognosis is Good.     Pt will continue to benefit from skilled outpatient physical therapy to address the deficits listed in the problem list box on initial evaluation, provide pt/family education and to maximize pt's level of independence in the home and community environment.     Pt's spiritual, cultural and educational needs considered and pt agreeable to plan of care and goals.     Anticipated barriers to physical therapy: none    Goals:   Short Term Goals: 5 weeks  1. Pt will initiate home exercise program to improve strength, flexibility, and functional mobility to return to PLOF.   2. Pt will report decreased right sided lower back pain  < / =  2/10  to demonstrate improved  "activity tolerance.   3. Pt will demonstrate improved lumbar ROM by 25% in extension and right lateral flexion in order to perform ADLs without difficulty.   4. Patient will perform 3 consecutive hip hinges with less than 3 VC to demonstrate improved lumbopelvic movement for safety during household ADL's and improved lifting techniques.     Long Term Goals: 10 weeks   1. Pt will tolerate sitting and standing for 20 minutes or more to demonstrate improved standing tolerance in order to meet workplace demands.   2. Patient will perform 3 consecutive hip hinges with less than 0 VC to demonstrate improved lumbopelvic movement for safety during household ADL's and improved lifting techniques.  3. Pt will report at least one instance of decreased pain with transfers when implementing proper form learned in therapy to show increased independence with functional mobility.   4. Patient will perform 12-15 sit to stands in 30 seconds, using "nose over toes" method, without UE support to demonstrate increased functional strength and lower extremity endurance.   5. Patient will initiate walking program to demonstrate self management of current condition.     PLAN     Emphasize core control.     Shanice Brooks PTA     "

## 2022-07-28 ENCOUNTER — CLINICAL SUPPORT (OUTPATIENT)
Dept: REHABILITATION | Facility: HOSPITAL | Age: 52
End: 2022-07-28
Payer: COMMERCIAL

## 2022-07-28 DIAGNOSIS — Z74.09 IMPAIRED FUNCTIONAL MOBILITY AND ACTIVITY TOLERANCE: Primary | ICD-10-CM

## 2022-07-28 PROCEDURE — 97110 THERAPEUTIC EXERCISES: CPT | Mod: CQ

## 2022-07-28 NOTE — PROGRESS NOTES
[facility]   Physical Therapy Treatment Note     Name: Caryn [facility]  Clinic Number: [id number]    Therapy Diagnosis:   Encounter Diagnosis   Name Primary?    Impaired functional mobility and activity tolerance Yes     Physician: Layla Laws MD    Visit Date: 7/28/2022    Physician Orders: PT Eval and Treat   Medical Diagnosis from Referral: M54.50,G89.29 (ICD-10-CM) - Chronic midline low back pain without sciatica  Evaluation Date: 7/12/2022  Authorization Period Expiration: 12/31/2022  Plan of Care Expiration: 9/23/2022  Progress Note Due: 10th visit  Visit # / Visits authorized: 4 / 20 + eval    PTA Visit #: 4 / 5     Time In: 0800  Time Out: 0900  Total Treatment Time: 60 minutes  Total Billable Time: 60 minutes (4 TE)    Precautions: Standard     SUBJECTIVE     Patient reports: that she has no pain today.   She was compliant with home exercise program.  Response to previous treatment: good; appropriate muscle soreness  Functional change: improvement in standing tolerance; able to pull the weeds in her front yard, denies pain; completed 5 minutes of aerobics last night    Pain: 0/10, currently  Location: Right lumbar spine, buttock region    Patient goals: decrease pain, return to PLOF    OBJECTIVE     Objective Measures updated at progress report unless specified.     Treatment     Caryn received the treatments listed below:      THERAPEUTIC EXERCISES to develop strength, endurance, ROM, flexibility, posture and core stabilization for 60 minutes including:    Aerobic Activity to help improve mobility and tissue extensibility; Nustep for 10 minutes, Level 4  Pelvic tilt + hip fall out; Green PB band, 2 x 10 reps alternating  Pelvic tilt + bridge; Green PB band, 3 x 10 reps  SL Clamshells; Green PB band, 3 x 10 reps on each LE  Sit to stands in standard chair + airex + 8lb KB; 3 x 10 reps  Shuttle DL squats; 3 plyo cords, 3 x 15 reps  +Matrix HS Curls; 35#, 2 x 12 reps  +Matrix Knee  Extension; 15#, 2 x 12 reps  +Modified RDL with 8lb KB; 2 x 10 reps    Patient Education and Home Exercises     Home Exercises Provided and Patient Education Provided     Education provided:   - Continued compliance with HEP; encouraged performance of basic core exercises at home to allow for progression in PT.    Written Home Exercises Provided: Patient instructed to cont prior HEP. Exercises were reviewed and Caryn was able to demonstrate them prior to the end of the session.  Caryn demonstrated good  understanding of the education provided. See EMR under Patient Instructions for exercises provided during therapy sessions    ASSESSMENT     Good tolerance to addition of exercises this session. Patient notes appropriate muscle response throughout. Mod cueing required for proper technique with hip hinge during RDL's; good response to cueing. Continue progressing patient per POC.  Caryn Is progressing well towards her goals.   Pt prognosis is Good.     Pt will continue to benefit from skilled outpatient physical therapy to address the deficits listed in the problem list box on initial evaluation, provide pt/family education and to maximize pt's level of independence in the home and community environment.     Pt's spiritual, cultural and educational needs considered and pt agreeable to plan of care and goals.     Anticipated barriers to physical therapy: none    Goals:   Short Term Goals: 5 weeks  1. Pt will initiate home exercise program to improve strength, flexibility, and functional mobility to return to PLOF.   2. Pt will report decreased right sided lower back pain  < / =  2/10  to demonstrate improved activity tolerance.   3. Pt will demonstrate improved lumbar ROM by 25% in extension and right lateral flexion in order to perform ADLs without difficulty.   4. Patient will perform 3 consecutive hip hinges with less than 3 VC to demonstrate improved lumbopelvic movement for safety during household ADL's and improved  "lifting techniques.     Long Term Goals: 10 weeks   1. Pt will tolerate sitting and standing for 20 minutes or more to demonstrate improved standing tolerance in order to meet workplace demands.   2. Patient will perform 3 consecutive hip hinges with less than 0 VC to demonstrate improved lumbopelvic movement for safety during household ADL's and improved lifting techniques.  3. Pt will report at least one instance of decreased pain with transfers when implementing proper form learned in therapy to show increased independence with functional mobility.   4. Patient will perform 12-15 sit to stands in 30 seconds, using "nose over toes" method, without UE support to demonstrate increased functional strength and lower extremity endurance.   5. Patient will initiate walking program to demonstrate self management of current condition.     PLAN     Emphasize core control.     Shanice Brooks, FINESSE     "

## 2022-08-02 ENCOUNTER — CLINICAL SUPPORT (OUTPATIENT)
Dept: REHABILITATION | Facility: HOSPITAL | Age: 52
End: 2022-08-02
Payer: COMMERCIAL

## 2022-08-02 DIAGNOSIS — Z74.09 IMPAIRED FUNCTIONAL MOBILITY AND ACTIVITY TOLERANCE: Primary | ICD-10-CM

## 2022-08-02 PROCEDURE — 97110 THERAPEUTIC EXERCISES: CPT | Mod: CQ

## 2022-08-02 NOTE — PROGRESS NOTES
[facility]   Physical Therapy Treatment Note     Name: Caryn [facility]  Clinic Number: [id number]    Therapy Diagnosis:   Encounter Diagnosis   Name Primary?    Impaired functional mobility and activity tolerance Yes     Physician: Layla Laws MD    Visit Date: 8/2/2022    Physician Orders: PT Eval and Treat   Medical Diagnosis from Referral: M54.50,G89.29 (ICD-10-CM) - Chronic midline low back pain without sciatica  Evaluation Date: 7/12/2022  Authorization Period Expiration: 12/31/2022  Plan of Care Expiration: 9/23/2022  Progress Note Due: 10th visit  Visit # / Visits authorized: 5 / 20 + eval    PTA Visit #: 5 / 5 *Will see PT on 8/4 at 5pm    Time In: 0813; patient presented to clinic 13 minutes late  Time Out: 0840; patient requested to leave early secondary to work engagement  Total Treatment Time: 27 minutes  Total Billable Time: 27 minutes (2 TE)    Precautions: Standard     SUBJECTIVE     Patient reports: increased pain in her shoulder and neck today secondary to sleeping wrong. She reports having piriformis pain this weekend that went from her low back and into her posterior thigh. States her stretches helped to alleviate her pain.  She was compliant with home exercise program.  Response to previous treatment: good; appropriate muscle soreness  Functional change: improvement in standing tolerance; able to pull the weeds in her front yard, denies pain; completed 5 minutes of aerobics last night    Pain: 0/10, currently in low back; 5/10 in Right shoulder/cervical spine  Location: Right lumbar spine, buttock region    Patient goals: decrease pain, return to PLOF    OBJECTIVE     Objective Measures updated at progress report unless specified.     Treatment     Caryn received the treatments listed below:      THERAPEUTIC EXERCISES to develop strength, endurance, ROM, flexibility, posture and core stabilization for 27 minutes including:    Aerobic Activity to help improve mobility  and tissue extensibility; Nustep for 5 minutes, Level 4  Pelvic tilt + hip fall out; Green PB band, 2 x 10 reps alternating  Pelvic tilt + bridge; Green PB band, 3 x 10 reps  SL Clamshells; Green PB band, 3 x 10 reps on each LE  Sit to stands in standard chair + airex + 8lb KB; 3 x 10 reps  Shuttle DL squats; 3 plyo cords, 3 x 15 reps  Matrix HS Curls; 35#, 2 x 12 reps  Matrix Knee Extension; 15#, 2 x 12 reps  Modified RDL with 8lb KB; 2 x 10 reps    Patient Education and Home Exercises     Home Exercises Provided and Patient Education Provided     Education provided:   - Continued compliance with HEP; encouraged performance of basic core exercises at home to allow for progression in PT.    Written Home Exercises Provided: Patient instructed to cont prior HEP. Exercises were reviewed and Caryn was able to demonstrate them prior to the end of the session.  Caryn demonstrated good  understanding of the education provided. See EMR under Patient Instructions for exercises provided during therapy sessions    ASSESSMENT     Shortened treatment session performed per patient request to leave early due to work. She demonstrated good tolerance to exercises performed.   Caryn Is progressing well towards her goals.   Pt prognosis is Good.     Pt will continue to benefit from skilled outpatient physical therapy to address the deficits listed in the problem list box on initial evaluation, provide pt/family education and to maximize pt's level of independence in the home and community environment.     Pt's spiritual, cultural and educational needs considered and pt agreeable to plan of care and goals.     Anticipated barriers to physical therapy: none    Goals:   Short Term Goals: 5 weeks  1. Pt will initiate home exercise program to improve strength, flexibility, and functional mobility to return to PLOF.   2. Pt will report decreased right sided lower back pain  < / =  2/10  to demonstrate improved activity tolerance.   3. Pt will  "demonstrate improved lumbar ROM by 25% in extension and right lateral flexion in order to perform ADLs without difficulty.   4. Patient will perform 3 consecutive hip hinges with less than 3 VC to demonstrate improved lumbopelvic movement for safety during household ADL's and improved lifting techniques.     Long Term Goals: 10 weeks   1. Pt will tolerate sitting and standing for 20 minutes or more to demonstrate improved standing tolerance in order to meet workplace demands.   2. Patient will perform 3 consecutive hip hinges with less than 0 VC to demonstrate improved lumbopelvic movement for safety during household ADL's and improved lifting techniques.  3. Pt will report at least one instance of decreased pain with transfers when implementing proper form learned in therapy to show increased independence with functional mobility.   4. Patient will perform 12-15 sit to stands in 30 seconds, using "nose over toes" method, without UE support to demonstrate increased functional strength and lower extremity endurance.   5. Patient will initiate walking program to demonstrate self management of current condition.     PLAN     Emphasize core control.     Shanice Brooks PTA     "

## 2022-08-04 ENCOUNTER — CLINICAL SUPPORT (OUTPATIENT)
Dept: REHABILITATION | Facility: HOSPITAL | Age: 52
End: 2022-08-04
Payer: COMMERCIAL

## 2022-08-04 DIAGNOSIS — Z74.09 IMPAIRED FUNCTIONAL MOBILITY AND ACTIVITY TOLERANCE: Primary | ICD-10-CM

## 2022-08-04 PROCEDURE — 97110 THERAPEUTIC EXERCISES: CPT

## 2022-08-04 NOTE — PROGRESS NOTES
[facility]   Physical Therapy Treatment Note / Discharge Summary    Name: Caryn [facility]oscanomer  Clinic Number: [id number]    Therapy Diagnosis:   Encounter Diagnosis   Name Primary?    Impaired functional mobility and activity tolerance Yes     Physician: Layla Laws MD    Visit Date: 8/4/2022    Physician Orders: PT Eval and Treat   Medical Diagnosis from Referral: M54.50,G89.29 (ICD-10-CM) - Chronic midline low back pain without sciatica  Evaluation Date: 7/12/2022  Authorization Period Expiration: 12/31/2022  Plan of Care Expiration: 9/23/2022  Progress Note Due: 10th visit  Visit # / Visits authorized: 6 / 20 + eval    Date of Last visit: 8/4/2022  Total Visits Received: 6  PTA Visit #: 0/5    Time In: 5:00 pm  Time Out: 5:35 pm  Total Treatment Time: 35 minutes  Total Billable Time: 35 minutes (2 TE)    Precautions: Standard     SUBJECTIVE     Patient reports: she feels great 0/10 pain and notes she would like to be discharged today.  She was compliant with home exercise program.  Response to previous treatment: good; appropriate muscle soreness  Functional change: patient reports she has hired someone to move her pavers and has been mindful of how she lifts things and notes she performs a log roll when getting out of bed    Pain: 0/10, currently in low back;   Location: Right lumbar spine, buttock region    Patient goals: decrease pain, return to PLOF    OBJECTIVE     Objective Measures updated at progress report unless specified.     Treatment     Caryn received the treatments listed below:      THERAPEUTIC EXERCISES to develop strength, endurance, ROM, flexibility, posture and core stabilization for 35 minutes including:    Aerobic Activity to help improve mobility and tissue extensibility; Nustep for 5 minutes, Level 4  Pelvic tilt + hip fall out; Green PB band, 2 x 10 reps alternating  Pelvic tilt + bridge; Green PB band, 3 x 10 reps  SL Clamshells; Green PB band, 3 x 10 reps on each  LE  Sit to stands in standard chair + airex + 8lb KB; 2 x 15 reps  Shuttle DL squats; 3 plyo cords, 3 x 15 reps  Matrix HS Curls; 35#, 3 x 12 reps  Matrix Knee Extension; 15#, 3 x 12 reps  Modified RDL with 8lb KB; 1 x 10 reps      Patient Education and Home Exercises     Home Exercises Provided and Patient Education Provided     Education provided:   - Continued compliance with HEP    Written Home Exercises Provided: Patient instructed to cont prior HEP. Exercises were reviewed and Caryn was able to demonstrate them prior to the end of the session.  Caryn demonstrated good  understanding of the education provided. See EMR under Patient Instructions for exercises provided during therapy sessions    ASSESSMENT     Patient has met all short term and long term physical therapy goals at this time. Patient demonstrates pain free lumbar range of motion. Patient has returned to full independence with all household and personal ADL's at this time. Patient will be discharged with an updated HEP.       Discharge FOTO Score: please see media section  Discharge reason: Patient has reached the maximum rehab potential for the present time and Patient requested discharge      Caryn Is progressing well towards her goals.   Pt prognosis is Good.     Pt will continue to benefit from skilled outpatient physical therapy to address the deficits listed in the problem list box on initial evaluation, provide pt/family education and to maximize pt's level of independence in the home and community environment.     Pt's spiritual, cultural and educational needs considered and pt agreeable to plan of care and goals.     Anticipated barriers to physical therapy: none    Goals:   Short Term Goals: 5 weeks  1. Pt will initiate home exercise program to improve strength, flexibility, and functional mobility to return to PLOF. MET  2. Pt will report decreased right sided lower back pain  < / =  2/10  to demonstrate improved  "activity tolerance. MET  3. Pt will demonstrate improved lumbar ROM by 25% in extension and right lateral flexion in order to perform ADLs without difficulty. MET  4. Patient will perform 3 consecutive hip hinges with less than 3 VC to demonstrate improved lumbopelvic movement for safety during household ADL's and improved lifting techniques. MET     Long Term Goals: 10 weeks   1. Pt will tolerate sitting and standing for 20 minutes or more to demonstrate improved standing tolerance in order to meet workplace demands. MET  2. Patient will perform 3 consecutive hip hinges with less than 0 VC to demonstrate improved lumbopelvic movement for safety during household ADL's and improved lifting techniques. MET  3. Pt will report at least one instance of decreased pain with transfers when implementing proper form learned in therapy to show increased independence with functional mobility. MET  4. Patient will perform 12-15 sit to stands in 30 seconds, using "nose over toes" method, without UE support to demonstrate increased functional strength and lower extremity endurance. MET  5. Patient will initiate walking program to demonstrate self management of current condition. MET    PLAN     This patient is discharged from Physical Therapy    Francy Padgett, PT     "

## 2023-03-06 NOTE — PROGRESS NOTES
Subjective:      Patient ID: Caryn Zarate is a 52 y.o. female.    Chief Complaint: No chief complaint on file.    The patient location is: [place]  The chief complaint leading to consultation is: follow up    Visit type: audiovisual    Face to Face time with patient: 16 min  20 minutes of total time spent on the encounter, which includes face to face time and non-face to face time preparing to see the patient (eg, review of tests), Obtaining and/or reviewing separately obtained history, Documenting clinical information in the electronic or other health record, Independently interpreting results (not separately reported) and communicating results to the patient/family/caregiver, or Care coordination (not separately reported).         Each patient to whom he or she provides medical services by telemedicine is:  (1) informed of the relationship between the physician and patient and the respective role of any other health care provider with respect to management of the patient; and (2) notified that he or she may decline to receive medical services by telemedicine and may withdraw from such care at any time.    Notes:     Ms zarate is a 53 yo female here for follow up of low back pain.  She was last seen by me 7/21/2022 and had been having back pain for the past 6 weeks after moving some pavers.  She was working with PT and improving.  She was on nsaid and robaxin.  The pain initially was lower back and glutes on both sides.  Now the pain is mainly right lower back and the buttock. She was discharged from PT in august with no pain.  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  Then noticed her right glute was tight and the outside of right leg in the morning.  She cannot lie to lie on the right side.  It is throbbing.  She has pain with driving and sitting.  She did not continue her HEP.  She does not know where her exercises. She took a little ibuprofen, but did not help.  She know  longer has mobic on robaxin.  She does not have tenderness to touch.      MRI lumbar 7/11/2022  The lumbar alignment is within normal limits.  There are 5 lumbar type vertebral body.  The vertebral body heights are maintained.     The conus terminates at the level of L1.  The cauda equina nerve roots are within normal limits.     There is stable appearance of disc desiccation at the L4-5 and L5-S1 levels.  There is stable appearance of the advanced endplate changes at the L5-S1 level.  Evaluation of the individual disc levels reveals the following:     L1-L2, normal.     L2-L3, normal     L3-L4, there is facet hypertrophy and ligamentum flavum hypertrophy.  The spinal canal and neural foramina are unremarkable.     L4-5, there is diffuse disc bulge along with facet hypertrophy and ligamentum flavum hypertrophy.  There is small amount of fluid within the facet joints.  The spinal canal is within normal limits.  The neural foramina is unremarkable.  There is mild narrowing the lateral recesses.     L5-S1, there is diffuse disc bulge along with facet hypertrophy and ligamentum flavum hypertrophy.  There is unchanged appearance of central disc protrusion.  The spinal canal is within normal limits.  There is mild bilateral neural foraminal narrowing.  There is mild bilateral narrowing the lateral recesses.     There is a stable simple cyst in the left kidney.  The remainder of the retroperitoneal structures are within normal limits.     Impression:     No evidence of acute fracture or traumatic listhesis of the lumbar spine.     No cauda equina type lesion in the lumbar spine.     Stable appearance of degenerative changes at the L4-5 and L5-S1 levels with no significant spinal canal or neural foraminal narrowing.     Stable appearance of advanced facet disease at the L4-5 and L5-S1 levels.    X-ray lumbar 7/7/2022  Vertebral bodies are intact.  The L5-S1 disc space is narrowed with degenerative change bony structures are  otherwise intact.     Impression:     See above    X-ray si joint 2022  Sacroiliac joints appear normal with no erosion or destruction.  Degenerative changes seen at the lumbosacral level.     Impression:     See above    Past Medical History:  No date: Depression  3/21/2018: Fatty liver: see u/s 3/18  3/18/2016: IFG (impaired fasting glucose)  No date: Obesity  3/21/2018: Renal cyst, left: see u/s 3/18  2014: Sleep apnea  3/18/2016: Vitamin D deficiency disease    Past Surgical History:  No date:  SECTION  2018: COLONOSCOPY; N/A      Comment:  Procedure: COLONOSCOPY;  Surgeon: Milton Park MD;                 Location: [facility] ENDO (4TH FLR);  Service: Endoscopy;                 Laterality: N/A;  3/24/2022: COLONOSCOPY; N/A      Comment:  Procedure: COLONOSCOPY;  Surgeon: Doe Rebolledo MD;  Location: [facility] ENDO ([facility] FLR);  Service: Endoscopy;                Laterality: N/A;  Covid test on 3/21 at [email or web address]                Fully [email or web address].[facility]  No date: HYSTERECTOMY      Comment:  ovaries remain  2009: LAPAROSCOPIC HYSTERECTOMY      Comment:  [facility]es[facility], for uterine fibroids  No date: TUBAL LIGATION    Review of patient's family history indicates:  Problem: Hypertension      Relation: Mother          Age of Onset: (Not Specified)  Problem: Diabetes      Relation: Father          Age of Onset: (Not Specified)  Problem: Kidney disease      Relation: Father          Age of Onset: (Not Specified)          Comment: Dialysis  Problem: Hypertension      Relation: Father          Age of Onset: (Not Specified)  Problem: Cancer      Relation: Maternal Grandmother          Age of Onset: (Not Specified)          Comment: breast  Problem: Breast cancer      Relation: Maternal Grandmother          Age of Onset: (Not Specified)  Problem: No Known Problems      Relation: Daughter          Age of Onset: (Not Specified)  Problem: No Known Problems      Relation: Daughter          Age  of Onset: (Not Specified)  Problem: Breast cancer      Relation: Unknown          Age of Onset: (Not Specified)  Problem: Colon cancer      Relation: Neg Hx          Age of Onset: (Not Specified)  Problem: Ovarian cancer      Relation: Neg Hx          Age of Onset: (Not Specified)  Problem: Melanoma      Relation: Neg Hx          Age of Onset: (Not Specified)  Problem: Psoriasis      Relation: Neg Hx          Age of Onset: (Not Specified)  Problem: Lupus      Relation: Neg Hx          Age of Onset: (Not Specified)  Problem: Cirrhosis      Relation: Neg Hx          Age of Onset: (Not Specified)      Social History    Socioeconomic History      Marital status:       Number of children: 2    Occupational History        Employer: [employer]    Tobacco Use      Smoking status: Never Smoker      Smokeless tobacco: Never Used    Substance and Sexual Activity      Alcohol use: Yes        Comment: rarely      Drug use: No      Sexual activity: Yes        Partners: Male        Birth control/protection: Surgical      Current Outpatient Medications:  DULoxetine (CYMBALTA) 60 MG capsule, Take 1 capsule (60 mg total) by mouth once daily. AS NEED IT, Disp: 30 capsule, Rfl: 3  methylPREDNISolone (MEDROL, DARWIN,) 4 mg tablet, use as directed, Disp: 1 each, Rfl: 0  multivitamin (THERAGRAN) per tablet, Take 1 tablet by mouth once daily., Disp: , Rfl:   polyethylene glycol (GOLYTELY) 236-22.74-6.74 -5.86 gram suspension, TAKE 4000 ML BY MOUTH 1 TIME FOR 1 DOSE, Disp: , Rfl:     No current facility-administered medications for this visit.      Review of patient's allergies indicates:   -- Latex, natural rubber         Review of Systems   Constitutional: Negative for weight gain and weight loss.   Cardiovascular:  Negative for chest pain.   Respiratory:  Negative for shortness of breath.    Musculoskeletal:  Positive for back pain. Negative for joint pain and joint swelling.   Gastrointestinal:  Negative for  abdominal pain, bowel incontinence, nausea and vomiting.   Genitourinary:  Negative for bladder incontinence.   Neurological:  Negative for numbness and paresthesias.       Objective:        General: Caryn is well-developed, well-nourished, appears stated age, in no acute distress, alert and oriented to time, place and person.     General    Constitutional: She is oriented to person, place, and time. She appears well-developed and well-nourished.   HENT:   Head: Normocephalic and atraumatic.   Pulmonary/Chest: Effort normal.   Neurological: She is alert and oriented to person, place, and time.   Psychiatric: She has a normal mood and affect. Her behavior is normal. Judgment and thought content normal.           virtual          Assessment:       1. Piriformis syndrome of right side           Plan:       Orders Placed This Encounter    Ambulatory referral/consult to Physical/Occupational Therapy    meloxicam (MOBIC) 15 MG tablet    methocarbamoL (ROBAXIN) 500 MG Tab     She had a flare up of pain after driving a lot during mardi gras.  Pain right glute and outside of right leg to the knee   We discussed posture sitting and the importance of trying to sit better.  And take standing breaks.  We also discussed a lumbar roll for the car, and a tennis ball under the glute and hamstring when driving  We discussed the benefits of therapy and exercise and continuing to move.  She did well with PT, but has not continued her HEP.  We discussed important to keep doing this  Physical therapy, for ITB stretches and piriformis stretches glute strengthening, back and core strengthening at [place]  mobic 15mg po Qday  Continue robaxin 500mg po QID as needed  RTC 3 months          Follow-up: Follow up in about 3 months (around 6/7/2023). If there are any questions prior to this, the patient was instructed to contact the office.

## 2023-03-07 ENCOUNTER — OFFICE VISIT (OUTPATIENT)
Dept: PHYSICAL MEDICINE AND REHAB | Facility: CLINIC | Age: 53
End: 2023-03-07
Payer: COMMERCIAL

## 2023-03-07 DIAGNOSIS — G57.01 PIRIFORMIS SYNDROME OF RIGHT SIDE: Primary | ICD-10-CM

## 2023-03-07 PROCEDURE — 99214 PR OFFICE/OUTPT VISIT, EST, LEVL IV, 30-39 MIN: ICD-10-PCS | Mod: 95,,, | Performed by: PHYSICAL MEDICINE & REHABILITATION

## 2023-03-07 PROCEDURE — 1160F PR REVIEW ALL MEDS BY PRESCRIBER/CLIN PHARMACIST DOCUMENTED: ICD-10-PCS | Mod: CPTII,95,, | Performed by: PHYSICAL MEDICINE & REHABILITATION

## 2023-03-07 PROCEDURE — 1159F PR MEDICATION LIST DOCUMENTED IN MEDICAL RECORD: ICD-10-PCS | Mod: CPTII,95,, | Performed by: PHYSICAL MEDICINE & REHABILITATION

## 2023-03-07 PROCEDURE — 99214 OFFICE O/P EST MOD 30 MIN: CPT | Mod: 95,,, | Performed by: PHYSICAL MEDICINE & REHABILITATION

## 2023-03-07 PROCEDURE — 1159F MED LIST DOCD IN RCRD: CPT | Mod: CPTII,95,, | Performed by: PHYSICAL MEDICINE & REHABILITATION

## 2023-03-07 PROCEDURE — 1160F RVW MEDS BY RX/DR IN RCRD: CPT | Mod: CPTII,95,, | Performed by: PHYSICAL MEDICINE & REHABILITATION

## 2023-03-07 RX ORDER — MELOXICAM 15 MG/1
15 TABLET ORAL DAILY
Qty: 30 TABLET | Refills: 2 | Status: SHIPPED | OUTPATIENT
Start: 2023-03-07

## 2023-03-07 RX ORDER — METHOCARBAMOL 500 MG/1
500 TABLET, FILM COATED ORAL 4 TIMES DAILY
Qty: 120 TABLET | Refills: 1 | Status: SHIPPED | OUTPATIENT
Start: 2023-03-07

## 2023-03-08 ENCOUNTER — CLINICAL SUPPORT (OUTPATIENT)
Dept: REHABILITATION | Facility: HOSPITAL | Age: 53
End: 2023-03-08
Attending: PHYSICAL MEDICINE & REHABILITATION
Payer: COMMERCIAL

## 2023-03-08 DIAGNOSIS — G57.01 PIRIFORMIS SYNDROME OF RIGHT SIDE: ICD-10-CM

## 2023-03-08 DIAGNOSIS — M54.50 ACUTE RIGHT-SIDED LOW BACK PAIN WITHOUT SCIATICA: ICD-10-CM

## 2023-03-08 PROCEDURE — 97140 MANUAL THERAPY 1/> REGIONS: CPT | Mod: PN

## 2023-03-08 PROCEDURE — 97110 THERAPEUTIC EXERCISES: CPT | Mod: PN

## 2023-03-08 PROCEDURE — 97161 PT EVAL LOW COMPLEX 20 MIN: CPT | Mod: PN

## 2023-03-08 NOTE — PLAN OF CARE
"  [facility]   Physical Therapy Initial Evaluation       Name: Caryn Zarate  Clinic Number: [id number]    Therapy Diagnosis:   Encounter Diagnoses   Name Primary?    Piriformis syndrome of right side     Acute right-sided low back pain without sciatica         Physician: Virginia Pat, *    Physician Orders: PT Eval and Treat   Medical Diagnosis from Referral: G57.01 (ICD-10-CM) - Piriformis syndrome of right side  Evaluation Date: 3/8/2023  Authorization Period Expiration: 03/06/2024  Plan of Care Expiration: 5/31/23  Progress Note Due: 4/7/23  Visit # / Visits authorized: 1/ 20   FOTO: 0/5    Precautions: Standard     Time In: 0700  Time Out: 0800  Total Appointment Time (timed & untimed codes): 60 minutes      SUBJECTIVE     Date of onset: since summer of 2022    History of current condition:  Per 3/7/23 referring MD note: "Notes:      Ms zarate is a 53 yo female here for follow up of low back pain.  She was last seen by me 7/21/2022 and had been having back pain for the past 6 weeks after moving some pavers.  She was working with PT and improving.  She was on nsaid and robaxin.  The pain initially was lower back and glutes on both sides.  Now the pain is mainly right lower back and the buttock. She was discharged from PT in august with no pain.  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  Then noticed her right glute was tight and the outside of right leg in the morning.  She cannot lie to lie on the right side.  It is throbbing.  She has pain with driving and sitting.  She did not continue her HEP.  She does not know where her exercises. She took a little ibuprofen, but did not help.  She know longer has mobic on robaxin.  She does not have tenderness to touch.  "     - Reviewed and confirmed above with patient; reports: that she was feeling much better since the summer, but stopped doing exercises and when she drove for [employer] over [employer], " "she had a lot of pain flare up and it has continued in the R buttock and R lateral calf.     Falls: denies    Imaging: CT scan films:   Per 22 Dr. Berg:  "Impression:     No evidence of acute fracture or traumatic listhesis of the lumbar spine.     No cauda equina type lesion in the lumbar spine.     Stable appearance of degenerative changes at the L4-5 and L5-S1 levels with no significant spinal canal or neural foraminal narrowing.     Stable appearance of advanced facet disease at the L4-5 and L5-S1 levels."    Prior Therapy: PT at [place]  Social History:  lives with an adult   Occupation:   Prior Level of Function: minimal pain prior to last summer  Current Level of Function: see below    Pain:  Current 4/10, worst 7/10, best 2/10   Location: R buttock and R lateral calf  Description: Aching, Throbbing, and Deep sharp throbbing, some numbness on top of the L foot  Aggravating Factors: Sitting, Standing, Walking, Lifting, and Getting out of bed/chair   Easing Factors: nothing  functional deficits as noted: Sitting tolerance: denies limitation until ~2 hours, Standing tolerance: 15-20', Ambulation tolerance:  10-15', Stairs: denies major limitation, Bending/lifting: denies limitation, ADLs: denies limitation, IADLs: denies limitation, Work duties:  - denies limitation, Sleep: wakes nightly throughout the night      Patients goals: get rid of pain, get back to workout routine     Medical History:   Past Medical History:   Diagnosis Date    Depression     Fatty liver: see u/s 3/18 3/21/2018    IFG (impaired fasting glucose) 3/18/2016    Obesity     Renal cyst, left: see u/s 3/18 3/21/2018    Sleep apnea 2014    Vitamin D deficiency disease 3/18/2016       Surgical History:   Caryn Zarate  has a past surgical history that includes Laparoscopic hysterectomy (2009); Tubal ligation; Hysterectomy;  section; Colonoscopy (N/A, 2018); and Colonoscopy " (N/A, 3/24/2022).    Medications:   Caryn has a current medication list which includes the following prescription(s): duloxetine, meloxicam, methocarbamol, and multivitamin.    Allergies:   Review of patient's allergies indicates:   Allergen Reactions    Latex, natural rubber           OBJECTIVE   Posture Alignment: increased lumbar lordosis  Palpation: TTP at R lateral gluteals, R thoracolumbar paraspinals, and R PSIS    LUMBAR SPINE AROM: %  Flexion: 75   Extension: 75   Left Sidebend: 80   Right Sidebend: 60   Left Rotation: 80   Right Rotation: 80       LOWER EXTREMITY STRENGTH:   Left Right   Quadriceps 5/5 5/5   Hamstrings 5/5 4/5     Iliopsoas 5/5 4+/5   Glute Med     Hip IR 5/5 4+/5   Hip ER 4/5 4-/5   Hip Ext 4/5 4-/5   Ankle DF 5/5 5/5   Ankle PF 5/5 5/5       Dermatomes: Sensation: Light Touch: Intact  Saddle Sensation: denies issues    Special Tests:   Left Right   Slump     SLR  +   Crossed SLR     Sacral Thrust     MAEGAN     Quadrant Test - -   Prone Instability Test       GAIT: Patient ambulates with no assistive device with independently.     GAIT DEVIATIONS: displays slightly decreased L step length with very slight R compensated Trendelenburg    Pt/family was provided educational information, including: role of PT, goals for PT, scheduling - pt verbalized understanding. Discussed insurance limitations with pt.            Limitation/Restriction for FOTO  Survey    Therapist reviewed FOTO scores for Caryn Zarate on 3/8/2023.   FOTO documents entered into [employer] - see Media section.    Limitation Score: not assessed today due to staff unable to capture         TREATMENT     Total Treatment time (time-based codes) separate from Evaluation: 35 minutes      Caryn received the treatments listed below:      therapeutic exercises to develop ROM and posture for 20 minutes including:  STRs  R supine cross-leg stretch  R QL doorway stretch  Supine, unilateral clamshells with red theraband    Educated on relevant  anatomy and pathology of current condition as well as likely prognosis  and general PT process.    Educated on importance of gentle, prolonged, frequent stretching vs. Intense and painful      manual therapy techniques: Myofacial release and Soft tissue Mobilization were applied for 15 minutes, including:  STM to R thoracolumbar paraspinals  PROM elongation through R thoracolumbar spine with contact at lower ribs and upper pelvis      PATIENT EDUCATION AND HOME EXERCISES     Education provided:   - see above    Written Home Exercises Provided: yes. Exercises were reviewed and Caryn was able to demonstrate them prior to the end of the session.  Caryn demonstrated good  understanding of the education provided. See EMR under Patient Instructions for exercises provided during therapy sessions.    ASSESSMENT     Caryn is a 52 y.o. female referred to outpatient Physical Therapy with a medical diagnosis of Piriformis syndrome of right side. Patient presents with signs and symptoms consistent with R SIJD with referred R LE sx; minimal concern for progressive neuro decline at this time. Patient tolerated the above manual therapy and therex well, noting good relief following. Should benefit from continued skilled PT to address remaining deficits and meet the goals from  evaluation.    Patient prognosis is Good.   Patient will benefit from skilled outpatient Physical Therapy to address the deficits stated above and in the chart below, provide patient /family education, and to maximize patientt's level of independence.     Plan of care discussed with patient: Yes  Patient's spiritual, cultural and educational needs considered and patient is agreeable to the plan of care and goals as stated below:     Anticipated Barriers for therapy: none    Medical Necessity is demonstrated by the following  History  Co-morbidities and personal factors that may impact the plan of care Co-morbidities:   Past Medical History:   Diagnosis Date     Depression     Fatty liver: see u/s 3/18 3/21/2018    IFG (impaired fasting glucose) 3/18/2016    Obesity     Renal cyst, left: see u/s 3/18 3/21/2018    Sleep apnea 5/26/2014    Vitamin D deficiency disease 3/18/2016       Personal Factors:   no deficits     low   Examination  Body Structures and Functions, activity limitations and participation restrictions that may impact the plan of care Body Regions:   back    Body Systems:    ROM  strength  gait  transfers    Participation Restrictions:    none    Activity limitations:   Learning and applying knowledge  no deficits    General Tasks and Commands  no deficits    Communication  no deficits    Mobility  no deficits    Self care  no deficits    Domestic Life  no deficits    Interactions/Relationships  no deficits    Life Areas  no deficits    Community and Social Life  no deficits         low   Clinical Presentation stable and uncomplicated low   Decision Making/ Complexity Score: low     Goals:  Short Term Goals: 4 weeks   1. Patient demonstrates independence with HEP.   2. Patient demonstrates independence with Postural Awareness.   3. Patient demonstrates independence with body mechanics.   4. Patient will report pain of 1/10 at worst, on 0-10 pain scale, with all activity  5. Patient demonstrates increased lumbar ROM to WFL to improve tolerance to functional activities pain free.   6. Patient demonstrates increased strength BLE's to 4+/5 or greater to improve tolerance to functional activities pain free.        Long Term GOALS: 12 weeks. Pt agrees with goals set.  1. Patient will be able to demonstrate proper sitting posture for at least 45 minutes in order to decrease likelihood of symptom flare and/or re-injury with driving and computerwork.  2. Patient will note standing/ambulation tolerance of at least 45 minutes for ability to move about community for IADLs such as cooking and grocery shopping.  3. Patient will sleep at reported PLOF.   4. Patient  demonstrates improved overall function per FOTO Lumbar Survey to 20% Limitation or less.      PLAN   Plan of care Certification: 3/8/2023 to 5/31/23.    Outpatient Physical Therapy 2 times weekly for 12 weeks to include the following interventions: Electrical Stimulation , Gait Training, Manual Therapy, Moist Heat/ Ice, Neuromuscular Re-ed, Therapeutic Activities, Therapeutic Exercise, and Ultrasound.     YARON HAMEED, PT      I CERTIFY THE NEED FOR THESE SERVICES FURNISHED UNDER THIS PLAN OF TREATMENT AND WHILE UNDER MY CARE   Physician's comments:     Physician's Signature: ___________________________________________________

## 2023-03-16 ENCOUNTER — CLINICAL SUPPORT (OUTPATIENT)
Dept: REHABILITATION | Facility: HOSPITAL | Age: 53
End: 2023-03-16
Attending: PHYSICAL MEDICINE & REHABILITATION
Payer: COMMERCIAL

## 2023-03-16 DIAGNOSIS — M54.50 ACUTE RIGHT-SIDED LOW BACK PAIN WITHOUT SCIATICA: Primary | ICD-10-CM

## 2023-03-16 PROCEDURE — 97112 NEUROMUSCULAR REEDUCATION: CPT | Mod: PN

## 2023-03-16 PROCEDURE — 97110 THERAPEUTIC EXERCISES: CPT | Mod: PN

## 2023-03-16 PROCEDURE — 97140 MANUAL THERAPY 1/> REGIONS: CPT | Mod: PN

## 2023-03-16 NOTE — PROGRESS NOTES
"[facility]   Physical Therapy Treatment Note     Name: Caryn Zarate  Clinic Number: [id number]    Therapy Diagnosis:   Encounter Diagnosis   Name Primary?    Acute right-sided low back pain without sciatica Yes     Physician: Virginia Pat, *    Visit Date: 3/16/2023    Therapy Diagnosis:        Encounter Diagnoses   Name Primary?    Piriformis syndrome of right side      Acute right-sided low back pain without sciatica          Physician: Virginia Pat, *     Physician Orders: PT Eval and Treat   Medical Diagnosis from Referral: G57.01 (ICD-10-CM) - Piriformis syndrome of right side  Evaluation Date: 3/8/2023  Authorization Period Expiration: 03/06/2024  Plan of Care Expiration: 5/31/23  Progress Note Due: 4/7/23  Visit # / Visits authorized: 1/ 20   FOTO: 0/5     Precautions: Standard     PTA Visit #: 0/5     Time In: 1620  Time Out: 1715  Total Billable Time: 55 minutes    SUBJECTIVE   Per eval:  "History of current condition:  Per 3/7/23 referring MD note: "Notes:      Ms zarate is a 53 yo female here for follow up of low back pain.  She was last seen by me 7/21/2022 and had been having back pain for the past 6 weeks after moving some pavers.  She was working with PT and improving.  She was on nsaid and robaxin.  The pain initially was lower back and glutes on both sides.  Now the pain is mainly right lower back and the buttock. She was discharged from PT in august with no pain.  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  Then noticed her right glute was tight and the outside of right leg in the morning.  She cannot lie to lie on the right side.  It is throbbing.  She has pain with driving and sitting.  She did not continue her HEP.  She does not know where her exercises. She took a little ibuprofen, but did not help.  She know longer has mobic on robaxin.  She does not have tenderness to touch.  "      - Reviewed and confirmed above " "with patient; reports: that she was feeling much better since the summer, but stopped doing exercises and when she drove for [employer] over [place], she had a lot of pain flare up and it has continued in the R buttock and R lateral calf. "    Pt reports: that she is feeling pretty well; better since the last session.  She was compliant with home exercise program.  Response to previous treatment: mild soreness, but better after  Functional change: able to drive for [employer] for 3 hours    Pain: 1/10  Location: dorsum of R foot (usually R buttock and R lateral calf)  Description: Aching, Throbbing, and Deep sharp throbbing, some numbness on top of the L foot    OBJECTIVE     Objective Measures updated at progress report unless specified.     Treatment     Caryn received the treatments listed below:      therapeutic exercises to develop strength, endurance, ROM, and core stabilization for 25 minutes including:  STRs  R supine cross-leg stretch  R QL doorway stretch  Supine, unilateral clamshells with red theraband  PPT + bridge  PPT + mini marches  Crabwalks with yellow band at knees  -cues for mini-squat form    manual therapy techniques: Joint mobilizations and Soft tissue Mobilization were applied for 15 minutes, including:  STM to R thoracolumbar paraspinals  PROM elongation through R thoracolumbar spine with contact at lower ribs and upper pelvis  R supine neutral lumbar gapping with contract-relax    neuromuscular re-education activities to improve: Posture and muscular activation for 15 minutes. The following activities were included:  Transverse abdominus activations in hooklying  -verbal and tactile cues for palpation and performance of isolated transverse abdominus  contraction vs. rectus or oblique abdominal contraction    Educated on sitting on towel roll between ischial tuberosities and sacrum for neutral  pelvis and use of towel roll for maintained lumbar lordosis.      Patient Education and Home Exercises "     Home Exercises Provided and Patient Education Provided     Education provided:   - see above    Written Home Exercises Provided: yes. Exercises were reviewed and Caryn was able to demonstrate them prior to the end of the session.  Caryn demonstrated good  understanding of the education provided. See EMR under Patient Instructions for exercises provided during therapy sessions    ASSESSMENT   Caryn is a 52 y.o. female referred to outpatient Physical Therapy with a medical diagnosis of Piriformis syndrome of right side. Patient presents with signs and symptoms consistent with R SIJD with referred R LE sx; minimal concern for progressive neuro decline at this time. Patient tolerated the above manual therapy and therex well, noting good relief following. Good understanding of the above core stabilization and postural re-ed; however, will require continued work at this Should benefit from continued skilled PT to address remaining deficits and meet the goals from  evaluation.      Caryn Is progressing well towards her goals.   Pt prognosis is Good.     Pt will continue to benefit from skilled outpatient physical therapy to address the deficits listed in the problem list box on initial evaluation, provide pt/family education and to maximize pt's level of independence in the home and community environment.     Pt's spiritual, cultural and educational needs considered and pt agreeable to plan of care and goals.     Anticipated barriers to physical therapy: none noted    Goals:  Short Term Goals: 4 weeks   1. Patient demonstrates independence with HEP.   2. Patient demonstrates independence with Postural Awareness.   3. Patient demonstrates independence with body mechanics.   4. Patient will report pain of 1/10 at worst, on 0-10 pain scale, with all activity  5. Patient demonstrates increased lumbar ROM to WFL to improve tolerance to functional activities pain free.   6. Patient demonstrates increased strength BLE's to 4+/5  or greater to improve tolerance to functional activities pain free.           Long Term GOALS: 12 weeks. Pt agrees with goals set.  1. Patient will be able to demonstrate proper sitting posture for at least 45 minutes in order to decrease likelihood of symptom flare and/or re-injury with driving and computerwork.  2. Patient will note standing/ambulation tolerance of at least 45 minutes for ability to move about community for IADLs such as cooking and grocery shopping.  3. Patient will sleep at reported PLOF.   4. Patient demonstrates improved overall function per FOTO Lumbar Survey to 20% Limitation or less.        PLAN   Plan of care Certification: 3/8/2023 to 5/31/23.     Outpatient Physical Therapy 2 times weekly for 12 weeks to include the following interventions: Electrical Stimulation , Gait Training, Manual Therapy, Moist Heat/ Ice, Neuromuscular Re-ed, Therapeutic Activities, Therapeutic Exercise, and Ultrasound.     YARON HAMEED, PT

## 2023-03-20 ENCOUNTER — TELEPHONE (OUTPATIENT)
Dept: INTERNAL MEDICINE | Facility: CLINIC | Age: 53
End: 2023-03-20
Payer: COMMERCIAL

## 2023-03-20 DIAGNOSIS — Z12.31 VISIT FOR SCREENING MAMMOGRAM: Primary | ICD-10-CM

## 2023-03-20 NOTE — TELEPHONE ENCOUNTER
----- Message from Emi Willis sent at 3/20/2023  8:26 AM CDT -----  Contact: Pt [phone]  Caller is requesting to schedule their annual screening mammogram appointment. Order is not listed in Epic.  Please enter order and contact patient to schedule.  Would the patient like a call back, or a response through their [facility] portal?:  Portal

## 2023-03-21 ENCOUNTER — CLINICAL SUPPORT (OUTPATIENT)
Dept: REHABILITATION | Facility: HOSPITAL | Age: 53
End: 2023-03-21
Attending: PHYSICAL MEDICINE & REHABILITATION
Payer: COMMERCIAL

## 2023-03-21 DIAGNOSIS — M54.50 ACUTE RIGHT-SIDED LOW BACK PAIN WITHOUT SCIATICA: Primary | ICD-10-CM

## 2023-03-21 PROCEDURE — 97112 NEUROMUSCULAR REEDUCATION: CPT | Mod: PN

## 2023-03-21 PROCEDURE — 97140 MANUAL THERAPY 1/> REGIONS: CPT | Mod: PN

## 2023-03-21 PROCEDURE — 97110 THERAPEUTIC EXERCISES: CPT | Mod: PN

## 2023-03-21 NOTE — PROGRESS NOTES
"[facility]   Physical Therapy Treatment Note     Name: Caryn Zarate  Clinic Number: [id number]    Therapy Diagnosis:   Encounter Diagnosis   Name Primary?    Acute right-sided low back pain without sciatica Yes     Physician: Virginia Pat, *    Visit Date: 3/21/2023    Therapy Diagnosis:        Encounter Diagnoses   Name Primary?    Piriformis syndrome of right side      Acute right-sided low back pain without sciatica          Physician: Virginia Pat, *     Physician Orders: PT Eval and Treat   Medical Diagnosis from Referral: G57.01 (ICD-10-CM) - Piriformis syndrome of right side  Evaluation Date: 3/8/2023  Authorization Period Expiration: 03/06/2024  Plan of Care Expiration: 5/31/23  Progress Note Due: 4/7/23  Visit # / Visits authorized: 1/ 20   FOTO: 0/5     Precautions: Standard     PTA Visit #: 0/5     Time In: 1620  Time Out: 1715  Total Billable Time: 55 minutes    SUBJECTIVE   Per eval:  "History of current condition:  Per 3/7/23 referring MD note: "Notes:      Ms zarate is a 53 yo female here for follow up of low back pain.  She was last seen by me 7/21/2022 and had been having back pain for the past 6 weeks after moving some pavers.  She was working with PT and improving.  She was on nsaid and robaxin.  The pain initially was lower back and glutes on both sides.  Now the pain is mainly right lower back and the buttock. She was discharged from PT in august with no pain.  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  Then noticed her right glute was tight and the outside of right leg in the morning.  She cannot lie to lie on the right side.  It is throbbing.  She has pain with driving and sitting.  She did not continue her HEP.  She does not know where her exercises. She took a little ibuprofen, but did not help.  She know longer has mobic on robaxin.  She does not have tenderness to touch.  "      - Reviewed and confirmed above " "with patient; reports: that she was feeling much better since the summer, but stopped doing exercises and when she drove for [employer] over [employer], she had a lot of pain flare up and it has continued in the R buttock and R lateral calf. "    Pt reports: that she is feeling pretty well; better since the last session.  She was compliant with home exercise program.  Response to previous treatment: mild soreness, but better after  Functional change: sleeping better and able to drive further    Pain: 1/10  Location: again dorsum of R foot (usually R buttock and R lateral calf)  Description: Aching, Throbbing, and Deep sharp throbbing, some numbness on top of the R foot    OBJECTIVE     Objective Measures updated at progress report unless specified.     Treatment     Caryn received the treatments listed below:      therapeutic exercises to develop strength, endurance, ROM, and core stabilization for 20 minutes including:  PPT + bridge  Standing B shoulder extensions with yellow band  -cues for neutral pelvis and proper TrA setting as below    Reviewed home exercise program. Educated to perform stretching therex daily/prn to maintain  flexibility/reduce pain and to perform strengthening therex as tolerated 3-5x/week to improve  functional strength.    NP at this date:  STRs  R supine cross-leg stretch  R QL doorway stretch  Supine, unilateral clamshells with red theraband  Crabwalks with yellow band at knees  -cues for mini-squat form    manual therapy techniques: Joint mobilizations and Soft tissue Mobilization were applied for 15 minutes, including:  STM to R thoracolumbar paraspinals with strumming and moderate TPR  STM to the R gluteals with mild TPR  PROM elongation through R thoracolumbar spine with contact at lower ribs and upper pelvis  R supine neutral lumbar gapping with contract-relax  R LE long-axis distraction    neuromuscular re-education activities to improve: Posture and muscular activation for 10 minutes. " The following activities were included:  Transverse abdominus activations in hooklying  -verbal and tactile cues for palpation and performance of isolated transverse abdominus  contraction vs. rectus or oblique abdominal contraction    Educated on sitting on towel roll between ischial tuberosities and sacrum for neutral  pelvis and use of towel roll for maintained lumbar lordosis.      Patient Education and Home Exercises     Home Exercises Provided and Patient Education Provided     Education provided:   - see above    Written Home Exercises Provided: yes. Exercises were reviewed and Caryn was able to demonstrate them prior to the end of the session.  Caryn demonstrated good  understanding of the education provided. See EMR under Patient Instructions for exercises provided during therapy sessions    ASSESSMENT   Caryn is a 52 y.o. female referred to outpatient Physical Therapy with a medical diagnosis of Piriformis syndrome of right side. Patient presents with signs and symptoms consistent with R SIJD with referred R LE sx; minimal concern for progressive neuro decline at this time. Improved LBP and gluteal sx, but some R dorsal foot pain at this session again. Patient tolerated the above manual therapy and therex well, noting good relief following. Good understanding of the above core stabilization NMR and postural re-ed; however, will require continued work at this for consistency and endurance. Should benefit from continued skilled PT to address remaining deficits and meet the goals from evaluation.      Caryn Is progressing well towards her goals.   Pt prognosis is Good.     Pt will continue to benefit from skilled outpatient physical therapy to address the deficits listed in the problem list box on initial evaluation, provide pt/family education and to maximize pt's level of independence in the home and community environment.     Pt's spiritual, cultural and educational needs considered and pt agreeable to plan of  care and goals.     Anticipated barriers to physical therapy: none noted    Goals:  Short Term Goals: 4 weeks   1. Patient demonstrates independence with HEP.   2. Patient demonstrates independence with Postural Awareness.   3. Patient demonstrates independence with body mechanics.   4. Patient will report pain of 1/10 at worst, on 0-10 pain scale, with all activity  5. Patient demonstrates increased lumbar ROM to WFL to improve tolerance to functional activities pain free.   6. Patient demonstrates increased strength BLE's to 4+/5 or greater to improve tolerance to functional activities pain free.           Long Term GOALS: 12 weeks. Pt agrees with goals set.  1. Patient will be able to demonstrate proper sitting posture for at least 45 minutes in order to decrease likelihood of symptom flare and/or re-injury with driving and computerwork.  2. Patient will note standing/ambulation tolerance of at least 45 minutes for ability to move about community for IADLs such as cooking and grocery shopping.  3. Patient will sleep at reported PLOF.   4. Patient demonstrates improved overall function per FOTO Lumbar Survey to 20% Limitation or less.        PLAN   Plan of care Certification: 3/8/2023 to 5/31/23.     Outpatient Physical Therapy 2 times weekly for 12 weeks to include the following interventions: Electrical Stimulation , Gait Training, Manual Therapy, Moist Heat/ Ice, Neuromuscular Re-ed, Therapeutic Activities, Therapeutic Exercise, and Ultrasound.     YARON HAMEED, PT

## 2023-03-31 ENCOUNTER — HOSPITAL ENCOUNTER (OUTPATIENT)
Dept: RADIOLOGY | Facility: HOSPITAL | Age: 53
Discharge: HOME | End: 2023-03-31
Attending: INTERNAL MEDICINE
Payer: COMMERCIAL

## 2023-03-31 DIAGNOSIS — Z12.31 VISIT FOR SCREENING MAMMOGRAM: ICD-10-CM

## 2023-03-31 PROCEDURE — 77063 MAMMO DIGITAL SCREENING BILAT WITH TOMO: ICD-10-PCS | Mod: 26,,, | Performed by: RADIOLOGY

## 2023-03-31 PROCEDURE — 77067 SCR MAMMO BI INCL CAD: CPT | Mod: TC,PO

## 2023-03-31 PROCEDURE — 77067 MAMMO DIGITAL SCREENING BILAT WITH TOMO: ICD-10-PCS | Mod: 26,,, | Performed by: RADIOLOGY

## 2023-03-31 PROCEDURE — 77063 BREAST TOMOSYNTHESIS BI: CPT | Mod: 26,,, | Performed by: RADIOLOGY

## 2023-03-31 PROCEDURE — 77067 SCR MAMMO BI INCL CAD: CPT | Mod: 26,,, | Performed by: RADIOLOGY

## 2023-04-04 ENCOUNTER — CLINICAL SUPPORT (OUTPATIENT)
Dept: REHABILITATION | Facility: HOSPITAL | Age: 53
End: 2023-04-04
Attending: PHYSICAL MEDICINE & REHABILITATION
Payer: COMMERCIAL

## 2023-04-04 DIAGNOSIS — M54.50 ACUTE RIGHT-SIDED LOW BACK PAIN WITHOUT SCIATICA: Primary | ICD-10-CM

## 2023-04-04 PROCEDURE — 97110 THERAPEUTIC EXERCISES: CPT | Mod: PN

## 2023-04-04 PROCEDURE — 97140 MANUAL THERAPY 1/> REGIONS: CPT | Mod: PN

## 2023-04-04 NOTE — PROGRESS NOTES
"[facility]   Physical Therapy Treatment Note     Name: Caryn Zarate  Clinic Number: [id number]    Therapy Diagnosis:   Encounter Diagnosis   Name Primary?    Acute right-sided low back pain without sciatica Yes       Physician: Virginia Pat, *    Visit Date: 4/4/2023    Therapy Diagnosis:        Encounter Diagnoses   Name Primary?    Piriformis syndrome of right side      Acute right-sided low back pain without sciatica          Physician: Virginia Pat, *     Physician Orders: PT Eval and Treat   Medical Diagnosis from Referral: G57.01 (ICD-10-CM) - Piriformis syndrome of right side  Evaluation Date: 3/8/2023  Authorization Period Expiration: 03/06/2024  Plan of Care Expiration: 5/31/23  Progress Note Due: 4/7/23  Visit # / Visits authorized: 3/ 20 + eval   FOTO: 0/5     Precautions: Standard     PTA Visit #: 0/5     Time In: 1625  Time Out: 1720  Total Billable Time: 55 minutes    SUBJECTIVE   Pt reports: that she is feeling pretty well with virtually no pain at this date  She was compliant with home exercise program.  Response to previous treatment: mild muscle soreness which resolved quickly  Functional change: sleeping better and able to drive further; has not driven much for [employer] this past week; however    Pain: 0/10  Location: NA (usually R buttock and R lateral calf)  Description: Aching, Throbbing, and Deep sharp throbbing; was some numbness on top of the R foot    Per eval:  "History of current condition:  Per 3/7/23 referring MD note: "Notes:      Ms zarate is a 53 yo female here for follow up of low back pain.  She was last seen by me 7/21/2022 and had been having back pain for the past 6 weeks after moving some pavers.  She was working with PT and improving.  She was on nsaid and robaxin.  The pain initially was lower back and glutes on both sides.  Now the pain is mainly right lower back and the buttock. She was discharged from PT in august with no pain.  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  She was driving a lot with [employer].  Then noticed her right glute was tight and the outside of right leg in the morning.  She cannot lie to lie on the right side.  It is throbbing.  She has pain with driving and sitting.  She did not continue her HEP.  She does not know where her exercises. She took a little ibuprofen, but did not help.  She know longer has mobic on robaxin.  She does not have tenderness to touch.  "      - Reviewed and confirmed above with patient; reports: that she was feeling much better since the summer, but stopped doing exercises and when she drove for [employer] over [employer], she had a lot of pain flare up and it has continued in the R buttock and R lateral calf. "      OBJECTIVE     Objective Measures updated at progress report unless specified.     Treatment     Caryn received the treatments listed below:      therapeutic exercises to develop strength, endurance, ROM, and core stabilization for 45 minutes includin-3x10-15 reps each  PPT + bridge  Standing B shoulder extensions with yellow band  -cues for neutral pelvis and proper TrA setting as below  Standing B hip ABDuction  Standing B hip extension  Walkouts on cable-cross, L and R and fwd/retro with 7#  -cues for tall standing, transverse abdominus activation and slowly driving weight with LEs vs.  leaning and relying on gravity/momentum  STRs  R supine cross-leg stretch  R QL doorway stretch  Supine, unilateral clamshells with Green theraband    Crabwalks with Red band at knees - NP, but issued green band for progression    manual therapy techniques: Joint mobilizations and Soft tissue Mobilization were applied for 10 minutes, including:  STM to R thoracolumbar paraspinals with strumming and moderate TPR  PROM elongation through R thoracolumbar spine with contact at lower ribs and upper pelvis  Grades I-III R lumbar UPAs with L sacral base counter-pressure    NP at this date:  STM to the R " gluteals with mild TPR  R supine neutral lumbar gapping with contract-relax  R LE long-axis distraction    neuromuscular re-education activities to improve: Posture and muscular activation for 00 minutes. The following activities were included:  Transverse abdominus activations in hooklying  -verbal and tactile cues for palpation and performance of isolated transverse abdominus  contraction vs. rectus or oblique abdominal contraction    Educated on sitting on towel roll between ischial tuberosities and sacrum for neutral  pelvis and use of towel roll for maintained lumbar lordosis.      Patient Education and Home Exercises     Home Exercises Provided and Patient Education Provided     Education provided:   - see above    Written Home Exercises Provided: yes. Exercises were reviewed and Caryn was able to demonstrate them prior to the end of the session.  Caryn demonstrated good  understanding of the education provided. See EMR under Patient Instructions for exercises provided during therapy sessions    ASSESSMENT   Caryn is a 52 y.o. female referred to outpatient Physical Therapy with a medical diagnosis of Piriformis syndrome of right side. Patient presents with signs and symptoms consistent with R SIJD with referred R LE sx; minimal concern for progressive neuro decline at this time. Minimal LBP or R LE sx as of late. Tolerated the above therex strengthening progression well with some pain following, which responded well to the above manual therapy. Should benefit from continued skilled PT to address remaining deficits and meet the goals from evaluation, likely nearing D/c to HEP in the next few visits.    Caryn Is progressing well towards her goals.   Pt prognosis is Good.     Pt will continue to benefit from skilled outpatient physical therapy to address the deficits listed in the problem list box on initial evaluation, provide pt/family education and to maximize pt's level of independence in the home and community  environment.     Pt's spiritual, cultural and educational needs considered and pt agreeable to plan of care and goals.     Anticipated barriers to physical therapy: none noted    Goals:  Short Term Goals: 4 weeks   1. Patient demonstrates independence with HEP.   2. Patient demonstrates independence with Postural Awareness.   3. Patient demonstrates independence with body mechanics.   4. Patient will report pain of 1/10 at worst, on 0-10 pain scale, with all activity  5. Patient demonstrates increased lumbar ROM to WFL to improve tolerance to functional activities pain free.   6. Patient demonstrates increased strength BLE's to 4+/5 or greater to improve tolerance to functional activities pain free.           Long Term GOALS: 12 weeks. Pt agrees with goals set.  1. Patient will be able to demonstrate proper sitting posture for at least 45 minutes in order to decrease likelihood of symptom flare and/or re-injury with driving and computerwork.  2. Patient will note standing/ambulation tolerance of at least 45 minutes for ability to move about community for IADLs such as cooking and grocery shopping.  3. Patient will sleep at reported PLOF.   4. Patient demonstrates improved overall function per FOTO Lumbar Survey to 20% Limitation or less.        PLAN   Plan of care Certification: 3/8/2023 to 5/31/23.     Outpatient Physical Therapy 2 times weekly for 12 weeks to include the following interventions: Electrical Stimulation , Gait Training, Manual Therapy, Moist Heat/ Ice, Neuromuscular Re-ed, Therapeutic Activities, Therapeutic Exercise, and Ultrasound.     YARON HAMEED, PT

## 2023-04-11 ENCOUNTER — PATIENT MESSAGE (OUTPATIENT)
Dept: RESEARCH | Facility: HOSPITAL | Age: 53
End: 2023-04-11
Payer: COMMERCIAL

## 2023-04-11 ENCOUNTER — TELEPHONE (OUTPATIENT)
Dept: INTERNAL MEDICINE | Facility: CLINIC | Age: 53
End: 2023-04-11
Payer: COMMERCIAL

## 2023-04-11 NOTE — TELEPHONE ENCOUNTER
She will be due for an appointment with me in the summer for annual, please contact her to schedule at her convenience, thank you    Usual labs prior

## 2023-04-28 ENCOUNTER — CLINICAL SUPPORT (OUTPATIENT)
Dept: REHABILITATION | Facility: HOSPITAL | Age: 53
End: 2023-04-28
Attending: PHYSICAL MEDICINE & REHABILITATION
Payer: COMMERCIAL

## 2023-04-28 DIAGNOSIS — M54.50 ACUTE RIGHT-SIDED LOW BACK PAIN WITHOUT SCIATICA: Primary | ICD-10-CM

## 2023-04-28 PROCEDURE — 97110 THERAPEUTIC EXERCISES: CPT | Mod: PN

## 2023-04-28 PROCEDURE — 97140 MANUAL THERAPY 1/> REGIONS: CPT | Mod: PN

## 2023-04-28 NOTE — PROGRESS NOTES
"[facility]   Physical Therapy Treatment Note     Name: Caryn Zarate  Clinic Number: [id number]    Therapy Diagnosis:   Encounter Diagnosis   Name Primary?    Acute right-sided low back pain without sciatica Yes     Physician: Virginia Pat, *    Visit Date: 4/28/2023    Therapy Diagnosis:        Encounter Diagnoses   Name Primary?    Piriformis syndrome of right side      Acute right-sided low back pain without sciatica          Physician: Virginia Pat, *     Physician Orders: PT Eval and Treat   Medical Diagnosis from Referral: G57.01 (ICD-10-CM) - Piriformis syndrome of right side  Evaluation Date: 3/8/2023  Authorization Period Expiration: 03/06/2024  Plan of Care Expiration: 5/31/23  Progress Note Due: 4/7/23  Visit # / Visits authorized: 4/20 + eval   FOTO: 0/5     Precautions: Standard     PTA Visit #: 0/5     Time In: 8:20 am (late)  Time Out: 9:05  Total Billable Time: 45 minutes    SUBJECTIVE   Pt reports: not too much pain but she still has some numbness going down her R foot  She was compliant with home exercise program.  Response to previous treatment: mild muscle soreness which resolved quickly  Functional change: sleeping better and able to drive further; has not driven much for ub[employer] this past week; however    Pain: 0/10  Location: NA (usually R buttock and R lateral calf)  Description: Aching, Throbbing, and Deep sharp throbbing; was some numbness on top of the R foot    Per eval:  "History of current condition:  Per 3/7/23 referring MD note: "Notes:      Ms zarate is a 53 yo female here for follow up of low back pain.  She was last seen by me 7/21/2022 and had been having back pain for the past 6 weeks after moving some pavers.  She was working with PT and improving.  She was on nsaid and robaxin.  The pain initially was lower back and glutes on both sides.  Now the pain is mainly right lower back and the buttock. She was discharged from PT in august with no " "pain.  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  She was driving a lot with [email or web address].  Then noticed her right glute was tight and the outside of right leg in the morning.  She cannot lie to lie on the right side.  It is throbbing.  She has pain with driving and sitting.  She did not continue her HEP.  She does not know where her exercises. She took a little ibuprofen, but did not help.  She know longer has mobic on robaxin.  She does not have tenderness to touch.  "      - Reviewed and confirmed above with patient; reports: that she was feeling much better since the summer, but stopped doing exercises and when she drove for [employer] over [employer], she had a lot of pain flare up and it has continued in the R buttock and R lateral calf. "      OBJECTIVE     Objective Measures updated at progress report unless specified.     Treatment     Caryn received the treatments listed below:      Bolded = performed    therapeutic exercises to develop strength, endurance, ROM, and core stabilization for 38 minutes includin-3x10-15 reps each  PPT + bridge  Standing B shoulder extensions with +Blue TB   -cues for neutral pelvis and proper TrA setting as below  Standing B hip ABDuction 3x10  Standing B hip extension 3x10  Walkouts on cable-cross, L and R and fwd/retro with 7#  -cues for tall standing, transverse abdominus activation and slowly driving weight with LEs vs.  leaning and relying on gravity/momentum  STRs  R supine cross-leg stretch  R QL doorway stretch  Supine, unilateral clamshells with Green theraband  Crabwalks with GTB x2 laps  + Monster Walks GTB x2 laps  + TRX Squat 3x10    manual therapy techniques: Joint mobilizations and Soft tissue Mobilization were applied for 08 minutes, including:  STM to R thoracolumbar paraspinals with strumming and moderate TPR  PROM elongation through R thoracolumbar spine with contact at lower ribs and upper pelvis - np  Grades I-III R lumbar UPAs with L sacral base " counter-pressure    NP at this date:  STM to the R gluteals with mild TPR  R supine neutral lumbar gapping with contract-relax  R LE long-axis distraction    neuromuscular re-education activities to improve: Posture and muscular activation for 00 minutes. The following activities were included:  Transverse abdominus activations in hooklying  -verbal and tactile cues for palpation and performance of isolated transverse abdominus  contraction vs. rectus or oblique abdominal contraction    Educated on sitting on towel roll between ischial tuberosities and sacrum for neutral  pelvis and use of towel roll for maintained lumbar lordosis.      Patient Education and Home Exercises     Home Exercises Provided and Patient Education Provided     Education provided:   - see above    Written Home Exercises Provided: yes. Exercises were reviewed and Caryn was able to demonstrate them prior to the end of the session.  Caryn demonstrated good  understanding of the education provided. See EMR under Patient Instructions for exercises provided during therapy sessions    ASSESSMENT   Caryn is a 52 y.o. female referred to outpatient Physical Therapy with a medical diagnosis of Piriformis syndrome of right side. Patient presents with signs and symptoms consistent with R SIJD with referred R LE sx; minimal concern for progressive neuro decline at this time. Minimal LBP or R LE sx as of late.   Shortened session due to pt tardiness. Pt was adequately challenged with exercises, and tolerated progressions well. Pt came into clinic with n/t in R foot, but was alleviated following supine piriformis stretching. Initiated TRX squats to further develop core and LE strength, along with hip mobility. Mod VC throughout to establish proper form and to minimize compensations. No adverse effects    Caryn Is progressing well towards her goals.   Pt prognosis is Good.     Pt will continue to benefit from skilled outpatient physical therapy to address the  deficits listed in the problem list box on initial evaluation, provide pt/family education and to maximize pt's level of independence in the home and community environment.     Pt's spiritual, cultural and educational needs considered and pt agreeable to plan of care and goals.     Anticipated barriers to physical therapy: none noted    Goals:  Short Term Goals: 4 weeks   1. Patient demonstrates independence with HEP.   2. Patient demonstrates independence with Postural Awareness.   3. Patient demonstrates independence with body mechanics.   4. Patient will report pain of 1/10 at worst, on 0-10 pain scale, with all activity  5. Patient demonstrates increased lumbar ROM to WFL to improve tolerance to functional activities pain free.   6. Patient demonstrates increased strength BLE's to 4+/5 or greater to improve tolerance to functional activities pain free.           Long Term GOALS: 12 weeks. Pt agrees with goals set.  1. Patient will be able to demonstrate proper sitting posture for at least 45 minutes in order to decrease likelihood of symptom flare and/or re-injury with driving and computerwork.  2. Patient will note standing/ambulation tolerance of at least 45 minutes for ability to move about community for IADLs such as cooking and grocery shopping.  3. Patient will sleep at reported PLOF.   4. Patient demonstrates improved overall function per FOTO Lumbar Survey to 20% Limitation or less.        PLAN   Plan of care Certification: 3/8/2023 to 5/31/23.     Outpatient Physical Therapy 2 times weekly for 12 weeks to include the following interventions: Electrical Stimulation , Gait Training, Manual Therapy, Moist Heat/ Ice, Neuromuscular Re-ed, Therapeutic Activities, Therapeutic Exercise, and Ultrasound.     Carlos Hdz, PT

## 2023-05-03 ENCOUNTER — CLINICAL SUPPORT (OUTPATIENT)
Dept: REHABILITATION | Facility: HOSPITAL | Age: 53
End: 2023-05-03
Attending: PHYSICAL MEDICINE & REHABILITATION
Payer: COMMERCIAL

## 2023-05-03 DIAGNOSIS — M54.50 ACUTE RIGHT-SIDED LOW BACK PAIN WITHOUT SCIATICA: Primary | ICD-10-CM

## 2023-05-03 PROCEDURE — 97140 MANUAL THERAPY 1/> REGIONS: CPT | Mod: PN

## 2023-05-03 PROCEDURE — 97110 THERAPEUTIC EXERCISES: CPT | Mod: PN

## 2023-05-03 NOTE — PROGRESS NOTES
"[facility]   Physical Therapy Treatment Note     Name: Caryn Zarate  Clinic Number: [id number]    Therapy Diagnosis:   Encounter Diagnosis   Name Primary?    Acute right-sided low back pain without sciatica Yes     Physician: Virginia Pat, *    Visit Date: 5/3/2023    Therapy Diagnosis:        Encounter Diagnoses   Name Primary?    Piriformis syndrome of right side      Acute right-sided low back pain without sciatica          Physician: Virginia Pat, *     Physician Orders: PT Eval and Treat   Medical Diagnosis from Referral: G57.01 (ICD-10-CM) - Piriformis syndrome of right side  Evaluation Date: 3/8/2023  Authorization Period Expiration: 03/06/2024  Plan of Care Expiration: 5/31/23  Progress Note Due: 6/4/23   Visit # / Visits authorized: 5/20 + eval   FOTO: 1/5     Precautions: Standard     PTA Visit #: 0/5     Time In: 0715  Time Out: 0810  Total Billable Time: 55 minutes    SUBJECTIVE   Pt reports: that she was doing better, but reports that she has been driving more (4'+4') and notes increased pain in the R lateral calf once done and first thing int he morning - 8-9/10 pain. Better once she is moving.     She was compliant with home exercise program.  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  She was last seen by me 7/21/2022 and had been having back pain for the past 6 weeks after moving some pavers.  She was working with PT and improving.  She was on nsaid and robaxin.  The pain initially was lower back " "and glutes on both sides.  Now the pain is mainly right lower back and the buttock. She was discharged from PT in august with no pain.  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  Then noticed her right glute was tight and the outside of right leg in the morning.  She cannot lie to lie on the right side.  It is throbbing.  She has pain with driving and sitting.  She did not continue her HEP.  She does not know where her exercises. She took a little ibuprofen, but did not help.  She know longer has mobic on robaxin.  She does not have tenderness to touch.  "      - Reviewed and confirmed above with patient; reports: that she was feeling much better since the summer, but stopped doing exercises and when she drove for [employer] over [employer], she had a lot of pain flare up and it has continued in the R buttock and R lateral calf. "      OBJECTIVE   Objective Measures updated at progress report unless specified.      LUMBAR SPINE AROM: %   eval 23   Flexion: 75 75   Extension: 75 75   Left Sidebend: 80 80   Right Sidebend: 60 80   Left Rotation: 80 90   Right Rotation: 80 80         LOWER EXTREMITY STRENGTH:    Left eval 23 Right eval 23   Quadriceps 5/5 5 5/5 5   Hamstrings 5/5 5 4/5 5      Iliopsoas 5/5 5 4+/5 5   Glute Med         Hip IR 5/5 5 4+/5 5   Hip ER 4/5 4/5 4-/5 4-   Hip Ext 4/5 4 4-/5 4-   Ankle DF 5/5 5 5/5 5   Ankle PF 5/5 5 5/5 5         Limitation/Restriction for Back FOTO Survey     Therapist reviewed FOTO scores for Caryn Zarate on 5/3/2023.   FOTO documents entered into [employer] - see Media section.     Limitation Score: 33%           Treatment     Caryn received the treatments listed below:      Bolded = performed    therapeutic exercises to develop strength, endurance, ROM, and core stabilization for 30 minutes includin-3x10-15 reps each  Time spent for above reassessment an goal updating   Quadriped hip ER, fire hydrants   -cues for avoiding excess " lateral lean  PPT + bridge x10  Standing B hip ABDuction with yellow band 3x10  Standing B hip extension with yellow band 3x10    NP at this date:  Standing B shoulder extensions with +Blue TB   -cues for neutral pelvis and proper TrA setting as below  Walkouts on cable-cross, L and R and fwd/retro with 7#  -cues for tall standing, transverse abdominus activation and slowly driving weight with LEs vs.  leaning and relying on gravity/momentum  STRs  R supine cross-leg stretch  R QL doorway stretch  Supine, unilateral clamshells with Green theraband  Crabwalks with GTB x2 laps  + Monster Walks GTB x2 laps  + TRX Squat 3x10    manual therapy techniques: Joint mobilizations and Soft tissue Mobilization were applied for 25 minutes, including:  STM to R thoracolumbar paraspinals with strumming and moderate TPR  PROM elongation through R thoracolumbar spine with contact at lower ribs and upper pelvis   Grades I-III R lumbar UPAs with L sacral base counter-pressure  STM to the R gluteals with mild TPR  R supine neutral lumbar gapping with contract-relax  R LE long-axis distraction    neuromuscular re-education activities to improve: Posture and muscular activation for 00 minutes. The following activities were included:  Transverse abdominus activations in hooklying  -verbal and tactile cues for palpation and performance of isolated transverse abdominus  contraction vs. rectus or oblique abdominal contraction    Educated on sitting on towel roll between ischial tuberosities and sacrum for neutral  pelvis and use of towel roll for maintained lumbar lordosis.      Patient Education and Home Exercises     Home Exercises Provided and Patient Education Provided     Education provided:   - see above    Written Home Exercises Provided: yes. Exercises were reviewed and Caryn was able to demonstrate them prior to the end of the session.  Caryn demonstrated good  understanding of the education provided. See EMR under Patient  Instructions for exercises provided during therapy sessions    ASSESSMENT   Caryn is a 52 y.o. female referred to outpatient Physical Therapy with a medical diagnosis of Piriformis syndrome of right side. Patient presents with signs and symptoms consistent with R SIJD with referred R LE sx; minimal concern for progressive neuro decline at this time. Some increased R LE sx as of late nwith increased driving/sitting; however, has made good subjective and objective progress overall, over 5 visits of skilled PT as noted above. Continues to demonstrate gluteal weakness and decreased lumbar flexion/extension range. Should benefit from continued skilled PT to address remaining deficits and meet the goals from evaluation.    Caryn Is progressing well towards her goals.   Pt prognosis is Good.     Pt will continue to benefit from skilled outpatient physical therapy to address the deficits listed in the problem list box on initial evaluation, provide pt/family education and to maximize pt's level of independence in the home and community environment.     Pt's spiritual, cultural and educational needs considered and pt agreeable to plan of care and goals.     Anticipated barriers to physical therapy: none noted    Goals:  Short Term Goals: 4 weeks   1. Patient demonstrates independence with HEP. Met  2. Patient demonstrates independence with Postural Awareness. Met, but continuing to build endurance  3. Patient demonstrates independence with body mechanics. In Progress  4. Patient will report pain of 1/10 at worst, on 0-10 pain scale, with all activity In Progress  5. Patient demonstrates increased lumbar ROM to WFL to improve tolerance to functional activities pain free.   6. Patient demonstrates increased strength BLE's to 4+/5 or greater to improve tolerance to functional activities pain free.           Long Term GOALS: 12 weeks. Pt agrees with goals set.  1. Patient will be able to demonstrate proper sitting posture for at  least 45 minutes in order to decrease likelihood of symptom flare and/or re-injury with driving and computerwork. Met; updated below  2. Patient will note standing/ambulation tolerance of at least 45 minutes for ability to move about community for IADLs such as cooking and grocery shopping.  3. Patient will sleep at reported PLOF. Not met; in progress  4. Patient demonstrates improved overall function per FOTO Lumbar Survey to 20% Limitation or less. In Progress; 33%  5. Patient will be able to demonstrate proper sitting posture for at least 4 hours in order to decrease likelihood of symptom flare and/or re-injury with driving and computerwork. Updated     PLAN   Plan of care Certification: 3/8/2023 to 5/31/23.     Outpatient Physical Therapy 2 times weekly for 12 weeks to include the following interventions: Electrical Stimulation , Gait Training, Manual Therapy, Moist Heat/ Ice, Neuromuscular Re-ed, Therapeutic Activities, Therapeutic Exercise, and Ultrasound.     YARON HAMEED, PT

## 2023-05-27 ENCOUNTER — PATIENT MESSAGE (OUTPATIENT)
Dept: PHYSICAL MEDICINE AND REHAB | Facility: CLINIC | Age: 53
End: 2023-05-27
Payer: COMMERCIAL

## 2023-05-27 DIAGNOSIS — G57.01 PIRIFORMIS SYNDROME OF RIGHT SIDE: Primary | ICD-10-CM

## 2023-05-31 ENCOUNTER — CLINICAL SUPPORT (OUTPATIENT)
Dept: REHABILITATION | Facility: HOSPITAL | Age: 53
End: 2023-05-31
Attending: PHYSICAL MEDICINE & REHABILITATION
Payer: COMMERCIAL

## 2023-05-31 DIAGNOSIS — M54.16 LUMBAR RADICULOPATHY, CHRONIC: ICD-10-CM

## 2023-05-31 DIAGNOSIS — G57.01 PIRIFORMIS SYNDROME OF RIGHT SIDE: ICD-10-CM

## 2023-05-31 PROCEDURE — 97110 THERAPEUTIC EXERCISES: CPT

## 2023-05-31 PROCEDURE — 97161 PT EVAL LOW COMPLEX 20 MIN: CPT

## 2023-05-31 NOTE — PLAN OF CARE
"[facility]  Physical Therapy Initial Evaluation  [place] 1st Floor    Name: Caryn Zarate  Clinic Number: [id number]    Therapy Diagnosis:   Encounter Diagnosis   Name Primary?    Piriformis syndrome of right side      Physician: Virginia Pat, *    Physician Orders: PT Eval and Treat "Back and core strengthening piriformis stretches and glute strengthening and HEP"  Medical Diagnosis from Referral: Piriformis syndrome of right side   Evaluation Date: 5/31/2023  Authorization Period Expiration: 05/29/202305/28/2024   Plan of Care Expiration: 8/31/23  Visit # / Visits authorized: 1/ 1    FOTO: 36%  FOTO 1st follow up:   FOTO 2nd follow up:     Time In: 806 am   Time Out: 900 am  Total Billable Time: 54 minutes    Precautions: Standard    Subjective   Date of onset: 21 years ago   History of current condition - Caryn reports: immediately states she wants to see Shanice upstairs for treatment. She states she started PT in march at [facility] and finished it in the beginning of the month. She wakes up with 10/10 pain on lateral R leg with numbness in flip flop region - it is extreme and wakes her up at night. First started 21 years ago with sciatic pain. Most recent bout worsened after she did a lot of driving during [employer] (she is an [employer] ). She affirms lower back pain. Flexion relieves it. Standing seems to make it worse. When standing she shifts to the left leg. This pain is constant and doesn't shift throughout the day. Right now she states she is holding in her stomach to relieve some of the low back pain.        Past Medical History:   Diagnosis Date    Depression     Fatty liver: see u/s 3/18 3/21/2018    IFG (impaired fasting glucose) 3/18/2016    Obesity     Renal cyst, left: see u/s 3/18 3/21/2018    Sleep apnea 5/26/2014    Vitamin D deficiency disease 3/18/2016     Caryn Zarate  has a past surgical history that includes Laparoscopic hysterectomy (4/14/2009); Tubal " ligation; Hysterectomy;  section; Colonoscopy (N/A, 2018); and Colonoscopy (N/A, 3/24/2022).    Caryn has a current medication list which includes the following prescription(s): duloxetine, meloxicam, methocarbamol, and multivitamin.    Review of patient's allergies indicates:   Allergen Reactions    Latex, natural rubber         Imaging:   XRAY (lumbosacral): Sacroiliac joints appear normal with no erosion or destruction.  Degenerative changes seen at the lumbosacral level.  Vertebral bodies are intact.  The L5-S1 disc space is narrowed with degenerative change bony structures are otherwise intact.     MRI (LUMBAR): No evidence of acute fracture or traumatic listhesis of the lumbar spine.  No cauda equina type lesion in the lumbar spine.  Stable appearance of degenerative changes at the L4-5 and L5-S1 levels with no significant spinal canal or neural foraminal narrowing.  Stable appearance of advanced facet disease at the L4-5 and L5-S1 levels.    Prior Therapy: yes tons   Social History: has daughter    Occupation: [employer]    Prior Level of Function: no pain down leg; on/off low back pain   Current Level of Function: RLE parasthesias with low back    Pain:  Current 7/10, worst 9/10, best 2/10   Location: lumbar and right leg   Description: aching and nubmness   Aggravating Factors: standing  Easing Factors: knees to chest     Pts goals: pain-free    Objective     Lumbar ROM:  Flexion 50% deficit (reach to mid shin)  Extension 75% deficit   R SB 50% deficit worsens numbness in foot   L SB 50%   R/L rot 50% deficit     LE Myotomes Right  Left    L2 Psoas 3/5 4+/5   L2-4 Iliacus  3/5 4+/5   L3 Quadriceps 4/5 4+/5   L3 Adductors 4/5 4+/5   L4 Tibialis Anterior  3/5 4+/5   L4-5 EHL 3/5 4+/5   L5 Gluteus Medius 3/5 3+/5   L5-S1 Peroneals 3/5 4+/5   S1 Hamstrings  4/5 4+/5   S1 Gastroc 3/5 4/5   S2 Gluteus Best 3-/5 3+/5     Special Tests:  (+) R SLR [at 30 deg hip flexion and DF]  (-) Piriformis  "Syndrome  (+) Distal hamstring 90/90  (+) Ely's Test bilaterally   (-) Scour Test  (-) Slump Test       Hip Range of Motion:   Right active Left active    Flexion 90 90   Abduction 40 40   Extension 10 10   Ext. Rotation 22 15   Int. Rotation 15 27       Joint Mobility: R L5-S1 CPA      CMS Impairment/Limitation/Restriction for FOTO LUMBAR Survey    Therapist reviewed FOTO scores for Caryn Zarate on 5/31/2023.   FOTO documents entered into EPIC - see Media section.    Limitation Score: 36%       TREATMENT   Treatment Time In: 835  Treatment Time Out: 900  Total Treatment time separate from Evaluation: 25 minutes    LTR 10x  DKTC 10x5"  Sciatic nerve glide 30x  Prone press ups 30x  Standing and prone quad stretch 3 x 30"    Home Exercises and Patient Education Provided    Education provided about:   - PT POC   - PT goals   - exercises/HEP      Written Home Exercises Provided:   Exercises were reviewed and Caryn was able to demonstrate them prior to the end of the session.   Pt received a written copy of exercises to perform at home. Caryn demonstrated good understanding of the education provided.     See EMR under patient instructions for exercises given.   Assessment   Caryn is a 52 y.o. female referred to outpatient Physical Therapy with a medical diagnosis Piriformis syndrome of right side. Pt presents with significantly decreased lumbar ROM, sciatic neurotension, R SI CPA pain, anterior pelvic rotation, decreased myotome strength in RLE.     Pt prognosis is Good.   Pt will benefit from skilled outpatient Physical Therapy to address the deficits stated above and in the chart below, provide pt/family education, and to maximize pt's level of independence.     Plan of care discussed with patient: Yes  Pt's spiritual, cultural and educational needs considered and patient is agreeable to the plan of care and goals as stated below:     Anticipated Barriers for therapy: NONE    Medical Necessity is demonstrated by the " following  History  Co-morbidities and personal factors that may impact the plan of care Co-morbidities:   High BMI  HLD  IFG  Sleep apnea    Personal Factors:   no deficits     MOD   Examination  Body Structures and Functions, activity limitations and participation restrictions that may impact the plan of care Body Regions:   back  lower extremities    Body Systems:    gross symmetry  ROM  strength  gross coordinated movement  balance  gait  transfers  transitions  motor control  motor learning    Participation Restrictions:   None identified    Activity limitations:   no deficits    General Tasks and Commands  no deficits    Communication  no deficits    Mobility  no deficits    Self care  no deficits    Domestic Life  no deficits    Interactions/Relationships  no deficits    Life Areas  no deficits    Community and Social Life  no deficits         Low   Clinical Presentation stable and uncomplicated Low   Decision Making/ Complexity Score: Low     Goals:  Short Term Goals: 4 weeks   1. Independent with HEP  2. Increase pain-free lumbar ROM by 5 degrees   3. Demonstrate back safe bending and lifting form Independently   4. Report decreased Lumbar pain < 9/10  to increase tolerance for ADLS     Long Term Goals: 8 weeks   1. Increase pain-free B lumbar sidebending ROM to 35 degrees   2. Increase pain-free lumbar flexion ROM to 35 degrees  3. Increase pain-free lumbar extension ROM to 20 degrees  4. Increase strength of BLE and erector spinae to 4+/5 pain-free  5. Able to bend and lift up 10lb using back safe technique without pain  6. Report decreased Lumbar pain < 2/10  to increase tolerance for ADLs        Plan     Plan of care Certification: 5/31/2023 to 8/31/23    Outpatient Physical Therapy 1 times weekly for 8 weeks to include the following interventions: Gait Training, Manual Therapy, Moist Heat/ Ice, Neuromuscular Re-ed, Patient Education, Therapeutic Activites, Therapeutic Exercise, and Functional Dry  Needling with/or without Electrical Stimulation as needed.     Clare Smith, PT, DPT

## 2023-06-06 ENCOUNTER — CLINICAL SUPPORT (OUTPATIENT)
Dept: REHABILITATION | Facility: HOSPITAL | Age: 53
End: 2023-06-06
Payer: COMMERCIAL

## 2023-06-06 DIAGNOSIS — M54.16 LUMBAR RADICULOPATHY, CHRONIC: Primary | ICD-10-CM

## 2023-06-06 PROCEDURE — 97112 NEUROMUSCULAR REEDUCATION: CPT

## 2023-06-06 PROCEDURE — 97110 THERAPEUTIC EXERCISES: CPT

## 2023-06-06 NOTE — PROGRESS NOTES
"[facility]   Physical Therapy Treatment Note      Name: Caryn Zarate  [facility] Number: [id number]    Therapy Diagnosis:   Encounter Diagnosis   Name Primary?    Lumbar radiculopathy, chronic Yes     Physician: No ref. provider found    Visit Date: 6/6/2023    Physician Orders: PT Eval and Treat "Back and core strengthening piriformis stretches and glute strengthening and HEP"  Medical Diagnosis from Referral: Piriformis syndrome of right side   Evaluation Date: 5/31/2023  Authorization Period Expiration: 05/29/2023 - 05/28/2024   Plan of Care Expiration: 8/31/23  Visit # / Visits authorized: 1 / 20     FOTO: 36%  FOTO 1st follow up:   FOTO 2nd follow up:      Time In: 113 pm   Time Out: 200 pm  Total Billable Time: 47 minutes    PTA Visit #: 0/5     Subjective     Pt reports: feels better back pain improved, no longer has shin pain only having numbness in 1-3 toes on right.  She was compliant with home exercise program.  Response to previous treatment: improved  Functional change: decreased pain in shin     Pain: 4/10  Location: right 1-3rd toe numbness    Objective      Objective Measures updated at progress report unless specified.     Treatment     Caryn received the treatments listed below:      Caryn received therapeutic exercises to develop strength, endurance, ROM, flexibility, posture and core stabilization for 24 minutes including:     LTR 30x  B Sciatic nerve glide 2 x 30  Prone press ups 4 x 10  B Prone quad stretch 3 x 30"    Caryn participated in neuromuscular re-education activities to improve: Balance, Coordination, Kinesthetic, Sense, Proprioception and Posture for 23 minutes. The following activities were included:  PPT + TrA 5" x 30   Fallout x 30 March x 30      Patient Education and Home Exercises       Education provided:   - continue HEP   - add core stability to HEP    Written Home Exercises Provided: Patient instructed to cont prior HEP. Exercises were reviewed and Caryn " was able to demonstrate them prior to the end of the session.  Caryn demonstrated good  understanding of the education provided. See EMR under Patient Instructions for exercises provided during therapy sessions    Assessment     Pt presents with lumbar pain improved, pain in shin eliminated, and now only has numbness in 1st-3rd in foot. Toe numbness was eliminated with prone press ups. Patient ended session with no symptoms. Updated HEP to include core stability.     Caryn Is progressing well towards her goals.   Pt prognosis is Good.     Pt will continue to benefit from skilled outpatient physical therapy to address the deficits listed in the problem list box on initial evaluation, provide pt/family education and to maximize pt's level of independence in the home and community environment.     Pt's spiritual, cultural and educational needs considered and pt agreeable to plan of care and goals.     Anticipated barriers to physical therapy: none    Goals:   Short Term Goals: 4 weeks   1. Independent with HEP  2. Increase pain-free lumbar ROM by 5 degrees   3. Demonstrate back safe bending and lifting form Independently   4. Report decreased Lumbar pain < 9/10  to increase tolerance for ADLS      Long Term Goals: 8 weeks   1. Increase pain-free B lumbar sidebending ROM to 35 degrees   2. Increase pain-free lumbar flexion ROM to 35 degrees  3. Increase pain-free lumbar extension ROM to 20 degrees  4. Increase strength of BLE and erector spinae to 4+/5 pain-free  5. Able to bend and lift up 10lb using back safe technique without pain  6. Report decreased Lumbar pain < 2/10  to increase tolerance for ADLs    Plan     Continue with PT HANH Smith, PT

## 2023-06-20 ENCOUNTER — CLINICAL SUPPORT (OUTPATIENT)
Dept: REHABILITATION | Facility: HOSPITAL | Age: 53
End: 2023-06-20
Payer: COMMERCIAL

## 2023-06-20 DIAGNOSIS — M54.16 LUMBAR RADICULOPATHY, CHRONIC: Primary | ICD-10-CM

## 2023-06-20 PROCEDURE — 97110 THERAPEUTIC EXERCISES: CPT

## 2023-06-20 PROCEDURE — 97112 NEUROMUSCULAR REEDUCATION: CPT

## 2023-06-20 NOTE — PROGRESS NOTES
"[facility]   Physical Therapy Treatment Note      Name: Caryn Zarate  [facility] Number: [id number]    Therapy Diagnosis:   Encounter Diagnosis   Name Primary?    Lumbar radiculopathy, chronic Yes     Physician: Virginia Pat, *    Visit Date: 6/20/2023    Physician Orders: PT Eval and Treat "Back and core strengthening piriformis stretches and glute strengthening and HEP"  Medical Diagnosis from Referral: Piriformis syndrome of right side   Evaluation Date: 5/31/2023  Authorization Period Expiration: 05/29/2023 - 05/28/2024   Plan of Care Expiration: 8/31/23  Visit # / Visits authorized: 7 / 20     FOTO: 36%  FOTO 1st follow up: 99%  FOTO 2nd follow up: 99%     Time In: 100 pm   Time Out: 155 pm  Total Billable Time: 55 minutes    PTA Visit #: 0/5     Subjective     Pt reports: no pain pretty much better, occasionally feels numbness between 1st and 2nd toe     She was compliant with home exercise program.  Response to previous treatment: improved  Functional change: decreased pain in shin     Pain: 0 / 10  Location: right 1-3rd toe numbness    Objective      Objective Measures updated at progress report unless specified.     Treatment     Caryn received the treatments listed below:      Caryn received therapeutic exercises to develop strength, endurance, ROM, flexibility, posture and core stabilization for 25 minutes including:     LTR 30x  B Sciatic nerve glide 2 x 30  Prone press ups 4 x 10  B Prone quad stretch 3 x 30"      Caryn participated in neuromuscular re-education activities to improve: Balance, Coordination, Kinesthetic, Sense, Proprioception and Posture for 30 minutes. The following activities were included:  PPT + TrA 5" x 30   Fallout with UE 3 x 20   March 3 x 20   Dead bug LE extended 3 x 20   Dead bug legs up 3 x 20    Patient Education and Home Exercises       Education provided:   - continue HEP   - add core stability to HEP    Written Home Exercises Provided: Patient " instructed to cont prior HEP. Exercises were reviewed and Caryn was able to demonstrate them prior to the end of the session.  Caryn demonstrated good  understanding of the education provided. See EMR under Patient Instructions for exercises provided during therapy sessions    Assessment     Pt presents with 0/10 pain and subjective reports of resolved symptoms. Pt agreeable to discharge today with continuation of HEP.      Caryn Is progressing well towards her goals.   Pt prognosis is Good.     Pt will continue to benefit from skilled outpatient physical therapy to address the deficits listed in the problem list box on initial evaluation, provide pt/family education and to maximize pt's level of independence in the home and community environment.     Pt's spiritual, cultural and educational needs considered and pt agreeable to plan of care and goals.     Anticipated barriers to physical therapy: none    Goals:   Short Term Goals: 4 weeks - MET   1. Independent with HEP  2. Increase pain-free lumbar ROM by 5 degrees   3. Demonstrate back safe bending and lifting form Independently   4. Report decreased Lumbar pain < 9/10  to increase tolerance for ADLS      Long Term Goals: 8 weeks - MET   1. Increase pain-free B lumbar sidebending ROM to 35 degrees   2. Increase pain-free lumbar flexion ROM to 35 degrees  3. Increase pain-free lumbar extension ROM to 20 degrees  4. Increase strength of BLE and erector spinae to 4+/5 pain-free  5. Able to bend and lift up 10lb using back safe technique without pain  6. Report decreased Lumbar pain < 2/10  to increase tolerance for ADLs    Plan     Discharge from PT    Clare Smith PT

## 2023-06-20 NOTE — PLAN OF CARE
"[facility]   Physical Therapy Treatment Note   DISCHARGE     Name: Caryn Zarate  Clinic Number: [id number]    Therapy Diagnosis:   Encounter Diagnosis   Name Primary?    Lumbar radiculopathy, chronic Yes     Physician: Virginia Pat, *    Visit Date: 6/20/2023    Physician Orders: PT Eval and Treat "Back and core strengthening piriformis stretches and glute strengthening and HEP"  Medical Diagnosis from Referral: Piriformis syndrome of right side   Evaluation Date: 5/31/2023  Authorization Period Expiration: 05/29/2023 - 05/28/2024   Plan of Care Expiration: 8/31/23  Visit # / Visits authorized: 7 / 20     FOTO: 36%  FOTO 1st follow up: 99%  FOTO 2nd follow up: 99%     Time In: 100 pm   Time Out: 155 pm  Total Billable Time: 55 minutes    PTA Visit #: 0/5     Subjective     Pt reports: no pain pretty much better, occasionally feels numbness between 1st and 2nd toe     She was compliant with home exercise program.  Response to previous treatment: improved  Functional change: decreased pain in shin     Pain: 0 / 10  Location: right 1-3rd toe numbness    Objective      Objective Measures updated at progress report unless specified.     Treatment     Caryn received the treatments listed below:      Caryn received therapeutic exercises to develop strength, endurance, ROM, flexibility, posture and core stabilization for 25 minutes including:     LTR 30x  B Sciatic nerve glide 2 x 30  Prone press ups 4 x 10  B Prone quad stretch 3 x 30"      Caryn participated in neuromuscular re-education activities to improve: Balance, Coordination, Kinesthetic, Sense, Proprioception and Posture for 30 minutes. The following activities were included:  PPT + TrA 5" x 30   Fallout with UE 3 x 20   March 3 x 20   Dead bug LE extended 3 x 20   Dead bug legs up 3 x 20    Patient Education and Home Exercises       Education provided:   - continue HEP   - add core stability to HEP    Written Home Exercises " Provided: Patient instructed to cont prior HEP. Exercises were reviewed and Caryn was able to demonstrate them prior to the end of the session.  Caryn demonstrated good  understanding of the education provided. See EMR under Patient Instructions for exercises provided during therapy sessions    Assessment     Pt presents with 0/10 pain and subjective reports of resolved symptoms. Pt agreeable to discharge today with continuation of HEP.      Caryn Is progressing well towards her goals.   Pt prognosis is Good.     Pt will continue to benefit from skilled outpatient physical therapy to address the deficits listed in the problem list box on initial evaluation, provide pt/family education and to maximize pt's level of independence in the home and community environment.     Pt's spiritual, cultural and educational needs considered and pt agreeable to plan of care and goals.     Anticipated barriers to physical therapy: none    Goals:   Short Term Goals: 4 weeks - MET   1. Independent with HEP  2. Increase pain-free lumbar ROM by 5 degrees   3. Demonstrate back safe bending and lifting form Independently   4. Report decreased Lumbar pain < 9/10  to increase tolerance for ADLS      Long Term Goals: 8 weeks - MET   1. Increase pain-free B lumbar sidebending ROM to 35 degrees   2. Increase pain-free lumbar flexion ROM to 35 degrees  3. Increase pain-free lumbar extension ROM to 20 degrees  4. Increase strength of BLE and erector spinae to 4+/5 pain-free  5. Able to bend and lift up 10lb using back safe technique without pain  6. Report decreased Lumbar pain < 2/10  to increase tolerance for ADLs    Plan     Discharge from PT    Clare Smith PT

## 2023-09-08 ENCOUNTER — OFFICE VISIT (OUTPATIENT)
Dept: SPINE | Facility: CLINIC | Age: 53
End: 2023-09-08
Attending: PHYSICAL MEDICINE & REHABILITATION
Payer: COMMERCIAL

## 2023-09-08 DIAGNOSIS — M54.16 LUMBAR RADICULOPATHY, CHRONIC: Primary | ICD-10-CM

## 2023-09-08 PROCEDURE — 1159F MED LIST DOCD IN RCRD: CPT | Mod: CPTII,95,, | Performed by: PHYSICAL MEDICINE & REHABILITATION

## 2023-09-08 PROCEDURE — 1160F RVW MEDS BY RX/DR IN RCRD: CPT | Mod: CPTII,95,, | Performed by: PHYSICAL MEDICINE & REHABILITATION

## 2023-09-08 PROCEDURE — 99213 PR OFFICE/OUTPT VISIT, EST, LEVL III, 20-29 MIN: ICD-10-PCS | Mod: 95,,, | Performed by: PHYSICAL MEDICINE & REHABILITATION

## 2023-09-08 PROCEDURE — 1159F PR MEDICATION LIST DOCUMENTED IN MEDICAL RECORD: ICD-10-PCS | Mod: CPTII,95,, | Performed by: PHYSICAL MEDICINE & REHABILITATION

## 2023-09-08 PROCEDURE — 1160F PR REVIEW ALL MEDS BY PRESCRIBER/CLIN PHARMACIST DOCUMENTED: ICD-10-PCS | Mod: CPTII,95,, | Performed by: PHYSICAL MEDICINE & REHABILITATION

## 2023-09-08 PROCEDURE — 99213 OFFICE O/P EST LOW 20 MIN: CPT | Mod: 95,,, | Performed by: PHYSICAL MEDICINE & REHABILITATION

## 2023-09-08 NOTE — PROGRESS NOTES
Subjective:      Patient ID: Caryn Zarate is a 53 y.o. female.    Chief Complaint: No chief complaint on file.    The patient location is: [place]  The chief complaint leading to consultation is: follow up    Visit type: audiovisual    Face to Face time with patient: 14 min  20 minutes of total time spent on the encounter, which includes face to face time and non-face to face time preparing to see the patient (eg, review of tests), Obtaining and/or reviewing separately obtained history, Documenting clinical information in the electronic or other health record, Independently interpreting results (not separately reported) and communicating results to the patient/family/caregiver, or Care coordination (not separately reported).         Each patient to whom he or she provides medical services by telemedicine is:  (1) informed of the relationship between the physician and patient and the respective role of any other health care provider with respect to management of the patient; and (2) notified that he or she may decline to receive medical services by telemedicine and may withdraw from such care at any time.    Notes:     Ms zarate is a 54 yo female here for follow up of low back pain.  She was last seen by me 3/7/2023  and had been having back pain for the past 6 weeks after moving some pavers.  She was sent to PT.  She was on nsaid and robaxin.  She was feeling better at last PT visit in June.  She was doing better, and then 2 weeks ago she started having right foot numbness on top of the foot, then the pain goes up to her calf.  She has pain in the calf and the right buttock with standing and walking.  The numbness and tingling is all the time.  The numbness can wake her up at night between first second and third toe.  She had stopped her hep.  She does still have exercises on her phone.  She will restart them.  She has restarted robaxin and mobic 2 weeks ago.  She is taking muscle relaxer twice a day.  The pain is  better with sitting    MRI lumbar 7/11/2022  The lumbar alignment is within normal limits.  There are 5 lumbar type vertebral body.  The vertebral body heights are maintained.     The conus terminates at the level of L1.  The cauda equina nerve roots are within normal limits.     There is stable appearance of disc desiccation at the L4-5 and L5-S1 levels.  There is stable appearance of the advanced endplate changes at the L5-S1 level.  Evaluation of the individual disc levels reveals the following:     L1-L2, normal.     L2-L3, normal     L3-L4, there is facet hypertrophy and ligamentum flavum hypertrophy.  The spinal canal and neural foramina are unremarkable.     L4-5, there is diffuse disc bulge along with facet hypertrophy and ligamentum flavum hypertrophy.  There is small amount of fluid within the facet joints.  The spinal canal is within normal limits.  The neural foramina is unremarkable.  There is mild narrowing the lateral recesses.     L5-S1, there is diffuse disc bulge along with facet hypertrophy and ligamentum flavum hypertrophy.  There is unchanged appearance of central disc protrusion.  The spinal canal is within normal limits.  There is mild bilateral neural foraminal narrowing.  There is mild bilateral narrowing the lateral recesses.     There is a stable simple cyst in the left kidney.  The remainder of the retroperitoneal structures are within normal limits.     Impression:     No evidence of acute fracture or traumatic listhesis of the lumbar spine.     No cauda equina type lesion in the lumbar spine.     Stable appearance of degenerative changes at the L4-5 and L5-S1 levels with no significant spinal canal or neural foraminal narrowing.     Stable appearance of advanced facet disease at the L4-5 and L5-S1 levels.    X-ray lumbar 7/7/2022  Vertebral bodies are intact.  The L5-S1 disc space is narrowed with degenerative change bony structures are otherwise intact.     Impression:     See  above    X-ray si joint 2022  Sacroiliac joints appear normal with no erosion or destruction.  Degenerative changes seen at the lumbosacral level.     Impression:     See above    Past Medical History:  No date: Depression  3/21/2018: Fatty liver: see u/s 3/18  3/18/2016: IFG (impaired fasting glucose)  No date: Obesity  3/21/2018: Renal cyst, left: see u/s 3/18  2014: Sleep apnea  3/18/2016: Vitamin D deficiency disease    Past Surgical History:  No date:  SECTION  2018: COLONOSCOPY; N/A      Comment:  Procedure: COLONOSCOPY;  Surgeon: Milton Park MD;                 Location: [facility] ENDO (4TH FLR);  Service: Endoscopy;                 Laterality: N/A;  3/24/2022: COLONOSCOPY; N/A      Comment:  Procedure: COLONOSCOPY;  Surgeon: oDe Rebolledo MD;  Location: [facility] ENDO ([facility] FLR);  Service: Endoscopy;                Laterality: N/A;  Covid test on 3/21 at [email or web address]                Fully Vaccinated.[facility]  No date: HYSTERECTOMY      Comment:  ovaries remain  2009: LAPAROSCOPIC HYSTERECTOMY      Comment:  Haris Dahl, for uterine fibroids  No date: TUBAL LIGATION    Review of patient's family history indicates:  Problem: Hypertension      Relation: Mother          Age of Onset: (Not Specified)  Problem: Diabetes      Relation: Father          Age of Onset: (Not Specified)  Problem: Kidney disease      Relation: Father          Age of Onset: (Not Specified)          Comment: Dialysis  Problem: Hypertension      Relation: Father          Age of Onset: (Not Specified)  Problem: Cancer      Relation: Maternal Grandmother          Age of Onset: (Not Specified)          Comment: breast  Problem: Breast cancer      Relation: Maternal Grandmother          Age of Onset: (Not Specified)  Problem: No Known Problems      Relation: Daughter          Age of Onset: (Not Specified)  Problem: No Known Problems      Relation: Daughter          Age of Onset: (Not Specified)  Problem: Breast  cancer      Relation: Unknown          Age of Onset: (Not Specified)  Problem: Colon cancer      Relation: Neg Hx          Age of Onset: (Not Specified)  Problem: Ovarian cancer      Relation: Neg Hx          Age of Onset: (Not Specified)  Problem: Melanoma      Relation: Neg Hx          Age of Onset: (Not Specified)  Problem: Psoriasis      Relation: Neg Hx          Age of Onset: (Not Specified)  Problem: Lupus      Relation: Neg Hx          Age of Onset: (Not Specified)  Problem: Cirrhosis      Relation: Neg Hx          Age of Onset: (Not Specified)      Social History    Socioeconomic History      Marital status:       Number of children: 2    Occupational History        Employer: [employer]    Tobacco Use      Smoking status: Never Smoker      Smokeless tobacco: Never Used    Substance and Sexual Activity      Alcohol use: Yes        Comment: rarely      Drug use: No      Sexual activity: Yes        Partners: Male        Birth control/protection: Surgical      Current Outpatient Medications:  DULoxetine (CYMBALTA) 60 MG capsule, Take 1 capsule (60 mg total) by mouth once daily. AS NEED IT, Disp: 30 capsule, Rfl: 3  methylPREDNISolone (MEDROL, DARWIN,) 4 mg tablet, use as directed, Disp: 1 each, Rfl: 0  multivitamin (THERAGRAN) per tablet, Take 1 tablet by mouth once daily., Disp: , Rfl:   polyethylene glycol (GOLYTELY) 236-22.74-6.74 -5.86 gram suspension, TAKE 4000 ML BY MOUTH 1 TIME FOR 1 DOSE, Disp: , Rfl:     No current facility-administered medications for this visit.      Review of patient's allergies indicates:   -- Latex, natural rubber           Review of Systems   Constitutional: Negative for weight gain and weight loss.   Cardiovascular:  Negative for chest pain.   Respiratory:  Negative for shortness of breath.    Musculoskeletal:  Positive for back pain (right buttock and calf). Negative for joint pain and joint swelling.   Gastrointestinal:  Negative for abdominal pain, bowel  incontinence, nausea and vomiting.   Genitourinary:  Negative for bladder incontinence.   Neurological:  Positive for numbness (right foot comes and goes). Negative for paresthesias.         Objective:        General: Caryn is well-developed, well-nourished, appears stated age, in no acute distress, alert and oriented to time, place and person.     General    Constitutional: She is oriented to person, place, and time. She appears well-developed and well-nourished.   HENT:   Head: Normocephalic and atraumatic.   Pulmonary/Chest: Effort normal.   Neurological: She is alert and oriented to person, place, and time.   Psychiatric: She has a normal mood and affect. Her behavior is normal. Judgment and thought content normal.             virtual          Assessment:       1. Lumbar radiculopathy, chronic             Plan:            She had a flare up of pain last 2 weeks.  Right glute and right outer calf and to the top of the foot.  The pin is with standing and walking and does go away.  The numbness on top of the foot also goes away  We discussed posture sitting and the importance of trying to sit better.  And take standing breaks.  She is not driving right now for [employer].  She is having more pain standing and walking  We discussed the benefits of therapy and exercise and continuing to move.  She did well with PT, but has not continued her HEP.  We discussed important to keep doing this.  She will restart her HEP.  We discussed going back to PT.  She will let us know and we can send her  to Physical therapy, for ITB stretches and piriformis stretches glute strengthening, back and core strengthening at [place]  We discussed the importance of exercise for our health.  We discussed the cardiovascular recommendations for walking which is 150 minutes a week.  We discussed that good for health in general. We discussed that you can break the time up into at minimum 10 minute increments and do multiple times a day, but goal in 10  min is to have sustained walking with increase in heart rate.  She will start with 5 min and increase  mobic 15mg po Qday she restarted.  We discussed a medrol dose benjamin, but will wait for now  Continue robaxin 500mg po QID as needed she restarted BID  RTC PRN, she will send a message and let us know how she is doing          Follow-up: No follow-ups on file. If there are any questions prior to this, the patient was instructed to contact the office.

## 2023-10-06 ENCOUNTER — TELEPHONE (OUTPATIENT)
Dept: INTERNAL MEDICINE | Facility: CLINIC | Age: 53
End: 2023-10-06
Payer: COMMERCIAL

## 2023-10-06 DIAGNOSIS — F33.41 RECURRENT MAJOR DEPRESSIVE DISORDER, IN PARTIAL REMISSION: ICD-10-CM

## 2023-10-06 DIAGNOSIS — Z00.00 ANNUAL PHYSICAL EXAM: Primary | ICD-10-CM

## 2023-10-06 NOTE — TELEPHONE ENCOUNTER
No care due was identified.  [facility] Embedded Care Due Messages. Reference number: [id number].   10/06/2023 2:59:37 PM CDT

## 2023-10-06 NOTE — TELEPHONE ENCOUNTER
Refill Routing Note   Medication(s) are not appropriate for processing by [facility] for the following reason(s):      Required labs outdated  Required vitals outdated    [facility] action(s):  Defer Care Due:  None identified              Appointments  past 12m or future 3m with PCP    Date Provider   Last Visit   7/19/2022 Layla Laws MD   Next Visit   12/14/2023 Layla Laws MD   ED visits in past 90 days: 0        Note composed:3:28 PM 10/06/2023           Interval Events:  Patient seen and examined at bedside. The patient noted a significant amount of pain from her chest tube insertion site. She was to go down to endoscopy earlier for a pleurX cathter placement however she was reluctant. The options of how to go forward were reviewed with her in detail and noted she would like some time to discuss her options with her significant other.     MEDICATIONS:  Pulmonary:  budesonide 160 MICROgram(s)/formoterol 4.5 MICROgram(s) Inhaler 2 Puff(s) Inhalation two times a day    Antimicrobials:    Anticoagulants:    Cardiac:  furosemide   Injectable 40 milliGRAM(s) IV Push daily  metoprolol tartrate 50 milliGRAM(s) Oral two times a day    Endocrine:  dextrose 40% Gel 15 Gram(s) Oral once PRN  dextrose 50% Injectable 12.5 Gram(s) IV Push once  dextrose 50% Injectable 25 Gram(s) IV Push once  dextrose 50% Injectable 25 Gram(s) IV Push once  glucagon  Injectable 1 milliGRAM(s) IntraMuscular once PRN  insulin lispro (HumaLOG) corrective regimen sliding scale   SubCutaneous Before meals and at bedtime    Allergies    No Known Drug Allergies  shellfish (Hives)    Intolerances        Vital Signs Last 24 Hrs  T(C): 36.8 (23 Dec 2019 04:57), Max: 36.8 (22 Dec 2019 17:46)  T(F): 98.3 (23 Dec 2019 04:57), Max: 98.3 (23 Dec 2019 04:57)  HR: 108 (23 Dec 2019 09:00) (74 - 112)  BP: 123/79 (23 Dec 2019 09:00) (108/54 - 133/65)  BP(mean): 95 (23 Dec 2019 09:00) (78 - 95)  RR: 18 (23 Dec 2019 09:00) (16 - 18)  SpO2: 99% (23 Dec 2019 09:00) (95% - 99%)      PHYSICAL EXAM:  Constitutional: WDWN  HEENT: NC/AT; PERRL, anicteric sclera; MMM  Neck: supple  Cardiovascular: +S1/S2, RRR  Respiratory: decreased breath sounds right side.   Gastrointestinal: soft, NT/ND; +BSx4  Extremities: WWP; no edema, clubbing or cyanosis  Vascular: 2+ radial, DP/PT pulses B/L  Neurological: AAOx3; no focal deficits    12- @ 07:01  -  12- @ 07:00  --------------------------------------------------------  IN: 410 mL / OUT: 650 mL / NET: -240 mL     @ 07: @ 10:26  --------------------------------------------------------  IN: 0 mL / OUT: 200 mL / NET: -200 mL          LABS:      CBC Full  -  ( 23 Dec 2019 06:37 )  WBC Count : 12.33 K/uL  RBC Count : 4.33 M/uL  Hemoglobin : 8.8 g/dL  Hematocrit : 29.9 %  Platelet Count - Automated : 196 K/uL  Mean Cell Volume : 69.1 fl  Mean Cell Hemoglobin : 20.3 pg  Mean Cell Hemoglobin Concentration : 29.4 gm/dL  Auto Neutrophil # : x  Auto Lymphocyte # : x  Auto Monocyte # : x  Auto Eosinophil # : x  Auto Basophil # : x  Auto Neutrophil % : x  Auto Lymphocyte % : x  Auto Monocyte % : x  Auto Eosinophil % : x  Auto Basophil % : x        132<L>  |  92<L>  |  33<H>  ----------------------------<  156<H>  4.0   |  23  |  1.28    Ca    8.9      23 Dec 2019 06:37  Phos  3.1       Mg     2.2         TPro  6.0  /  Alb  3.4  /  TBili  2.0<H>  /  DBili  0.8<H>  /  AST  845<H>  /  ALT  1073<H>  /  AlkPhos  137<H>      PT/INR - ( 23 Dec 2019 06:37 )   PT: 29.4 sec;   INR: 2.53          PTT - ( 23 Dec 2019 06:37 )  PTT:29.1 sec      Urinalysis Basic - ( 22 Dec 2019 18:21 )    Color: Yellow / Appearance: Cloudy / S.025 / pH: x  Gluc: x / Ketone: NEGATIVE  / Bili: Negative / Urobili: 0.2 E.U./dL   Blood: x / Protein: NEGATIVE mg/dL / Nitrite: NEGATIVE   Leuk Esterase: Small / RBC: 5-10 /HPF / WBC > 10 /HPF   Sq Epi: x / Non Sq Epi: 5-10 /HPF / Bacteria: Many /HPF                RADIOLOGY & ADDITIONAL STUDIES (The following images were personally reviewed):  Chest X-Ray

## 2023-10-10 RX ORDER — DULOXETIN HYDROCHLORIDE 60 MG/1
CAPSULE, DELAYED RELEASE ORAL
Qty: 90 CAPSULE | Refills: 2 | Status: SHIPPED | OUTPATIENT
Start: 2023-10-10

## 2023-12-07 ENCOUNTER — LAB VISIT (OUTPATIENT)
Dept: LAB | Facility: HOSPITAL | Age: 53
End: 2023-12-07
Attending: INTERNAL MEDICINE
Payer: COMMERCIAL

## 2023-12-07 DIAGNOSIS — Z00.00 ANNUAL PHYSICAL EXAM: ICD-10-CM

## 2023-12-07 LAB
25(OH)D3+25(OH)D2 SERPL-MCNC: 23 NG/ML (ref 30–96)
ALBUMIN SERPL BCP-MCNC: 3.7 G/DL (ref 3.5–5.2)
ALP SERPL-CCNC: 75 U/L (ref 55–135)
ALT SERPL W/O P-5'-P-CCNC: 26 U/L (ref 10–44)
ANION GAP SERPL CALC-SCNC: 11 MMOL/L (ref 8–16)
AST SERPL-CCNC: 25 U/L (ref 10–40)
BASOPHILS # BLD AUTO: 0.03 K/UL (ref 0–0.2)
BASOPHILS NFR BLD: 0.6 % (ref 0–1.9)
BILIRUB SERPL-MCNC: 0.6 MG/DL (ref 0.1–1)
BUN SERPL-MCNC: 12 MG/DL (ref 6–20)
CALCIUM SERPL-MCNC: 9.3 MG/DL (ref 8.7–10.5)
CHLORIDE SERPL-SCNC: 103 MMOL/L (ref 95–110)
CHOLEST SERPL-MCNC: 225 MG/DL (ref 120–199)
CHOLEST/HDLC SERPL: 3.2 {RATIO} (ref 2–5)
CO2 SERPL-SCNC: 26 MMOL/L (ref 23–29)
CREAT SERPL-MCNC: 0.9 MG/DL (ref 0.5–1.4)
DIFFERENTIAL METHOD: NORMAL
EOSINOPHIL # BLD AUTO: 0.2 K/UL (ref 0–0.5)
EOSINOPHIL NFR BLD: 4.8 % (ref 0–8)
ERYTHROCYTE [DISTWIDTH] IN BLOOD BY AUTOMATED COUNT: 12.4 % (ref 11.5–14.5)
EST. GFR  (NO RACE VARIABLE): >60 ML/MIN/1.73 M^2
ESTIMATED AVG GLUCOSE: 134 MG/DL (ref 68–131)
GLUCOSE SERPL-MCNC: 113 MG/DL (ref 70–110)
HBA1C MFR BLD: 6.3 % (ref 4–5.6)
HCT VFR BLD AUTO: 39.2 % (ref 37–48.5)
HDLC SERPL-MCNC: 71 MG/DL (ref 40–75)
HDLC SERPL: 31.6 % (ref 20–50)
HGB BLD-MCNC: 13.9 G/DL (ref 12–16)
IMM GRANULOCYTES # BLD AUTO: 0.02 K/UL (ref 0–0.04)
IMM GRANULOCYTES NFR BLD AUTO: 0.4 % (ref 0–0.5)
LDLC SERPL CALC-MCNC: 137.4 MG/DL (ref 63–159)
LYMPHOCYTES # BLD AUTO: 1.7 K/UL (ref 1–4.8)
LYMPHOCYTES NFR BLD: 35.6 % (ref 18–48)
MCH RBC QN AUTO: 30.8 PG (ref 27–31)
MCHC RBC AUTO-ENTMCNC: 35.5 G/DL (ref 32–36)
MCV RBC AUTO: 87 FL (ref 82–98)
MONOCYTES # BLD AUTO: 0.3 K/UL (ref 0.3–1)
MONOCYTES NFR BLD: 6.5 % (ref 4–15)
NEUTROPHILS # BLD AUTO: 2.5 K/UL (ref 1.8–7.7)
NEUTROPHILS NFR BLD: 52.1 % (ref 38–73)
NONHDLC SERPL-MCNC: 154 MG/DL
NRBC BLD-RTO: 0 /100 WBC
PLATELET # BLD AUTO: 255 K/UL (ref 150–450)
PMV BLD AUTO: 9.3 FL (ref 9.2–12.9)
POTASSIUM SERPL-SCNC: 4.1 MMOL/L (ref 3.5–5.1)
PROT SERPL-MCNC: 6.6 G/DL (ref 6–8.4)
RBC # BLD AUTO: 4.51 M/UL (ref 4–5.4)
SODIUM SERPL-SCNC: 140 MMOL/L (ref 136–145)
TRIGL SERPL-MCNC: 83 MG/DL (ref 30–150)
TSH SERPL DL<=0.005 MIU/L-ACNC: 1.77 UIU/ML (ref 0.4–4)
VIT B12 SERPL-MCNC: 618 PG/ML (ref 210–950)
WBC # BLD AUTO: 4.77 K/UL (ref 3.9–12.7)

## 2023-12-07 PROCEDURE — 36415 COLL VENOUS BLD VENIPUNCTURE: CPT | Performed by: INTERNAL MEDICINE

## 2023-12-07 PROCEDURE — 85025 COMPLETE CBC W/AUTO DIFF WBC: CPT | Performed by: INTERNAL MEDICINE

## 2023-12-07 PROCEDURE — 82607 VITAMIN B-12: CPT | Performed by: INTERNAL MEDICINE

## 2023-12-07 PROCEDURE — 80061 LIPID PANEL: CPT | Performed by: INTERNAL MEDICINE

## 2023-12-07 PROCEDURE — 84443 ASSAY THYROID STIM HORMONE: CPT | Performed by: INTERNAL MEDICINE

## 2023-12-07 PROCEDURE — 83036 HEMOGLOBIN GLYCOSYLATED A1C: CPT | Performed by: INTERNAL MEDICINE

## 2023-12-07 PROCEDURE — 80053 COMPREHEN METABOLIC PANEL: CPT | Performed by: INTERNAL MEDICINE

## 2023-12-07 PROCEDURE — 82306 VITAMIN D 25 HYDROXY: CPT | Performed by: INTERNAL MEDICINE

## 2024-01-24 ENCOUNTER — TELEPHONE (OUTPATIENT)
Dept: INTERNAL MEDICINE | Facility: CLINIC | Age: 54
End: 2024-01-24
Payer: COMMERCIAL

## 2024-03-08 ENCOUNTER — OFFICE VISIT (OUTPATIENT)
Dept: INTERNAL MEDICINE | Facility: CLINIC | Age: 54
End: 2024-03-08
Payer: COMMERCIAL

## 2024-03-08 VITALS
DIASTOLIC BLOOD PRESSURE: 70 MMHG | HEIGHT: 70 IN | BODY MASS INDEX: 36.22 KG/M2 | WEIGHT: 253 LBS | SYSTOLIC BLOOD PRESSURE: 125 MMHG

## 2024-03-08 DIAGNOSIS — Z00.00 ANNUAL PHYSICAL EXAM: Primary | ICD-10-CM

## 2024-03-08 DIAGNOSIS — Z12.31 SCREENING MAMMOGRAM, ENCOUNTER FOR: ICD-10-CM

## 2024-03-08 DIAGNOSIS — K76.0 FATTY LIVER: ICD-10-CM

## 2024-03-08 DIAGNOSIS — F33.41 RECURRENT MAJOR DEPRESSIVE DISORDER, IN PARTIAL REMISSION: ICD-10-CM

## 2024-03-08 DIAGNOSIS — R73.01 IFG (IMPAIRED FASTING GLUCOSE): ICD-10-CM

## 2024-03-08 DIAGNOSIS — N28.1 RENAL CYST, LEFT: ICD-10-CM

## 2024-03-08 DIAGNOSIS — E78.2 MIXED HYPERLIPIDEMIA: ICD-10-CM

## 2024-03-08 DIAGNOSIS — E66.01 SEVERE OBESITY (BMI 35.0-35.9 WITH COMORBIDITY): ICD-10-CM

## 2024-03-08 DIAGNOSIS — E55.9 VITAMIN D DEFICIENCY DISEASE: ICD-10-CM

## 2024-03-08 PROBLEM — M25.69 DECREASED RANGE OF MOTION OF TRUNK AND BACK: Status: RESOLVED | Noted: 2020-01-31 | Resolved: 2024-03-08

## 2024-03-08 PROBLEM — M54.50 LOW BACK PAIN: Status: RESOLVED | Noted: 2023-03-08 | Resolved: 2024-03-08

## 2024-03-08 PROBLEM — M62.81 WEAKNESS OF TRUNK MUSCULATURE: Status: RESOLVED | Noted: 2020-01-31 | Resolved: 2024-03-08

## 2024-03-08 PROCEDURE — 3078F DIAST BP <80 MM HG: CPT | Mod: CPTII,S$GLB,, | Performed by: INTERNAL MEDICINE

## 2024-03-08 PROCEDURE — 3074F SYST BP LT 130 MM HG: CPT | Mod: CPTII,S$GLB,, | Performed by: INTERNAL MEDICINE

## 2024-03-08 PROCEDURE — 3008F BODY MASS INDEX DOCD: CPT | Mod: CPTII,S$GLB,, | Performed by: INTERNAL MEDICINE

## 2024-03-08 PROCEDURE — 99999 PR PBB SHADOW E&M-EST. PATIENT-LVL V: CPT | Mod: PBBFAC,,, | Performed by: INTERNAL MEDICINE

## 2024-03-08 PROCEDURE — 99396 PREV VISIT EST AGE 40-64: CPT | Mod: S$GLB,,, | Performed by: INTERNAL MEDICINE

## 2024-03-08 RX ORDER — VIT C/E/ZN/COPPR/LUTEIN/ZEAXAN 250MG-90MG
2000 CAPSULE ORAL DAILY
Qty: 60 CAPSULE | Refills: 12 | Status: SHIPPED | OUTPATIENT
Start: 2024-03-08 | End: 2024-06-12 | Stop reason: SDUPTHER

## 2024-03-08 NOTE — PROGRESS NOTES
Patient ID: Caryn Zarate is a 53 y.o. female.    Chief Complaint: Annual Exam      Assessment:       1. Annual physical exam    2. Severe obesity (BMI 35.0-35.9 with comorbidity)    3. IFG (impaired fasting glucose)    4. Recurrent major depressive disorder, in partial remission    5. Renal cyst, left: see u/s 3/18; stable on CT 4/21    6. Mixed hyperlipidemia    7. Fatty liver: see /s 3/18: fibroscan 12/18 also 2020 F2S3    8. Vitamin D deficiency disease    9. Screening mammogram, encounter for          Plan:       1. Annual physical exam    2. Severe obesity (BMI 35.0-35.9 with comorbidity)    3. IFG (impaired fasting glucose)  -     Hemoglobin A1C; Future; Expected date: 09/04/2024  -     Ambulatory referral/consult to Nutrition Services; Future; Expected date: 03/15/2024  -     Ambulatory referral/consult to Diabetes Education; Future; Expected date: 03/15/2024    4. Recurrent major depressive disorder, in partial remission    5. Renal cyst, left: see u/s 3/18; stable on CT 4/21    6. Mixed hyperlipidemia  -     Ambulatory referral/consult to Nutrition Services; Future; Expected date: 03/15/2024    7. Fatty liver: see /s 3/18: fibroscan 12/18 also 2020 F2S3  -     Ambulatory referral/consult to Hepatology; Future; Expected date: 03/15/2024    8. Vitamin D deficiency disease  -     Vitamin D; Future; Expected date: 09/04/2024    9. Screening mammogram, encounter for  -     Mammo Digital Screening Bilat w/ Anthony; Standing    Other orders  -     cholecalciferol, vitamin D3, (VITAMIN D3) 25 mcg (1,000 unit) capsule; Take 2 capsules (2,000 Units total) by mouth once daily.  Dispense: 60 capsule; Refill: 12       Diet and lifestyle issues reviewed at length, goal of 20-25 lb weight loss in the next year  She wants to see a dietitian get started on lifestyle modification  Sleep hygiene issues reviewed; may need to consider sleep study  Fatty liver issues reviewed, concern given her fibrosis  Resume vitamin-D  supplementation  Repeat A1c in 3 months  Shingles # 2 today  She declines flu shot  Mammogram is scheduled        Subjective:   Annual exam    Labs last year showed IFG, A1c 6.3.  No polydypsia or polyuria.  Now weight loss.    Denies BRYCE sx but sleep is interrupted.    Lipids up    The 10-year ASCVD risk score (Yamileth POPE, et al., 2019) is: 2.6%    Values used to calculate the score:      Age: 53 years      Sex: Female      Is Non- : Yes      Diabetic: No      Tobacco smoker: No      Systolic Blood Pressure: 125 mmHg      Is BP treated: No      HDL Cholesterol: 71 mg/dL      Total Cholesterol: 225 mg/dL         Review of Systems   Constitutional:  Negative for activity change and unexpected weight change.   HENT:  Negative for hearing loss, rhinorrhea and trouble swallowing.    Eyes:  Negative for discharge and visual disturbance.   Respiratory:  Negative for chest tightness and wheezing.    Cardiovascular:  Negative for chest pain and palpitations.   Gastrointestinal:  Negative for blood in stool, constipation, diarrhea and vomiting.   Endocrine: Negative for polydipsia and polyuria.   Genitourinary:  Negative for difficulty urinating, dysuria, hematuria and menstrual problem.   Musculoskeletal:  Negative for arthralgias, joint swelling and neck pain.   Neurological:  Negative for weakness and headaches.   Psychiatric/Behavioral:  Negative for confusion and dysphoric mood.          Objective:      Physical Exam  Vitals and nursing note reviewed.   Constitutional:       Appearance: She is well-developed.   HENT:      Head: Normocephalic and atraumatic.      Right Ear: External ear normal.      Left Ear: External ear normal.      Nose: Nose normal.      Mouth/Throat:      Pharynx: No oropharyngeal exudate.   Eyes:      General: No scleral icterus.     Extraocular Movements: Extraocular movements intact.      Conjunctiva/sclera: Conjunctivae normal.   Neck:      Thyroid: No thyromegaly.       Vascular: No JVD.   Cardiovascular:      Rate and Rhythm: Normal rate and regular rhythm.      Heart sounds: Normal heart sounds. No murmur heard.     No gallop.   Pulmonary:      Effort: Pulmonary effort is normal. No respiratory distress.      Breath sounds: Normal breath sounds. No wheezing.   Abdominal:      General: Bowel sounds are normal. There is no distension.      Palpations: Abdomen is soft. There is no mass.      Tenderness: There is no abdominal tenderness. There is no guarding or rebound.   Musculoskeletal:         General: No tenderness. Normal range of motion.      Cervical back: Normal range of motion and neck supple.   Lymphadenopathy:      Cervical: No cervical adenopathy.   Skin:     General: Skin is warm.      Findings: No erythema or rash.   Neurological:      General: No focal deficit present.      Mental Status: She is alert and oriented to person, place, and time.      Cranial Nerves: No cranial nerve deficit.      Coordination: Coordination normal.   Psychiatric:         Behavior: Behavior normal.         Thought Content: Thought content normal.         Judgment: Judgment normal.             Health Maintenance Due   Topic Date Due    Pneumococcal Vaccines (Age 0-64) (1 of 2 - PCV) Never done    Mammogram  03/31/2024

## 2024-04-12 ENCOUNTER — TELEPHONE (OUTPATIENT)
Dept: INTERNAL MEDICINE | Facility: CLINIC | Age: 54
End: 2024-04-12
Payer: COMMERCIAL

## 2024-04-12 NOTE — TELEPHONE ENCOUNTER
----- Message from Janene Eaton sent at 4/12/2024  1:53 PM CDT -----  FYI    Pt cancelled Consult to Nutrition Services received care elsewhere referral will be cancelled.    Thanks

## 2024-04-19 ENCOUNTER — HOSPITAL ENCOUNTER (OUTPATIENT)
Dept: RADIOLOGY | Facility: HOSPITAL | Age: 54
Discharge: HOME | End: 2024-04-19
Attending: INTERNAL MEDICINE
Payer: COMMERCIAL

## 2024-04-19 DIAGNOSIS — Z12.31 SCREENING MAMMOGRAM, ENCOUNTER FOR: ICD-10-CM

## 2024-04-19 PROCEDURE — 77067 SCR MAMMO BI INCL CAD: CPT | Mod: 26,,, | Performed by: RADIOLOGY

## 2024-04-19 PROCEDURE — 77067 SCR MAMMO BI INCL CAD: CPT | Mod: TC

## 2024-04-19 PROCEDURE — 77063 BREAST TOMOSYNTHESIS BI: CPT | Mod: 26,,, | Performed by: RADIOLOGY

## 2024-05-07 ENCOUNTER — CLINICAL SUPPORT (OUTPATIENT)
Dept: DIABETES | Facility: CLINIC | Age: 54
End: 2024-05-07
Payer: COMMERCIAL

## 2024-05-07 VITALS — BODY MASS INDEX: 36.89 KG/M2 | HEIGHT: 70 IN | WEIGHT: 257.69 LBS

## 2024-05-07 DIAGNOSIS — R73.01 IFG (IMPAIRED FASTING GLUCOSE): ICD-10-CM

## 2024-05-07 DIAGNOSIS — R73.03 PREDIABETES: Primary | ICD-10-CM

## 2024-05-07 PROCEDURE — G0108 DIAB MANAGE TRN  PER INDIV: HCPCS | Mod: S$GLB,,, | Performed by: INTERNAL MEDICINE

## 2024-05-07 PROCEDURE — 99999 PR PBB SHADOW E&M-EST. PATIENT-LVL III: CPT | Mod: PBBFAC,,,

## 2024-05-08 ENCOUNTER — HOSPITAL ENCOUNTER (OUTPATIENT)
Dept: RADIOLOGY | Facility: HOSPITAL | Age: 54
Discharge: HOME | End: 2024-05-08
Attending: INTERNAL MEDICINE
Payer: COMMERCIAL

## 2024-05-08 ENCOUNTER — OFFICE VISIT (OUTPATIENT)
Dept: INTERNAL MEDICINE | Facility: CLINIC | Age: 54
End: 2024-05-08
Payer: COMMERCIAL

## 2024-05-08 VITALS
WEIGHT: 253 LBS | HEIGHT: 70 IN | BODY MASS INDEX: 36.22 KG/M2 | SYSTOLIC BLOOD PRESSURE: 118 MMHG | DIASTOLIC BLOOD PRESSURE: 60 MMHG

## 2024-05-08 DIAGNOSIS — M79.671 RIGHT FOOT PAIN: ICD-10-CM

## 2024-05-08 DIAGNOSIS — R73.01 IFG (IMPAIRED FASTING GLUCOSE): ICD-10-CM

## 2024-05-08 DIAGNOSIS — M79.671 RIGHT FOOT PAIN: Primary | ICD-10-CM

## 2024-05-08 PROCEDURE — 73630 X-RAY EXAM OF FOOT: CPT | Mod: 26,RT,, | Performed by: RADIOLOGY

## 2024-05-08 PROCEDURE — 99213 OFFICE O/P EST LOW 20 MIN: CPT | Mod: S$GLB,,, | Performed by: INTERNAL MEDICINE

## 2024-05-08 PROCEDURE — 3078F DIAST BP <80 MM HG: CPT | Mod: CPTII,S$GLB,, | Performed by: INTERNAL MEDICINE

## 2024-05-08 PROCEDURE — 3008F BODY MASS INDEX DOCD: CPT | Mod: CPTII,S$GLB,, | Performed by: INTERNAL MEDICINE

## 2024-05-08 PROCEDURE — 99999 PR PBB SHADOW E&M-EST. PATIENT-LVL IV: CPT | Mod: PBBFAC,,, | Performed by: INTERNAL MEDICINE

## 2024-05-08 PROCEDURE — 73630 X-RAY EXAM OF FOOT: CPT | Mod: TC,RT

## 2024-05-08 PROCEDURE — 3074F SYST BP LT 130 MM HG: CPT | Mod: CPTII,S$GLB,, | Performed by: INTERNAL MEDICINE

## 2024-05-08 PROCEDURE — 1159F MED LIST DOCD IN RCRD: CPT | Mod: CPTII,S$GLB,, | Performed by: INTERNAL MEDICINE

## 2024-05-08 PROCEDURE — 1160F RVW MEDS BY RX/DR IN RCRD: CPT | Mod: CPTII,S$GLB,, | Performed by: INTERNAL MEDICINE

## 2024-05-08 NOTE — PROGRESS NOTES
Patient ID: Caryn Zarate is a 53 y.o. female.    Chief Complaint: Foot Injury      Assessment:       1. Right foot pain    2. IFG (impaired fasting glucose)          Plan:         1. Right foot pain  -     X-Ray Foot Complete 3 view Right; Future; Expected date: 05/08/2024  -     Ambulatory referral/consult to Podiatry; Future; Expected date: 05/15/2024  -     Uric Acid; Future; Expected date: 05/08/2024    2. IFG (impaired fasting glucose)       X-ray and review  Proper footwear reviewed, more cushioning and support  Ice, elevation  Continue to work on weight loss  Labs already scheduled for IFG follow-up  I will review all studies and determine further tx depending on findings      Subjective:   [facility] appt    Pain right foot lateral aspect for about 3 weeks.  No trauma or injury.  Does not recall a sprain.  Has been trying to do some exercise, walking.  Wears Sketchers when she does this.  No toe pain.  Other foot seems to be fine.  Anti-inflammatories help but the pain continues to recur.    Working on a diabetic eating plan and exercise, labs scheduled for next month.    Patient Active Problem List:     Recurrent major depressive disorder, in partial remission     Sleep apnea: needs CPAP 10 per sleep study 6/14- declines tx; mild per HST 5/21     IFG (impaired fasting glucose)     Vitamin D deficiency disease     Fatty liver: see u/s 3/18: fibroscan 12/18 also 2020 F2S3     Renal cyst, left: see u/s 3/18; stable on CT 4/21     Pelvic floor dysfunction     Mixed hyperlipidemia     Chronic midline low back pain without sciatica     Severe obesity (BMI 35.0-35.9 with comorbidity)     Impaired functional mobility and activity tolerance     Lumbar radiculopathy, chronic           Review of Systems   Constitutional:  Negative for activity change and unexpected weight change.   HENT:  Negative for hearing loss, rhinorrhea and trouble swallowing.    Eyes:  Negative for discharge and visual disturbance.   Respiratory:   Negative for chest tightness and wheezing.    Cardiovascular:  Negative for chest pain and palpitations.   Gastrointestinal:  Negative for blood in stool, constipation, diarrhea and vomiting.   Endocrine: Negative for polydipsia and polyuria.   Genitourinary:  Negative for difficulty urinating, dysuria, hematuria and menstrual problem.   Musculoskeletal:  Positive for arthralgias. Negative for joint swelling and neck pain.        See HPI   Neurological:  Negative for weakness and headaches.   Psychiatric/Behavioral:  Negative for confusion and dysphoric mood.          Objective:      Physical Exam  Constitutional:       Appearance: She is well-developed.   HENT:      Head: Normocephalic and atraumatic.   Eyes:      Extraocular Movements: Extraocular movements intact.      Conjunctiva/sclera: Conjunctivae normal.   Neck:      Thyroid: No thyromegaly.   Pulmonary:      Effort: No respiratory distress.   Musculoskeletal:      Comments: Slight discomfort and protrusion lateral aspect right foot, no instability of the ankle.  No toe pain or swelling   Neurological:      General: No focal deficit present.      Mental Status: She is alert and oriented to person, place, and time.   Psychiatric:         Mood and Affect: Mood normal.         Behavior: Behavior normal.         Thought Content: Thought content normal.         Judgment: Judgment normal.             Health Maintenance Due   Topic Date Due    Pneumococcal Vaccines (Age 0-64) (1 of 2 - PCV) Never done

## 2024-05-09 NOTE — PROGRESS NOTES
"Diabetes Care Specialist Progress Note  Author: Elsie Burks RN, CDE  Date: 5/9/2024    Program Intake  Reason for Diabetes Program Visit:: Initial Diabetes Assessment  Current diabetes risk level:: low  In the last 12 months, have you:: none  Continuous Glucose Monitoring  Patient has CGM: No    Lab Results   Component Value Date    HGBA1C 6.3 (H) 12/07/2023     Clinical    Weight: 116.9 kg (257 lb 11.5 oz)   Height: 5' 10" (177.8 cm)   Body mass index is 36.98 kg/m².    Patient Health Rating  Compared to other people your age, how would you rate your health?: Good    Problem Review  Reviewed Problem List with Patient: yes  Active comorbidities affecting diabetes self-care.: no  Reviewed health maintenance: yes    Clinical Assessment  Current Diabetes Treatment: Diet    Nutritional Status  Diet: Regular  Meal Plan 24 Hour Recall: Breakfast, Lunch, Dinner, Snack  Meal Plan 24 Hour Recall - Breakfast: sausage biscuit w/ grape jelly ([employer]), coffee w/ cream and sugar (long pours)  Meal Plan 24 Hour Recall - Lunch: crawfish sandi pasta, corn -cucumber-tomato salad and bread pudding, sweet tea  Meal Plan 24 Hour Recall - Dinner: raisin bran cereal w/ milk (2%)  Meal Plan 24 Hour Recall - Snack: doesn't really snack (if does, usually goes for unsalted mixed nuts)  Change in appetite?: No  Dentation:: Intact  Recent Changes in Weight: Weight Gain  Current nutritional status an area of need that is impacting patient's ability to self-manage diabetes?: No    Additional Social History     Access to [employer]  Does the patient have access to any of the following devices or technologies?: Smart phone  Media or technology needs impacting ability to self-manage diabetes?: No    Cognitive/Behavioral Health  Alert and Oriented: Yes  Difficulty Thinking: No  Requires Prompting: No  Requires assistance for routine expression?: No  Cognitive or behavioral barriers impacting ability to self-manage diabetes?: " No    Culture/Jehovah's witness  Culture or Buddhist beliefs that may impact ability to access healthcare: No    Communication  Language preference: English  Hearing Problems: No  Vision Problems: No  Communication needs impacting ability to self-manage diabetes?: No    Health Literacy  Preferred Learning Method: Face to Face  How often do you need to have someone help you read instructions, pamphlets, or written material from your doctor or pharmacy?: Rarely  Health literacy needs impacting ability to self-manage diabetes?: No    Diabetes Self-Management Skills Assessment    Diabetes Disease Process/Treatment Options  Patient/caregiver able to state what happens when someone has diabetes.: somewhat  Patient/caregiver knows what type of diabetes they have.: yes  Diabetes Type : Pre-Diabetes  Patient able to identify at least three risk factors for diabetes.: yes  Identified risk factors:: age over 40, being overweight, reduced activity  Diabetes Disease Process/Treatment Options: Skills Assessment Completed: Yes  Assessment indicates:: Adequate understanding  Area of need?: No    Nutrition/Healthy Eating  Challenges to healthy eating:: eating out, going to parties, eating when feeling depressed/stressed, portion control  Method of carbohydrate measurement:: no method  Patient can identify foods that impact blood sugar.: no (see comments)  Nutrition/Healthy Eating Skills Assessment Completed:: Yes  Assessment indicates:: Knowledge deficit, Instruction Needed  Area of need?: Yes    Physical Activity/Exercise  Patient's daily activity level:: sedentary  Patient formally exercises outside of work.: no  Patient can identify forms of physical activity.: yes  Stated forms of physical activity:: any movement performed by muscles that uses energy  Patient can identify reasons why exercise/physical activity is important in diabetes management.: no  Physical Activity/Exercise Skills Assessment Completed: : Yes  Assessment indicates::  Knowledge deficit, Instruction Needed  Area of need?: Yes    Medications  Patient is able to describe current diabetes management routine.: no (not taking any medication currently)  Medication Skills Assessment Completed:: Yes  Assessment indicates:: Other (comment) (no meds currently; focusing on diet and exercise with repeat labs before medication escalation)  Area of need?: Deferred    Home Blood Glucose Monitoring  Home Blood Glucose Monitoring Skills Assessment Completed: : No  Deferred due to:: Time    Acute Complications  Acute Complications Skills Assessment Completed: : No  Deffered due to:: Time    Chronic Complications  Chronic Complications Skills Assessment Completed: : No  Deferred due to:: Time    Psychosocial/Coping  Psychosocial/Coping Skills Assessment Completed: : No  Deffered due to:: Time    Assessment Summary and Plan    Based on today's diabetes care assessment, the following areas of need were identified:          5/7/2024    12:01 AM   Social   Access to [employer] No   Cognitive/Behavioral Health No   Culture/Quaker No   Communication No   Health Literacy No          5/7/2024    12:01 AM   Clinical   Nutritional Status No          5/7/2024    12:01 AM   Diabetes Self-Management Skills   Diabetes Disease Process/Treatment Options No   Nutrition/Healthy Eating Yes: see care planning    Physical Activity/Exercise Yes: discussed will review at next visit once pt has worked on nutrition changes for ~1 month   Medication Deferred      Today's interventions were provided through individual discussion, instruction, and written materials were provided.      Patient verbalized understanding of instruction and written materials.  Pt was able to return back demonstration of instructions today. Patient understood key points, needs reinforcement and further instruction.     Diabetes Self-Management Care Plan:    Today's Diabetes Self-Management Care Plan was developed with Caryn's input. Caryn has  agreed to work toward the following goal(s) to improve his/her overall diabetes control.      Care Plan: Diabetes Management   Updates made since 4/9/2024 12:00 AM        Problem: Healthy Eating         Goal: Pt will work towards consuming 30-45 grams of carbs with each meal (or 1 fist sized serving).    Start Date: 5/7/2024   Expected End Date: 6/8/2024   Priority: High   Barriers: No Barriers Identified        Task: Reviewed the sources and role of Carbohydrate, Protein, and Fat and how each nutrient impacts blood sugar. Completed 5/9/2024        Task: Provided visual examples using dry measuring cups, food models, and other familiar objects such as computer mouse, deck or cards, tennis ball etc. to help with visualization of portions. Completed 5/9/2024        Task: Explained how to count carbohydrates using the food label and the use of dry measuring cups for accurate carb counting. Completed 5/9/2024        Task: Discussed strategies for choosing healthier menu options when dining out. Completed 5/9/2024        Task: Recommended replacing beverages containing high sugar content with noncaloric/sugar free options and/or water. Completed 5/9/2024        Task: Review the importance of balancing carbohydrates with each meal using portion control techniques to count servings of carbohydrate and label reading to identify serving size and amount of total carbs per serving. Completed 5/9/2024        Task: Provided Sample plate method and reviewed the use of the plate to estimate amounts of carbohydrate per meal. Completed 5/9/2024        Follow Up Plan     Follow up in about 6 weeks (around 6/18/2024) for review of progress with nutrition goals. .    Today's care plan and follow up schedule was discussed with patient.  Caryn verbalized understanding of the care plan, goals, and agrees to follow up plan.        The patient was encouraged to communicate with his/her health care provider/physician and care team regarding  his/her condition(s) and treatment.  I provided the patient with my contact information today and encouraged to contact me via phone or [facility] Patient Portal as needed.     Length of Visit   Total Time: 60 Minutes

## 2024-05-15 ENCOUNTER — OFFICE VISIT (OUTPATIENT)
Dept: PODIATRY | Facility: CLINIC | Age: 54
End: 2024-05-15
Payer: COMMERCIAL

## 2024-05-15 VITALS
HEART RATE: 80 BPM | DIASTOLIC BLOOD PRESSURE: 76 MMHG | SYSTOLIC BLOOD PRESSURE: 131 MMHG | WEIGHT: 253.06 LBS | HEIGHT: 70 IN | BODY MASS INDEX: 36.23 KG/M2

## 2024-05-15 DIAGNOSIS — M76.71 PERONEUS BREVIS TENDINITIS, RIGHT: Primary | ICD-10-CM

## 2024-05-15 DIAGNOSIS — M79.671 RIGHT FOOT PAIN: ICD-10-CM

## 2024-05-15 PROCEDURE — 1159F MED LIST DOCD IN RCRD: CPT | Mod: CPTII,S$GLB,, | Performed by: PODIATRIST

## 2024-05-15 PROCEDURE — 99203 OFFICE O/P NEW LOW 30 MIN: CPT | Mod: S$GLB,,, | Performed by: PODIATRIST

## 2024-05-15 PROCEDURE — 3075F SYST BP GE 130 - 139MM HG: CPT | Mod: CPTII,S$GLB,, | Performed by: PODIATRIST

## 2024-05-15 PROCEDURE — 3008F BODY MASS INDEX DOCD: CPT | Mod: CPTII,S$GLB,, | Performed by: PODIATRIST

## 2024-05-15 PROCEDURE — 1160F RVW MEDS BY RX/DR IN RCRD: CPT | Mod: CPTII,S$GLB,, | Performed by: PODIATRIST

## 2024-05-15 PROCEDURE — 99999 PR PBB SHADOW E&M-EST. PATIENT-LVL IV: CPT | Mod: PBBFAC,,, | Performed by: PODIATRIST

## 2024-05-15 PROCEDURE — 3078F DIAST BP <80 MM HG: CPT | Mod: CPTII,S$GLB,, | Performed by: PODIATRIST

## 2024-05-15 RX ORDER — MELOXICAM 15 MG/1
15 TABLET ORAL DAILY
Qty: 30 TABLET | Refills: 1 | Status: SHIPPED | OUTPATIENT
Start: 2024-05-15 | End: 2025-05-15

## 2024-05-15 NOTE — PATIENT INSTRUCTIONS
Over the counter pain creams: Voltaren Gel, Biofreeze, Bengay, tiger balm, two old goat, lidocaine gel,  Absorbine Veterinary Liniment Gel Topical Analgesic Sore Muscle and Joint Pain Relief    Recommend lotions: eucerin, eucerin for diabetics, aquaphor, A&D ointment, gold bond for diabetics, sween, [place]'s Bees all purpose baby ointment,  urea 40 with aloe or SkinIntegra rapid crack repair (found on amazon.com)    Shoe recommendations: (try 6pm.com, zappos.com , [email or web address].[email or web address], or shoes.com for discounted prices) you can visit varsity shoes in [place], DSW shoes in [place]  or chayo rack in the [facility] (there are also several shoe brand outlets in the [facility])    ONLY purchase stability style tennis shoes NO flex, foam, free, yoga mat style shoes    Shoe examples:    Asics (GT 4000 or gel foundations), new balance stability type shoes (such as the 940 series), saucony (stabil c3),  Santana (GTS or Beast or   transcend), propet, HokaOne (tennis shoe) Rachid (tennis shoes and boots)    Sofft Jakub (women) Hoa&Rafi (men), clarks, crocs, aerosoles, naturalizers, SAS, ecco, born, isaias montgomery, rockports (dress shoes)    Vionic, burkenstocks, fitflops, propet, taos, baretraps (sandals)    HokaOne sandals, crocs, propet (house shoes)      Nail Home remedy:  Vicks Vapor rub or Emuaid to nails for easier manageability    What Is Tendonitis of the Foot?  When you use a set of muscles too much, youre likely to strain the tendons (soft tissues) that connect those muscles to your bones. At first, pain or swelling may come and go quickly. But if you do too much too soon, your muscles may overtire again. The strain may cause a tendons outer covering to swell or small fibers in a tendon to pull apart. If you keep pushing your muscles, damage to the tendons adds up, and tendonitis develops. Over time, pain and swelling may limit your activities. But with your doctors help, tendonitis can be controlled. Both  your symptoms and your risk of future problems including tendon rupture can be reduced.       The back of your foot  The Achilles tendon connects the calf muscle to the heel bone. If tendonitis occurs here, you may feel pain when your foot touches down or when your heel lifts off the ground.   The front of your foot  The anterior tibial tendon helps control the front of your foot when it meets the ground. If this tendon is strained, you may feel pain when you go down stairs or walk or run on hills.     The inside of your foot  The posterior tibial tendon runs along the inside of the ankle and foot. If this tendon is strained, your foot may hurt when it moves forward to push off the ground. Or you may feel pain when your heel shifts from side to side.   The outside of your foot  The peroneal tendon wraps across the bottom of your foot, from the outside to the inside. Tendonitis here may cause pain when you stand or push off the ground and when walking on uneven surfaces.   Date Last Reviewed: 9/21/2015 © 2000-2017 [employer]. [address] [place] [address]. All rights reserved. This information is not intended as a substitute for professional medical care. Always follow your healthcare professional's instructions.        Treating Tendonitis of the Foot  Your healthcare provider's first concern is to reduce your symptoms. Using ice and heat, taking medicines, and limiting activity help control pain and swelling. Follow all of your healthcare provider's instructions. Returning to activity too soon may cause your symptoms to come back.    Ice and heat  Ice helps prevent swelling and reduce pain. Place ice on the painful area for 10 to 15 minutes. Repeat the icing several times a day. If ?you have had the problem for a while, using heat may help. Apply a heating pad or hot towels to the tendon for 20 to 30 minutes 2 or 3 times a day.  Medicines  Your healthcare provider may tell you to take  ibuprofen or other anti-inflammatory medicines. These reduce pain and swelling. Take them as directed. Dont wait until you feel pain. In more severe cases, cortisone may be injected to relieve pain.  Limiting activities  Rest allows the tissues in your foot to heal. Stay off your feet for a few days, then slowly work back into activity. If you do high-impact activities, such as running or aerobics, try other activities that place less strain on your foot. Cycling and swimming are good choices.  Date Last Reviewed: 9/21/2015 © 2000-2017 [employer]. [address] [place] [id number]. All rights reserved. This information is not intended as a substitute for professional medical care. Always follow your healthcare professional's instructions.          Wearing Proper Shoes                    You walk on your feet every day, forcing them to support the weight of your body. Repeated stress on your feet can cause damage over time. The right shoes can help protect your feet. The wrong shoes can cause more foot problems. Read the information below to help you find a shoe that fits your foot needs.      A good shoe fit will cover your foot outline. A shoe that doesnt cover the outline is a bad fit.   Whats your foot shape?  To get a good fit, you need to know the shape of your foot. Do this simple test: While standing, place your foot on a piece of paper and trace around it. Is your foot straight or curved? Do you have a foot problem, such as a bunion, that causes your foot outline to show a bulge on the side of your big toe?  Finding your fit  Bring your foot outline to the shoe store to help you find the right shoe. Place a shoe you like on top of the outline to see if it matches the shape. The shoe should cover the outline. (If you have a bunion, the shoe may not cover the bulge on the outline. Look for soft leather shoes to stretch over the bunion.) Once youve found a pair of proper shoes, put  them on. Walk around. Be sure the shoes dont rub or pinch. If the shoes feel good, youve found your fit!  The right shoe for you  A good shoe has features that provide comfort and support. It must also be the right size and shape for your feet. Look for a shoe made of breathable fabric and lining, such as leather or canvas. Be sure that shoes have enough tread to prevent slipping. Go to a good shoe store for help finding the right shoe.  Good shoe features  An ideal shoe has the following:  Laces for support. If tying laces is a problem for you, try shoes with Velcro fasteners or carolee.  A front of the shoe (toe box) with ½ inch space in front of your longest toes.  An arch shape that supports your foot.  No more than 1½ inches of heel.  A stiff, snug back of the shoe to keep your foot from sliding around.  A smooth lining with no rough seams.  Shoe shopping tips  Below are some dos and donts for when you go to the shoe store.  Do:  Select the shoes that feel right. Wear them around the house. Then bring them to your foot healthcare provider to check for fit. If they dont fit well, return them.  Shop late in the day, when your feet will be slightly bigger.  Each time you buy shoes, have both your feet measured while you are standing. Foot size changes with time.  Pick shoes to suit their purpose. High heels are OK for an occasional night on the town. But for everyday wear, choose a more sensible shoe.  Try on shoes while wearing any inserts specially made for your feet (orthoses).  Try on both the right and left shoes. If your feet are different sizes, pick a pair that fits the larger foot.  Dont:  Dont buy shoes based on shoe size alone. Always try on shoes, as sizes differ from brand to brand and within brands.  Dont expect shoes to break in. If they dont fit at the store, dont buy them.  Dont buy a shoe that doesnt match your foot shape.  What about socks?  Always wear socks with shoes. Socks help  absorb sweat and reduce friction and blistering. When shopping for shoes, choose soft, padded socks with seams that dont irritate your feet.  If you have foot problems  Some foot problems cause deformities. This can make it hard to find a good fit. Look for shoes made of soft leather to stretch over the deformity. If you have bunions, buy shoes with a wider toe box. To fit hammertoes, look for shoes with a tall toe box. If you have arch problems, you may need inserts. In some cases, youll need to have custom footwear or orthoses made for your feet.  Suggested footwear  Ask your healthcare provider what kind of footwear you need. He or she may recommend a certain brand or shoe store.  Date Last Reviewed: 8/1/2016 © 2000-2017 [employer]. [address] [place] [address], [place] [address]. All rights reserved. This information is not intended as a substitute for professional medical care. Always follow your healthcare professional's instructions.

## 2024-05-15 NOTE — PROGRESS NOTES
Subjective:      Patient ID: Caryn Zarate is a 53 y.o. female.    Chief Complaint: Foot Pain (Left foot )    Caryn is a 53 y.o. female who presents to the clinic upon referral from Dr. Laws  for evaluation and treatment of diabetic feet. Caryn has a past medical history of Depression, Fatty liver: see u/s 3/18 (3/21/2018), IFG (impaired fasting glucose) (3/18/2016), Obesity, Renal cyst, left: see u/s 3/18 (3/21/2018), Sleep apnea (5/26/2014), and Vitamin D deficiency disease (3/18/2016).Her chief complaint isright foot pain, especially with palpation and ambulation. Description: moderate Nature: aching, burning, sharp, and throbbing Location: lateral and midfoot Onset of the symptoms was several weeks ago. Precipitating event: none known.  History of injury: no Has been trying to do some exercise, walking.  Wears Sketchers when she does this.  No toe pain.  Other foot seems to be fine.  Anti-inflammatories help but the pain continues to recur. Referred from urgent care following a 05/08 visit      PCP: Layla Lasw MD    Date Last Seen by PCP:   Chief Complaint   Patient presents with    Foot Pain     Left foot          Hemoglobin A1C   Date Value Ref Range Status   12/07/2023 6.3 (H) 4.0 - 5.6 % Final     Comment:     ADA Screening Guidelines:  5.7-6.4%  Consistent with prediabetes  >or=6.5%  Consistent with diabetes    High levels of fetal hemoglobin interfere with the HbA1C  assay. Heterozygous hemoglobin variants (HbS, HgC, etc)do  not significantly interfere with this assay.   However, presence of multiple variants may affect accuracy.     07/12/2022 6.2 (H) 4.0 - 5.6 % Final     Comment:     ADA Screening Guidelines:  5.7-6.4%  Consistent with prediabetes  >or=6.5%  Consistent with diabetes    High levels of fetal hemoglobin interfere with the HbA1C  assay. Heterozygous hemoglobin variants (HbS, HgC, etc)do  not significantly interfere with this assay.   However, presence of multiple variants may affect  accuracy.     03/08/2018 5.6 4.0 - 5.6 % Final     Comment:     According to ADA guidelines, hemoglobin A1c <7.0% represents  optimal control in non-pregnant diabetic patients. Different  metrics may apply to specific patient populations.   Standards of Medical Care in Diabetes-2016.  For the purpose of screening for the presence of diabetes:  <5.7%     Consistent with the absence of diabetes  5.7-6.4%  Consistent with increasing risk for diabetes   (prediabetes)  >or=6.5%  Consistent with diabetes  Currently, no consensus exists for use of hemoglobin A1c  for diagnosis of diabetes for children.  This Hemoglobin A1c assay has significant interference with fetal   hemoglobin   (HbF). The results are invalid for patients with abnormal amounts of   HbF,   including those with known Hereditary Persistence   of Fetal Hemoglobin. Heterozygous hemoglobin variants (HbAS, HbAC,   HbAD, HbAE, HbA2) do not significantly interfere with this assay;   however, presence of multiple variants in a sample may impact the %   interference.               Patient Active Problem List   Diagnosis    Recurrent major depressive disorder, in partial remission    Sleep apnea: needs CPAP 10 per sleep study 6/14- declines tx; mild per HST 5/21    IFG (impaired fasting glucose)    Vitamin D deficiency disease    Fatty liver: see u/s 3/18: fibroscan 12/18 also 2020 F2S3    Renal cyst, left: see u/s 3/18; stable on CT 4/21    Pelvic floor dysfunction    Mixed hyperlipidemia    Chronic midline low back pain without sciatica    Severe obesity (BMI 35.0-35.9 with comorbidity)    Impaired functional mobility and activity tolerance    Lumbar radiculopathy, chronic       Current Outpatient Medications on File Prior to Visit   Medication Sig Dispense Refill    cholecalciferol, vitamin D3, (VITAMIN D3) 25 mcg (1,000 unit) capsule Take 2 capsules (2,000 Units total) by mouth once daily. 60 capsule 12    DULoxetine (CYMBALTA) 60 MG capsule TAKE 1 CAPSULE(60  MG) BY MOUTH EVERY DAY AS NEEDED 90 capsule 2    methocarbamoL (ROBAXIN) 500 MG Tab Take 1 tablet (500 mg total) by mouth 4 (four) times daily. 120 tablet 1    multivitamin (THERAGRAN) per tablet Take 1 tablet by mouth once daily.       No current facility-administered medications on file prior to visit.       Review of patient's allergies indicates:   Allergen Reactions    Latex, natural rubber        Past Surgical History:   Procedure Laterality Date     SECTION      COLONOSCOPY N/A 2018    Procedure: COLONOSCOPY;  Surgeon: Milton Park MD;  Location: [facility] ([address] [place] [address]);  Service: Endoscopy;  Laterality: N/A;    COLONOSCOPY N/A 3/24/2022    Procedure: COLONOSCOPY;  Surgeon: Doe Rebolledo MD;  Location: [facility] ([address] [place] [address]);  Service: Endoscopy;  Laterality: N/A;  Covid test on 3/21 at [email or web address] Fully Vaccinated.[facility]    HYSTERECTOMY      ovaries remain    LAPAROSCOPIC HYSTERECTOMY  2009    [facility], for uterine fibroids    TUBAL LIGATION         Family History   Problem Relation Name Age of Onset    Hypertension Mother      Diabetes Father d 46     Kidney disease Father d 46         Dialysis    Hypertension Father d 46     Cancer Maternal Grandmother          breast    Breast cancer Maternal Grandmother      No Known Problems Daughter Tarsha     No Known Problems Daughter Elaine     Breast cancer Unknown      Colon cancer Neg Hx      Ovarian cancer Neg Hx      Melanoma Neg Hx      Psoriasis Neg Hx      Lupus Neg Hx      Cirrhosis Neg Hx         Social History     Socioeconomic History    Marital status:     Number of children: 2   Occupational History     Employer: [employer]   Tobacco Use    Smoking status: Never    Smokeless tobacco: Never   Substance and Sexual Activity    Alcohol use: Yes     Comment: rarely    Drug use: No    Sexual activity: Yes     Partners: Male     Birth control/protection: Surgical     Social Determinants of Health     Financial  "Resource Strain: Low Risk  (5/7/2024)    Overall Financial Resource Strain (CARDIA)     Difficulty of Paying Living Expenses: Not hard at all   Food Insecurity: No Food Insecurity (5/7/2024)    Hunger Vital Sign     Worried About Running Out of Food in the Last Year: Never true     Ran Out of Food in the Last Year: Never true   Transportation Needs: No Transportation Needs (5/7/2024)    PRAPARE - Transportation     Lack of Transportation (Medical): No     Lack of Transportation (Non-Medical): No   Physical Activity: Insufficiently Active (5/7/2024)    Exercise Vital Sign     Days of Exercise per Week: 2 days     Minutes of Exercise per Session: 10 min   Stress: No Stress Concern Present (5/7/2024)    [employer] [place] [employer] - Occupational Stress Questionnaire     Feeling of Stress : Only a little   Housing Stability: Unknown (5/7/2024)    Housing Stability Vital Sign     Unable to Pay for Housing in the Last Year: No       Review of Systems   Constitutional: Negative for chills and fever.   Cardiovascular:  Negative for claudication and leg swelling.   Respiratory:  Negative for cough and shortness of breath.    Skin:  Negative for itching and rash.   Musculoskeletal:  Positive for back pain, joint pain, myalgias and stiffness. Negative for falls, joint swelling and muscle weakness.   Gastrointestinal:  Negative for diarrhea, nausea and vomiting.   Neurological:  Positive for paresthesias. Negative for numbness, tremors and weakness.   Psychiatric/Behavioral:  Negative for altered mental status and hallucinations.            Objective:      Vitals:    05/15/24 0848   BP: 131/76   Pulse: 80   Weight: 114.8 kg (253 lb 1.4 oz)   Height: 5' 10" (1.778 m)       Physical Exam  Vitals and nursing note reviewed.   Constitutional:       General: She is not in acute distress.     Appearance: She is well-developed. She is not toxic-appearing or diaphoretic.      Comments: alert and oriented x 3.  "   Cardiovascular:      Pulses:           Dorsalis pedis pulses are 2+ on the right side and 2+ on the left side.        Posterior tibial pulses are 2+ on the right side and 2+ on the left side.      Comments:  Capillary refill time is within normal limits. Digital hair present.   Pulmonary:      Effort: No respiratory distress.   Musculoskeletal:         General: No deformity.      Right ankle: Tenderness present over the base of 5th metatarsal (Pain on palpation to styloid process region, pain with eversion of affected foot.). No lateral malleolus, medial malleolus, AITF ligament, CF ligament or posterior TF ligament tenderness.      Right Achilles Tendon: No defects. Rausch's test negative.      Left ankle: No tenderness. No lateral malleolus, medial malleolus, AITF ligament, CF ligament or posterior TF ligament tenderness.      Left Achilles Tendon: No defects. Rausch's test negative.      Right foot: No bony tenderness.      Left foot: No tenderness or bony tenderness.      Comments: Muscle strength is 5/5 in all groups bilaterally.           Feet:      Right foot:      Protective Sensation: 5 sites tested.  5 sites sensed.      Skin integrity: Skin integrity normal.      Left foot:      Protective Sensation: 5 sites tested.  5 sites sensed.      Skin integrity: Skin integrity normal.   Lymphadenopathy:      Comments: No lymphatic streaking     Skin:     General: Skin is warm and dry.      Coloration: Skin is not pale.      Findings: No rash.      Nails: There is no clubbing.      Comments: Skin is of normal turgor.   Normal temperature gradient.  Examination of the skin reveals no evidence of significant rashes, open lesions, suspicious appearing nevi or other concerning lesions.    Neurological:      Sensory: No sensory deficit.      Motor: No atrophy.      Comments: Light touch present    Botkins-Sussy 5.07 monofilament is intact bilateral feet. Sharp/dull sensation is also intact Bilateral  feet.    proprioception intact    Vibratory intact       Psychiatric:         Attention and Perception: She is attentive.         Mood and Affect: Mood is not anxious. Affect is not inappropriate.         Speech: She is communicative. Speech is not slurred.         Behavior: Behavior is not combative.               Assessment:       Encounter Diagnoses   Name Primary?    Right foot pain     Peroneus brevis tendinitis, right Yes         Plan:     Problem List Items Addressed This Visit    None  Visit Diagnoses       Peroneus brevis tendinitis, right    -  Primary    Right foot pain                 I counseled the patient on her conditions, their implications and medical management.    Discussed tendon pathology and importance of immobilization for healing as well as the lengthy course of treatment to decrease potential deformity and arthralgia of the foot/ankle     Discussed different treatment options for pain. Detailed conversation about patient responsibility for resolution of this condition to avoid need for surgical intervention. Discussed the amount of time it may take to achieve resolution but also that this condition can reoccur. I gave written and verbal instructions on stretching exercises. Patient expressed understanding. Discussed icing the affected area as needed and also wearing appropriate shoe gear and avoiding flats, slippers, sandals, and going barefoot. My recommendation for OTC supports is [employer]. Patient instructed on adequate icing techniques. Patient should ice the affected area at least once per day x 10 minutes for 10 days . I advised the patient that extra icing would also be beneficial to ensure adequate anti inflammatory effect. We also discussed cortisone injections and NSAID therapy.     Mobic prescribed. Patient was instructed on dosing information. The patient was advised that NSAID-type medications have two very important potential side effects: gastrointestinal  irritation including hemorrhage and renal injuries. She was asked to take the medication with food and to stop if she experiences any GI upset. I asked her to call for vomiting, abdominal pain or black/bloody stools. The patient expresses understanding of these issues and questions were answered.    Education about the diabetic foot, neuropathy, and prevention of limb loss.    Shoe inspection. Diabetic Foot Education. Patient reminded of the importance of good nutrition/healthy diet/weight management and blood sugar control to help prevent podiatric complications of diabetes. Patient instructed on proper foot hygeine. Wear comfortable, proper fitting shoes. Wash feet daily. Dry well. After drying, apply moisturizer to feet (no lotion to webspaces). Inspect feet daily for skin breaks, blisters, swelling, or redness. Wear cotton socks (preferably white)  Change socks every day. Do NOT walk barefoot. Do NOT use heating pads or hot water soaks. We discussed wearing proper shoe gear, daily foot inspections, never walking without protective shoe gear.     Based on clinical exam, patient has palpable pulses and intact protective sensation and therefore does not qualify for foot care. Patients who qualify for nail care are usually as follows: diabetic with neurological manifestations, PVD, pernicious anemia, or CKD with appropriate modifiers that indicate high amputation risk (evidence of decreased perfusion, previous amputation, loss of protective sensation,etc). Therefore patient is low risk for developing lower extremity issues secondary to diabetes. I recommend continued yearly diabetic foot examinations. Patients without pedal manifestations of DM, do not qualify for nail/callus trimming       RTC if no improvement, at this time she will receive cortisone injections and referral to PT. Patient is amenable to plan.

## 2024-05-16 NOTE — TELEPHONE ENCOUNTER
I called the patient twice to see if she wanted to connect to do the virtual visit appointment. I left a voicemail stating to call the [facility] back at [phone] to reschedule her virtual appointment.    Connie Keller [facility], PA-C  Neurosurgery  [facility]  [facility]     Prior auth completed through [facility]. Received automatic approval. Med approved through 5/16/25. Will follow up with pharmacy tomorrow.

## 2024-06-07 ENCOUNTER — LAB VISIT (OUTPATIENT)
Dept: LAB | Facility: HOSPITAL | Age: 54
End: 2024-06-07
Attending: INTERNAL MEDICINE
Payer: COMMERCIAL

## 2024-06-07 DIAGNOSIS — E55.9 VITAMIN D DEFICIENCY DISEASE: ICD-10-CM

## 2024-06-07 DIAGNOSIS — R73.01 IFG (IMPAIRED FASTING GLUCOSE): ICD-10-CM

## 2024-06-07 DIAGNOSIS — M79.671 RIGHT FOOT PAIN: ICD-10-CM

## 2024-06-07 LAB
25(OH)D3+25(OH)D2 SERPL-MCNC: 18 NG/ML (ref 30–96)
ESTIMATED AVG GLUCOSE: 131 MG/DL (ref 68–131)
HBA1C MFR BLD: 6.2 % (ref 4–5.6)
URATE SERPL-MCNC: 5.4 MG/DL (ref 2.4–5.7)

## 2024-06-07 PROCEDURE — 36415 COLL VENOUS BLD VENIPUNCTURE: CPT | Performed by: INTERNAL MEDICINE

## 2024-06-07 PROCEDURE — 83036 HEMOGLOBIN GLYCOSYLATED A1C: CPT | Performed by: INTERNAL MEDICINE

## 2024-06-07 PROCEDURE — 82306 VITAMIN D 25 HYDROXY: CPT | Performed by: INTERNAL MEDICINE

## 2024-06-07 PROCEDURE — 84550 ASSAY OF BLOOD/URIC ACID: CPT | Performed by: INTERNAL MEDICINE

## 2024-06-10 ENCOUNTER — PATIENT MESSAGE (OUTPATIENT)
Dept: INTERNAL MEDICINE | Facility: CLINIC | Age: 54
End: 2024-06-10
Payer: COMMERCIAL

## 2024-06-12 ENCOUNTER — PATIENT MESSAGE (OUTPATIENT)
Dept: INTERNAL MEDICINE | Facility: CLINIC | Age: 54
End: 2024-06-12
Payer: COMMERCIAL

## 2024-06-12 DIAGNOSIS — E55.9 VITAMIN D DEFICIENCY DISEASE: Primary | ICD-10-CM

## 2024-06-12 RX ORDER — VIT C/E/ZN/COPPR/LUTEIN/ZEAXAN 250MG-90MG
2000 CAPSULE ORAL DAILY
Qty: 60 CAPSULE | Refills: 12 | Status: SHIPPED | OUTPATIENT
Start: 2024-06-12

## 2024-06-12 NOTE — TELEPHONE ENCOUNTER
Per your request in lab comment, pt has provided the name of her pharmacy choice for the Vit D med you mentioned prescribing.    Previous med in chart pended for your review    Lab comment for your review     Very low vitamin-D.  Other labs are acceptable.  I recommend prescription vitamin-D ergocalciferol 2 times a week.  Please remind me which pharmacy you use and I will send it to the pharmacy.  I also want you to continue with the over-the-counter cholecalciferol 4000 IU daily

## 2024-06-12 NOTE — TELEPHONE ENCOUNTER
No care due was identified.  [facility] Embedded Care Due Messages. Reference number: [id number].   6/12/2024 3:00:46 PM CDT

## 2024-06-18 ENCOUNTER — CLINICAL SUPPORT (OUTPATIENT)
Dept: DIABETES | Facility: CLINIC | Age: 54
End: 2024-06-18
Payer: COMMERCIAL

## 2024-06-18 DIAGNOSIS — R73.03 PREDIABETES: Primary | ICD-10-CM

## 2024-06-18 PROCEDURE — G0108 DIAB MANAGE TRN  PER INDIV: HCPCS | Mod: 95,,, | Performed by: INTERNAL MEDICINE

## 2024-06-20 NOTE — PROGRESS NOTES
Diabetes Care Specialist Virtual Visit Note     The patient location is: [place]  The chief complaint leading to consultation is: Diabetes  Visit type: audiovisual  Total time spent with patient: 30 min   Each patient to whom he or she provides medical services by telemedicine is:  (1) informed of the relationship between the physician and patient and the respective role of any other health care provider with respect to management of the patient; and (2) notified that he or she may decline to receive medical services by telemedicine and may withdraw from such care at any time.    Diabetes Care Specialist Progress Note  Author: Elsie Burks RN, CDE  Date: 6/20/2024    Program Intake  Reason for Diabetes Program Visit:: Intervention  Type of Intervention:: Individual  Individual: Education  Education: Self-Management Skill Review, Nutrition and Meal Planning  Current diabetes risk level:: low  In the last 12 months, have you:: none  Continuous Glucose Monitoring  Patient has CGM: No    Lab Results   Component Value Date    HGBA1C 6.2 (H) 06/07/2024     Clinical    Nutritional Status  Meal Plan 24 Hour Recall - Breakfast: black coffee - (still going to [employer]) - 1 biscuit w/ grape jelly, sometimes scrambled eggs (is not doing the large country breakfast)  Meal Plan 24 Hour Recall - Lunch: broccoli, smoked sausage  Meal Plan 24 Hour Recall - Dinner: fish (tuna/salmon) w/ veggies  Meal Plan 24 Hour Recall - Snack: still not snacking; unsalted nuts if does get hungry    Diabetes Self-Management Skills Assessment      Psychosocial/Coping  Patient can identify ways of coping with chronic disease.: yes  Patient-stated ways of coping with chronic disease:: counseling/actively seeing behavioral professional, support from loved ones  Psychosocial/Coping Skills Assessment Completed: : Yes  Assessment indicates:: Knowledge deficit, Instruction Needed  Area of need?: Yes    Assessment Summary and Plan    Based on today's diabetes  care assessment, the following areas of need were identified:          5/7/2024    12:01 AM   Social   Access to Mass Media/Tech No   Cognitive/Behavioral Health No   Culture/Restorationist No   Communication No   Health Literacy No         5/7/2024    12:01 AM   Clinical   Nutritional Status No          6/18/2024    12:01 AM   Diabetes Self-Management Skills   Psychosocial/Coping Yes: see care planning; discussed coping strategies in regards to stress-eating habits.      Today's interventions were provided through individual discussion, instruction, and written materials were provided.      Patient verbalized understanding of instruction and written materials.  Pt was able to return back demonstration of instructions today. Patient understood key points, needs reinforcement and further instruction.     Diabetes Self-Management Care Plan:    Today's Diabetes Self-Management Care Plan was developed with Caryn's input. Caryn has agreed to work toward the following goal(s) to improve his/her overall diabetes control.      Care Plan: Diabetes Management   Updates made since 5/21/2024 12:00 AM        Problem: Healthy Eating         Goal: Pt will work towards consuming 30-45 grams of carbs with each meal (or 1 fist sized serving).    Start Date: 5/7/2024   Expected End Date: 6/8/2024   This Visit's Progress: On track   Priority: High   Barriers: No Barriers Identified   Note:    6/18/24  Pt has been making significant changes to portions. At breakfast has been getting veggie omelet, egg scramble or 1 biscuit. Is not drinking coffee black. Has membership to [employer] and working on increased non-starchy veggie intake. Has almost 100% cut out sweet drinks! Increasing water intake as well. Pt discussing stress-eating habits; mother had surgery this month and pt noted more stress eating. Discussed alternatives to stress-eating to help cope with various situations and help w/ staying on track.        Follow Up Plan     Follow up  in about 4 weeks (around 7/16/2024) for review of progress towards goals..    Today's care plan and follow up schedule was discussed with patient.  Caryn verbalized understanding of the care plan, goals, and agrees to follow up plan.        The patient was encouraged to communicate with his/her health care provider/physician and care team regarding his/her condition(s) and treatment.  I provided the patient with my contact information today and encouraged to contact me via phone or [facility] as needed.     Length of Visit   Total Time: 30 Minutes

## 2024-07-29 ENCOUNTER — PATIENT MESSAGE (OUTPATIENT)
Dept: DIABETES | Facility: CLINIC | Age: 54
End: 2024-07-29
Payer: COMMERCIAL

## 2024-09-17 ENCOUNTER — CLINICAL SUPPORT (OUTPATIENT)
Dept: DIABETES | Facility: CLINIC | Age: 54
End: 2024-09-17
Payer: COMMERCIAL

## 2024-09-17 VITALS — BODY MASS INDEX: 37.09 KG/M2 | HEIGHT: 70 IN | WEIGHT: 259.06 LBS

## 2024-09-17 DIAGNOSIS — R73.03 PREDIABETES: Primary | ICD-10-CM

## 2024-09-17 PROCEDURE — G0108 DIAB MANAGE TRN  PER INDIV: HCPCS | Mod: S$GLB,,,

## 2024-09-17 PROCEDURE — 99999 PR PBB SHADOW E&M-EST. PATIENT-LVL II: CPT | Mod: PBBFAC,,,
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
## 2024-09-19 ENCOUNTER — LAB VISIT (OUTPATIENT)
Dept: LAB | Facility: HOSPITAL | Age: 54
End: 2024-09-19
Payer: COMMERCIAL

## 2024-09-19 ENCOUNTER — OFFICE VISIT (OUTPATIENT)
Dept: INTERNAL MEDICINE | Facility: CLINIC | Age: 54
End: 2024-09-19
Payer: COMMERCIAL

## 2024-09-19 VITALS
SYSTOLIC BLOOD PRESSURE: 98 MMHG | OXYGEN SATURATION: 99 % | WEIGHT: 258.38 LBS | HEIGHT: 70 IN | HEART RATE: 80 BPM | DIASTOLIC BLOOD PRESSURE: 76 MMHG | BODY MASS INDEX: 36.99 KG/M2

## 2024-09-19 DIAGNOSIS — L29.9 PRURITUS: Primary | ICD-10-CM

## 2024-09-19 DIAGNOSIS — R73.01 IFG (IMPAIRED FASTING GLUCOSE): ICD-10-CM

## 2024-09-19 DIAGNOSIS — K76.0 FATTY LIVER: ICD-10-CM

## 2024-09-19 DIAGNOSIS — F33.41 RECURRENT MAJOR DEPRESSIVE DISORDER, IN PARTIAL REMISSION: ICD-10-CM

## 2024-09-19 DIAGNOSIS — R51.9 NONINTRACTABLE EPISODIC HEADACHE, UNSPECIFIED HEADACHE TYPE: ICD-10-CM

## 2024-09-19 DIAGNOSIS — L29.9 PRURITUS: ICD-10-CM

## 2024-09-19 LAB
ALBUMIN SERPL BCP-MCNC: 3.7 G/DL (ref 3.5–5.2)
ALP SERPL-CCNC: 82 U/L (ref 55–135)
ALT SERPL W/O P-5'-P-CCNC: 22 U/L (ref 10–44)
ANION GAP SERPL CALC-SCNC: 9 MMOL/L (ref 8–16)
AST SERPL-CCNC: 23 U/L (ref 10–40)
BASOPHILS # BLD AUTO: 0.02 K/UL (ref 0–0.2)
BASOPHILS NFR BLD: 0.4 % (ref 0–1.9)
BILIRUB SERPL-MCNC: 0.4 MG/DL (ref 0.1–1)
BUN SERPL-MCNC: 16 MG/DL (ref 6–20)
CALCIUM SERPL-MCNC: 9.6 MG/DL (ref 8.7–10.5)
CHLORIDE SERPL-SCNC: 103 MMOL/L (ref 95–110)
CO2 SERPL-SCNC: 26 MMOL/L (ref 23–29)
CREAT SERPL-MCNC: 0.9 MG/DL (ref 0.5–1.4)
DIFFERENTIAL METHOD BLD: ABNORMAL
EOSINOPHIL # BLD AUTO: 0.2 K/UL (ref 0–0.5)
EOSINOPHIL NFR BLD: 4.8 % (ref 0–8)
ERYTHROCYTE [DISTWIDTH] IN BLOOD BY AUTOMATED COUNT: 12 % (ref 11.5–14.5)
EST. GFR  (NO RACE VARIABLE): >60 ML/MIN/1.73 M^2
ESTIMATED AVG GLUCOSE: 134 MG/DL (ref 68–131)
GLUCOSE SERPL-MCNC: 103 MG/DL (ref 70–110)
HBA1C MFR BLD: 6.3 % (ref 4–5.6)
HCT VFR BLD AUTO: 41.6 % (ref 37–48.5)
HGB BLD-MCNC: 13.1 G/DL (ref 12–16)
IMM GRANULOCYTES # BLD AUTO: 0.02 K/UL (ref 0–0.04)
IMM GRANULOCYTES NFR BLD AUTO: 0.4 % (ref 0–0.5)
LYMPHOCYTES # BLD AUTO: 1.9 K/UL (ref 1–4.8)
LYMPHOCYTES NFR BLD: 37.9 % (ref 18–48)
MCH RBC QN AUTO: 29.3 PG (ref 27–31)
MCHC RBC AUTO-ENTMCNC: 31.5 G/DL (ref 32–36)
MCV RBC AUTO: 93 FL (ref 82–98)
MONOCYTES # BLD AUTO: 0.3 K/UL (ref 0.3–1)
MONOCYTES NFR BLD: 6.6 % (ref 4–15)
NEUTROPHILS # BLD AUTO: 2.5 K/UL (ref 1.8–7.7)
NEUTROPHILS NFR BLD: 49.9 % (ref 38–73)
NRBC BLD-RTO: 0 /100 WBC
PLATELET # BLD AUTO: 257 K/UL (ref 150–450)
PMV BLD AUTO: 9.5 FL (ref 9.2–12.9)
POTASSIUM SERPL-SCNC: 3.4 MMOL/L (ref 3.5–5.1)
PROT SERPL-MCNC: 6.6 G/DL (ref 6–8.4)
RBC # BLD AUTO: 4.47 M/UL (ref 4–5.4)
SODIUM SERPL-SCNC: 138 MMOL/L (ref 136–145)
WBC # BLD AUTO: 4.99 K/UL (ref 3.9–12.7)

## 2024-09-19 PROCEDURE — 99999 PR PBB SHADOW E&M-EST. PATIENT-LVL IV: CPT | Mod: PBBFAC,,, | Performed by: NURSE PRACTITIONER

## 2024-09-19 PROCEDURE — 80053 COMPREHEN METABOLIC PANEL: CPT | Performed by: NURSE PRACTITIONER

## 2024-09-19 PROCEDURE — 3044F HG A1C LEVEL LT 7.0%: CPT | Mod: CPTII,S$GLB,, | Performed by: NURSE PRACTITIONER

## 2024-09-19 PROCEDURE — 85025 COMPLETE CBC W/AUTO DIFF WBC: CPT | Performed by: NURSE PRACTITIONER

## 2024-09-19 PROCEDURE — 3078F DIAST BP <80 MM HG: CPT | Mod: CPTII,S$GLB,, | Performed by: NURSE PRACTITIONER

## 2024-09-19 PROCEDURE — 3008F BODY MASS INDEX DOCD: CPT | Mod: CPTII,S$GLB,, | Performed by: NURSE PRACTITIONER

## 2024-09-19 PROCEDURE — 83036 HEMOGLOBIN GLYCOSYLATED A1C: CPT | Performed by: NURSE PRACTITIONER

## 2024-09-19 PROCEDURE — 3074F SYST BP LT 130 MM HG: CPT | Mod: CPTII,S$GLB,, | Performed by: NURSE PRACTITIONER

## 2024-09-19 PROCEDURE — 99214 OFFICE O/P EST MOD 30 MIN: CPT | Mod: S$GLB,,, | Performed by: NURSE PRACTITIONER

## 2024-09-19 PROCEDURE — 1159F MED LIST DOCD IN RCRD: CPT | Mod: CPTII,S$GLB,, | Performed by: NURSE PRACTITIONER

## 2024-09-19 RX ORDER — DULOXETIN HYDROCHLORIDE 30 MG/1
60 CAPSULE, DELAYED RELEASE ORAL DAILY
Qty: 60 CAPSULE | Refills: 11 | Status: SHIPPED | OUTPATIENT
Start: 2024-09-19 | End: 2025-09-19

## 2024-09-19 RX ORDER — CETIRIZINE HYDROCHLORIDE 10 MG/1
10 TABLET ORAL DAILY
Qty: 30 TABLET | Refills: 1 | Status: SHIPPED | OUTPATIENT
Start: 2024-09-19 | End: 2025-09-19

## 2024-09-19 RX ORDER — TRIAMCINOLONE ACETONIDE 1 MG/G
OINTMENT TOPICAL 2 TIMES DAILY
Qty: 80 G | Refills: 0 | Status: SHIPPED | OUTPATIENT
Start: 2024-09-19

## 2024-09-19 RX ORDER — FLUOCINOLONE ACETONIDE 0.11 MG/ML
OIL TOPICAL 3 TIMES DAILY
Qty: 118 ML | Refills: 0 | Status: SHIPPED | OUTPATIENT
Start: 2024-09-19

## 2024-09-19 NOTE — PROGRESS NOTES
INTERNAL MEDICINE URGENT CARE NOTE    CHIEF COMPLAINT     Chief Complaint   Patient presents with    Chronic itching     Neck-scalp        HPI     Caryn Zarate is a 54 y.o. female who presents for an urgent/follow up visit today.    Itching to scalp and neck x3wks  -has tried cortisone cream to neck w/ minimal relief   -states she shampoos her hair every morning     Intermittent headaches x1wk   -come more at night making it harder for her to go to sleep   -more anxiety & feeling overwhelmed   -stopped cymbalta abt 6mos ago; re-interested in starting again to help w/ anxiety       Past Medical History:  Past Medical History:   Diagnosis Date    Depression     Fatty liver: see u/s 3/18 3/21/2018    IFG (impaired fasting glucose) 3/18/2016    Obesity     Renal cyst, left: see u/s 3/18 3/21/2018    Sleep apnea 5/26/2014    Vitamin D deficiency disease 3/18/2016       Home Medications:  Prior to Admission medications    Medication Sig Start Date End Date Taking? Authorizing Provider   cholecalciferol, vitamin D3, (VITAMIN D3) 25 mcg (1,000 unit) capsule Take 2 capsules (2,000 Units total) by mouth once daily. 6/12/24  Yes Layla Laws MD   DULoxetine (CYMBALTA) 60 MG capsule TAKE 1 CAPSULE(60 MG) BY MOUTH EVERY DAY AS NEEDED 10/10/23  Yes Layla Laws MD   meloxicam (MOBIC) 15 MG tablet Take 1 tablet (15 mg total) by mouth once daily. 1 pill per day everyday for the next 3 weeks with food 5/15/24 5/15/25 Yes Lenora Betancur DPM   methocarbamoL (ROBAXIN) 500 MG Tab Take 1 tablet (500 mg total) by mouth 4 (four) times daily. 3/7/23  Yes Virginia Pat MD   multivitamin (THERAGRAN) per tablet Take 1 tablet by mouth once daily.   Yes Provider, Historical       Review of Systems:  Review of Systems   Constitutional:  Positive for fatigue. Negative for fever.   HENT:  Negative for congestion, rhinorrhea and sore throat.    Eyes:  Negative for pain.   Respiratory:  Positive for cough (caused by  at  Referred by: Kris Millard MD; Medical Diagnosis (from order):    Diagnosis Information      Diagnosis    729.5 (ICD-9-CM) - M79.671 (ICD-10-CM) - Right foot pain                Daily Treatment Note -  Physical Therapy    Visit:  5     SUBJECTIVE                                                                                                             Pt. Reports her HEP is going well.   Pt. And mother report gait with crutch and heel walking in CAM walker is going well   Pt. Did start swimming in PE per MD orders. She is tolerating this well with the exception of the frog kick so I sent a note to  to DC this.    Functional Change: GAIT: cam walker on heel with one crutch    Pain / Symptoms:  Pain rating (out of 10): Current: 1   Location: Pt. Does still have tenderness and pain with light palpation to the plantar surface under the  intermediate and lateral cuneiforms.      OBJECTIVE                                                                                                                     Observation:   Comments / Details: Yuli demonstrates good control of axillary crutches, but due to possible safety hazard at school as well as pt. Now able to tolerate more weight through the foot with boot on, we decided to work on heel strike gait in the boot with one crutch to allow for one hand to be free for safety.      Palpation:     Comments / Details: No edema today  Pt. Is tender with palpation to planar surface of foot under mets 2 and 3  Joint Play:   Ankle/Foot:     Talocrural: right: hypomobile    Forefoot: right: hypomobile    Hallux Metacarpophalangeal: right: hypomobile      TREATMENT                                                                                                                  Therapeutic Exercise:  Seated upright bike in the boot x 10   Standing at the bar stepping on and off airex pad for balance and working on wt bearing into the R LE out of cam walker  Leg curls x 5  X  "night). Negative for shortness of breath.    Gastrointestinal:  Negative for abdominal pain, constipation, diarrhea, nausea and vomiting.   Genitourinary:  Negative for difficulty urinating, dysuria, frequency and urgency.   Skin:  Positive for rash.   Neurological:  Positive for headaches.   Psychiatric/Behavioral:  Positive for confusion. Negative for agitation and dysphoric mood. The patient is nervous/anxious.      Health Maintainence:   Immunizations:  Health Maintenance         Date Due Completion Date    Pneumococcal Vaccines (Age 0-64) (1 of 2 - PCV) Never done ---    Influenza Vaccine (1) 09/01/2024 3/8/2024 (Declined)    Override on 3/8/2024: Declined    COVID-19 Vaccine (4 - 2023-24 season) 09/01/2024 7/19/2022    Mammogram 04/19/2025 4/19/2024    Hemoglobin A1c (Prediabetes) 06/07/2025 6/7/2024    TETANUS VACCINE 10/30/2028 10/30/2018    Lipid Panel 12/07/2028 12/7/2023    Colorectal Cancer Screening 03/24/2029 3/24/2022             PHYSICAL EXAM     BP 98/76 (BP Location: Left arm, Patient Position: Sitting, BP Method: Large (Manual))   Pulse 80   Ht 5' 10" (1.778 m)   Wt 117.2 kg (258 lb 6.1 oz)   LMP  (LMP Unknown)   SpO2 99%   BMI 37.07 kg/m²     Physical Exam  Constitutional:       Appearance: She is obese.   HENT:      Head: Normocephalic and atraumatic.      Right Ear: Tympanic membrane, ear canal and external ear normal.      Left Ear: Tympanic membrane, ear canal and external ear normal.      Nose: Nose normal.      Mouth/Throat:      Mouth: Mucous membranes are moist.      Pharynx: Oropharynx is clear.   Eyes:      Extraocular Movements: Extraocular movements intact.      Conjunctiva/sclera: Conjunctivae normal.      Pupils: Pupils are equal, round, and reactive to light.   Cardiovascular:      Rate and Rhythm: Normal rate and regular rhythm.      Pulses: Normal pulses.      Heart sounds: Normal heart sounds.   Pulmonary:      Effort: Pulmonary effort is normal.      Breath sounds: Normal " 15#  Leg EXT x 25 # x 15  Mini squats with L LE wt bearing 90 %   Bridges with blue Tband  Calf stretch  Resisted yellow swiss ball rolling in all planes for foot  Attempted DF with yellow - will not issue to HEP  -slight pain  HS curls using yellow swiss ball   Neuromuscular Re-Education:  Performed and added to HEP ( see below^)    Single leg airex compression steps forward side and back heel only  Single leg balance on R LE with hip EXT and ABD slides  Standing hip machine 30# x 12 for ABD, FLEX and EXT on L LE  Mini squats at pulleys with weight shifting onto L LE in cam walker  rebounder throwing out of boot for weight shifting    Skilled input: verbal instruction/cues, tactile instruction/cues and facilitation    Home Exercise Program: (*above indicates provided as part of home exercise program)  *  Ankle DF with foot elevated  HIP ABD and SLR  Seated heel raises no weight  Towel crunches  L LE sit to stand  Circulation exercises for home - alphabet when out of boot   bridges  [place] tband pF and DF AROM with band assist for DF  Seated bike at home  Runner stretch gastroc  Bridges with blue Tband      ASSESSMENT                                                                                                                 Pt. Tolerated RX well with no c/o pain and was easily advanced to one crutch use with heel .     Continue to progress LE strength, balance and gait as bridget with weight on heel     Pain/symptoms after session: 1  Patient Education:   Results of above outlined education: Verbalizes understanding, Demonstrates understanding and Needs reinforcement   PLAN                                                                                                                             Suggestions for next session as indicated: Progress per plan of care, discuss WB plan with MD, progress LE strength and balance as able based upon ortho plan  8 additional visits approved.     GOALS                                                                                                                        Long Term Goals: To be met by end of plan of care:      Home Exercise Program: Independent with progressed and modified home exercise program (HEP)      Status:  Met     Pain: Decrease pain/symptoms to 0     Status:  Partially met   Strength: Improve involved strength to  4-    Status:  Partially met   Range of Motion: Improve involved range of motion (ROM) to   DF: increase by 5 degrees and DF by 5 degrees without increased pain.     Status:  Partially met     Ambulation: Demonstrate ability to negotiate level and unlevel surfaces at variable velocities, including change of direction without increased pain or instability to return to age appropriate and community activities at prior level of function     Status:  Not met  Status Details: Pt. Still has to progress out of CAM walker and return to normal ROM and STR before gait will return to fully functional for age appropriate activities.       Procedures and total treatment time documented Time Entry flowsheet.     breath sounds.   Abdominal:      General: Abdomen is flat. Bowel sounds are normal.      Palpations: Abdomen is soft.   Musculoskeletal:         General: Normal range of motion.      Cervical back: Normal range of motion and neck supple.   Skin:     General: Skin is warm and dry.      Capillary Refill: Capillary refill takes less than 2 seconds.             Comments: Dry skin patch to back of left ear    Neurological:      General: No focal deficit present.      Mental Status: She is alert and oriented to person, place, and time. Mental status is at baseline.   Psychiatric:         Mood and Affect: Mood normal.         Behavior: Behavior normal.         Thought Content: Thought content normal.         Judgment: Judgment normal.       LABS     Lab Results   Component Value Date    HGBA1C 6.2 (H) 06/07/2024     CMP  Sodium   Date Value Ref Range Status   12/07/2023 140 136 - 145 mmol/L Final     Potassium   Date Value Ref Range Status   12/07/2023 4.1 3.5 - 5.1 mmol/L Final     Chloride   Date Value Ref Range Status   12/07/2023 103 95 - 110 mmol/L Final     CO2   Date Value Ref Range Status   12/07/2023 26 23 - 29 mmol/L Final     Glucose   Date Value Ref Range Status   12/07/2023 113 (H) 70 - 110 mg/dL Final     BUN   Date Value Ref Range Status   12/07/2023 12 6 - 20 mg/dL Final     Creatinine   Date Value Ref Range Status   12/07/2023 0.9 0.5 - 1.4 mg/dL Final     Calcium   Date Value Ref Range Status   12/07/2023 9.3 8.7 - 10.5 mg/dL Final     Total Protein   Date Value Ref Range Status   12/07/2023 6.6 6.0 - 8.4 g/dL Final     Albumin   Date Value Ref Range Status   12/07/2023 3.7 3.5 - 5.2 g/dL Final     Total Bilirubin   Date Value Ref Range Status   12/07/2023 0.6 0.1 - 1.0 mg/dL Final     Comment:     For infants and newborns, interpretation of results should be based  on gestational age, weight and in agreement with clinical  observations.    Premature Infant recommended reference ranges:  Up to 24  hours.............<8.0 mg/dL  Up to 48 hours............<12.0 mg/dL  3-5 days..................<15.0 mg/dL  6-29 days.................<15.0 mg/dL       Alkaline Phosphatase   Date Value Ref Range Status   12/07/2023 75 55 - 135 U/L Final     AST   Date Value Ref Range Status   12/07/2023 25 10 - 40 U/L Final     ALT   Date Value Ref Range Status   12/07/2023 26 10 - 44 U/L Final     Anion Gap   Date Value Ref Range Status   12/07/2023 11 8 - 16 mmol/L Final     eGFR if    Date Value Ref Range Status   07/12/2022 >60.0 >60 mL/min/1.73 m^2 Final     eGFR if non    Date Value Ref Range Status   07/12/2022 >60.0 >60 mL/min/1.73 m^2 Final     Comment:     Calculation used to obtain the estimated glomerular filtration  rate (eGFR) is the CKD-EPI equation.        Lab Results   Component Value Date    WBC 4.77 12/07/2023    HGB 13.9 12/07/2023    HCT 39.2 12/07/2023    MCV 87 12/07/2023     12/07/2023     Lab Results   Component Value Date    CHOL 225 (H) 12/07/2023    CHOL 217 (H) 07/12/2022    CHOL 192 03/12/2020     Lab Results   Component Value Date    HDL 71 12/07/2023    HDL 79 (H) 07/12/2022    HDL 65 03/12/2020     Lab Results   Component Value Date    LDLCALC 137.4 12/07/2023    LDLCALC 129.0 07/12/2022    LDLCALC 114.0 03/12/2020     Lab Results   Component Value Date    TRIG 83 12/07/2023    TRIG 45 07/12/2022    TRIG 65 03/12/2020     Lab Results   Component Value Date    CHOLHDL 31.6 12/07/2023    CHOLHDL 36.4 07/12/2022    CHOLHDL 33.9 03/12/2020     Lab Results   Component Value Date    TSH 1.770 12/07/2023       ASSESSMENT/PLAN     Caryn Tessa is a 54 y.o. female     Pruritus- new onset; start zrytec daily; kenalog lotion to neck & fluocinolone oil to hair; decrease shampooing; check labs   -     CBC Auto Differential; Future; Expected date: 09/19/2024  -     Comprehensive Metabolic Panel; Future; Expected date: 09/19/2024  -     Hemoglobin A1C; Future; Expected date:  09/19/2024  -     triamcinolone acetonide 0.1% (KENALOG) 0.1 % ointment; Apply topically 2 (two) times daily.  Dispense: 80 g; Refill: 0  -     cetirizine (ZYRTEC) 10 MG tablet; Take 1 tablet (10 mg total) by mouth once daily.  Dispense: 30 tablet; Refill: 1  -     fluocinolone (DERMA-SMOOTHE) 0.01 % external oil; Apply topically 3 (three) times daily.  Dispense: 118 mL; Refill: 0    Recurrent major depressive disorder, in partial remission- worsening w/ anxiety; restart cymbalta daily  -     DULoxetine (CYMBALTA) 30 MG capsule; Take 2 capsules (60 mg total) by mouth once daily.  Dispense: 60 capsule; Refill: 11    Fatty liver: see u/s 3/18: fibroscan 12/18 also 2020 F2S3- recheck CMP    IFG (impaired fasting glucose)- recheck A1C    Nonintractable episodic headache, unspecified headache type- new onset; restart cymbalta for anxiety     I hereby acknowledge that I am relying upon documentation authored by a NP student working under my supervision and further I hereby attest that I have verified the student documentation or findings by personally re-performing the physical exam and medical decision making activities of the Evaluation and Management service to be billed.  Carleen Neri        Follow up with PCP     Patient education provided from [facility]. Patient was counseled on when and how to seek emergent care.       Carleen IYER, APRN, FNP-c   Department of Internal Medicine - [facility]  3:08 PM

## 2024-09-20 DIAGNOSIS — E87.6 HYPOKALEMIA: Primary | ICD-10-CM

## 2024-10-28 ENCOUNTER — OFFICE VISIT (OUTPATIENT)
Dept: HEPATOLOGY | Facility: CLINIC | Age: 54
End: 2024-10-28
Payer: COMMERCIAL

## 2024-10-28 VITALS — BODY MASS INDEX: 37.28 KG/M2 | WEIGHT: 260.38 LBS | HEIGHT: 70 IN

## 2024-10-28 DIAGNOSIS — K76.0 FATTY LIVER: ICD-10-CM

## 2024-10-28 DIAGNOSIS — E66.01 SEVERE OBESITY (BMI 35.0-35.9 WITH COMORBIDITY): Primary | ICD-10-CM

## 2024-10-28 PROCEDURE — 99215 OFFICE O/P EST HI 40 MIN: CPT | Mod: S$GLB,,, | Performed by: PHYSICIAN ASSISTANT

## 2024-10-28 PROCEDURE — 3008F BODY MASS INDEX DOCD: CPT | Mod: CPTII,S$GLB,, | Performed by: PHYSICIAN ASSISTANT

## 2024-10-28 PROCEDURE — 99999 PR PBB SHADOW E&M-EST. PATIENT-LVL IV: CPT | Mod: PBBFAC,,, | Performed by: PHYSICIAN ASSISTANT

## 2024-10-28 PROCEDURE — 3044F HG A1C LEVEL LT 7.0%: CPT | Mod: CPTII,S$GLB,, | Performed by: PHYSICIAN ASSISTANT

## 2024-10-28 PROCEDURE — 1159F MED LIST DOCD IN RCRD: CPT | Mod: CPTII,S$GLB,, | Performed by: PHYSICIAN ASSISTANT

## 2024-10-28 PROCEDURE — 1160F RVW MEDS BY RX/DR IN RCRD: CPT | Mod: CPTII,S$GLB,, | Performed by: PHYSICIAN ASSISTANT

## 2024-11-01 ENCOUNTER — PROCEDURE VISIT (OUTPATIENT)
Dept: HEPATOLOGY | Facility: CLINIC | Age: 54
End: 2024-11-01
Payer: COMMERCIAL

## 2024-11-01 DIAGNOSIS — K76.0 FATTY LIVER: ICD-10-CM

## 2024-11-01 PROCEDURE — 91200 LIVER ELASTOGRAPHY: CPT | Mod: S$GLB,,, | Performed by: PHYSICIAN ASSISTANT

## 2024-11-04 NOTE — PROCEDURES
FibroScan Other Locations    Date/Time: 11/1/2024 8:45 AM    Performed by: Scheuermann, Jennifer B., PA  Authorized by: Scheuermann, Jennifer B., PA    Probe:     Universal Protocol: Patient's identity, procedure and site were verified, confirmatory pause was performed.  Discussed procedure including risks and potential complications.  Questions answered.  Patient verbalizes understanding and wishes to proceed with VCTE.     Procedure: After providing explanations of the procedure, patient was placed in the supine position with right arm in maximum abduction to allow optimal exposure of right lateral abdomen.  Patient was briefly assessed, Testing was performed in the mid-axillary location, 50Hz Shear Wave pulses were applied and the resulting Shear Wave and Propagation Speed detected with a 3.5 MHz ultrasonic signal, using the FibroScan probe, Skin to liver capsule distance and liver parenchyma were accessed during the entire examination with the FibroScan probe, Patient was instructed to breathe normally and to abstain from sudden movements during the procedure, allowing for random measurements of liver stiffness. At least 10 Shear Waves were produced, Individual measurements of each Shear Wave were calculated.  Patient tolerated the procedure well with no complications.  Meets discharge criteria as was dismissed.  Rates pain 0 out of 10.  Patient will follow up with ordering provider to review results.

## 2024-11-04 NOTE — PROCEDURES
FibroScan Transplant Hepatology(Vibration Controlled Transient Elastography)    Date/Time: 11/1/2024 8:45 AM    Performed by: Scheuermann, Jennifer B., PA  Authorized by: Scheuermann, Jennifer B., PA    Diagnosis:  NAFLD    Probe:  XL    Universal Protocol: Patient's identity, procedure and site were verified, confirmatory pause was performed.  Discussed procedure including risks and potential complications.  Questions answered.  Patient verbalizes understanding and wishes to proceed with VCTE.     Procedure: After providing explanations of the procedure, patient was placed in the supine position with right arm in maximum abduction to allow optimal exposure of right lateral abdomen.  Patient was briefly assessed, Testing was performed in the mid-axillary location, 50Hz Shear Wave pulses were applied and the resulting Shear Wave and Propagation Speed detected with a 3.5 MHz ultrasonic signal, using the FibroScan probe, Skin to liver capsule distance and liver parenchyma were accessed during the entire examination with the FibroScan probe, Patient was instructed to breathe normally and to abstain from sudden movements during the procedure, allowing for random measurements of liver stiffness. At least 10 Shear Waves were produced, Individual measurements of each Shear Wave were calculated.  Patient tolerated the procedure well with no complications.  Meets discharge criteria as was dismissed.  Rates pain 0 out of 10.  Patient will follow up with ordering provider to review results.    Findings  Median liver stiffness score:  5.6  CAP Reading: dB/m:  348    IQR/med %:  17  Interpretation  Fibrosis interpretation is based on medial liver stiffness - Kilopascal (kPa).    Fibrosis Stage:  F 0-1 (accuracy in question based on review of images)  Steatosis interpretation is based on controlled attenuation parameter - (dB/m).    Steatosis Grade:  S3

## 2024-11-05 ENCOUNTER — TELEPHONE (OUTPATIENT)
Dept: HEPATOLOGY | Facility: CLINIC | Age: 54
End: 2024-11-05
Payer: COMMERCIAL

## 2024-11-05 NOTE — TELEPHONE ENCOUNTER
----- Message from Jennifer Scheuermann, PA sent at 11/4/2024  5:18 PM CST -----  Pls schedule in 9 months: fibroscan and visit  thanks

## 2024-11-21 ENCOUNTER — LAB VISIT (OUTPATIENT)
Dept: LAB | Facility: HOSPITAL | Age: 54
End: 2024-11-21
Attending: NURSE PRACTITIONER
Payer: COMMERCIAL

## 2024-11-21 DIAGNOSIS — R73.03 PREDIABETES: ICD-10-CM

## 2024-11-21 DIAGNOSIS — E87.6 HYPOKALEMIA: ICD-10-CM

## 2024-11-21 DIAGNOSIS — R73.03 PREDIABETES: Primary | ICD-10-CM

## 2024-11-21 LAB
ESTIMATED AVG GLUCOSE: 128 MG/DL (ref 68–131)
HBA1C MFR BLD: 6.1 % (ref 4–5.6)
POTASSIUM SERPL-SCNC: 4.1 MMOL/L (ref 3.5–5.1)

## 2024-11-21 PROCEDURE — 36415 COLL VENOUS BLD VENIPUNCTURE: CPT | Performed by: INTERNAL MEDICINE

## 2024-11-21 PROCEDURE — 83036 HEMOGLOBIN GLYCOSYLATED A1C: CPT | Performed by: INTERNAL MEDICINE

## 2024-11-21 PROCEDURE — 36415 COLL VENOUS BLD VENIPUNCTURE: CPT | Performed by: NURSE PRACTITIONER

## 2024-11-21 PROCEDURE — 84132 ASSAY OF SERUM POTASSIUM: CPT | Performed by: NURSE PRACTITIONER

## 2024-12-09 ENCOUNTER — PATIENT MESSAGE (OUTPATIENT)
Dept: INTERNAL MEDICINE | Facility: CLINIC | Age: 54
End: 2024-12-09
Payer: COMMERCIAL

## 2025-04-07 ENCOUNTER — TELEPHONE (OUTPATIENT)
Dept: HEPATOLOGY | Facility: CLINIC | Age: 55
End: 2025-04-07
Payer: COMMERCIAL

## 2025-04-07 NOTE — TELEPHONE ENCOUNTER
Patient scheduled for a visit with PA Scheuermann and a fibroscan on 8/4/25.  Provider not in clinic that day.  Attempt made to reach patient by phone.  This info left on her voicemail.  I stressed that scheduled appointments had to be moved to 8/7/25; appointment reminder notice mailed along with this note.

## 2025-05-13 ENCOUNTER — OFFICE VISIT (OUTPATIENT)
Dept: FAMILY MEDICINE | Facility: CLINIC | Age: 55
End: 2025-05-13
Payer: COMMERCIAL

## 2025-05-13 ENCOUNTER — TELEPHONE (OUTPATIENT)
Dept: FAMILY MEDICINE | Facility: CLINIC | Age: 55
End: 2025-05-13
Payer: COMMERCIAL

## 2025-05-13 VITALS — WEIGHT: 272 LBS | BODY MASS INDEX: 39.03 KG/M2 | SYSTOLIC BLOOD PRESSURE: 120 MMHG | DIASTOLIC BLOOD PRESSURE: 65 MMHG

## 2025-05-13 DIAGNOSIS — S09.90XD INJURY OF HEAD, SUBSEQUENT ENCOUNTER: Primary | ICD-10-CM

## 2025-05-13 DIAGNOSIS — Z00.00 ANNUAL PHYSICAL EXAM: Primary | ICD-10-CM

## 2025-05-13 DIAGNOSIS — R73.01 IFG (IMPAIRED FASTING GLUCOSE): ICD-10-CM

## 2025-05-13 DIAGNOSIS — E66.01 SEVERE OBESITY (BMI 35.0-35.9 WITH COMORBIDITY): ICD-10-CM

## 2025-05-13 DIAGNOSIS — G47.33 OBSTRUCTIVE SLEEP APNEA SYNDROME: ICD-10-CM

## 2025-05-13 PROCEDURE — 99999 PR PBB SHADOW E&M-EST. PATIENT-LVL IV: CPT | Mod: PBBFAC,,, | Performed by: INTERNAL MEDICINE

## 2025-05-13 NOTE — PROGRESS NOTES
Patient ID: Caryn Zarate is a 54 y.o. female.    Chief Complaint: Head Injury      Assessment:       1. Injury of head, subsequent encounter    2. Severe obesity (BMI 35.0-35.9 with comorbidity)    3. IFG (impaired fasting glucose)    4. Obstructive sleep apnea syndrome       Plan:           1. Injury of head, subsequent encounter:  No alarm symptoms.  Likely with mild concussion.  Alarm symptoms and ED cautions reviewed.  Head CT.  Natural history discussed.  -     CT Head Without Contrast; Future; Expected date: 05/13/2025    2. Severe obesity (BMI 35.0-35.9 with comorbidity):  BRYCE issues reviewed.  Despite the fact that her home sleep study showed mild apnea in 2021, I would recommend having this formally evaluated given her BMI of 39.  She is willing to consider another inpatient sleep study and evaluation in the Sleep Medicine Department.  Referral placed.    3. IFG (impaired fasting glucose):  She is trying to work on a healthy eating plan, exercise.  Will be due for full labs and annual exam which will be scheduled at a separate time.  No alarm symptoms currently    4. Obstructive sleep apnea syndrome  -     Ambulatory referral/consult to Sleep Disorders; Future; Expected date: 05/20/2025     I will review all studies and determine further tx depending on findings  Visit today manifests increased complexity associated with the care of the multiple chronic and episodic problems I addressed.  I am managing a longitudinal care plan of the patient due to the serious and complex problems listed above.    Subjective:   History of Present Illness    CHIEF COMPLAINT:  Patient presents today for evaluation of head injury and associated symptoms.    HEAD INJURY:  She sustained a head injury on May 5th after hitting her head twice in the same location on a car trunk while at a craft show. She experiences word-finding difficulties and reports intermittent throbbing at the injury site. She denies loss of consciousness,  blurry vision, changes in eyesight, and weakness or numbness in extremities.    SLEEP HISTORY:  She has a history of sleep apnea with previous sleep studies including a home study in May 2021 and facility-based study in 2014. She discontinued CPAP use due to feeling like she was choking, causing her to remove the mask. Her daughter reports snoring that is not severely loud. She wakes up feeling tired. She requires background noise (such as nature sounds) and some light while sleeping due to fear of complete darkness. She is amenable to a facility-based sleep study if needed.    SOCIAL HISTORY:  She lives with her youngest daughter.      ROS:  General: -fever, -chills, +fatigue, -weight gain, -weight loss  Eyes: -vision changes, -redness, -discharge  ENT: -ear pain, -nasal congestion, -sore throat  Cardiovascular: -chest pain, -palpitations, -lower extremity edema  Respiratory: -cough, -shortness of breath, +snoring  Gastrointestinal: -abdominal pain, -nausea, -vomiting, -diarrhea, -constipation, -blood in stool  Genitourinary: -dysuria, -hematuria, -frequency  Musculoskeletal: -joint pain, -muscle pain  Skin: -rash, -lesion  Neurological: +headache, -dizziness, -numbness, -tingling, +speech difficulty  Psychiatric: -anxiety, -depression, -sleep difficulty          Patient Active Problem List  Diagnosis    Recurrent major depressive disorder, in partial remission    Sleep apnea: needs CPAP 10 per sleep study 6/14- declines tx; mild per HST 5/21    IFG (impaired fasting glucose)    Vitamin D deficiency disease    Fatty liver: see u/s 3/18: fibroscan 11/24 F0-1S3    Renal cyst, left: see u/s 3/18; stable on CT 4/21    Pelvic floor dysfunction    Mixed hyperlipidemia    Chronic midline low back pain without sciatica    Severe obesity (BMI 35.0-35.9 with comorbidity)    Impaired functional mobility and activity tolerance    Lumbar radiculopathy, chronic          Objective:      Physical Exam  Vitals and nursing note  reviewed.   Constitutional:       Appearance: She is well-developed.   HENT:      Head: Normocephalic and atraumatic.      Right Ear: External ear normal.      Left Ear: External ear normal.      Nose: Nose normal.      Mouth/Throat:      Pharynx: No oropharyngeal exudate.   Eyes:      General: No scleral icterus.     Extraocular Movements: Extraocular movements intact.      Conjunctiva/sclera: Conjunctivae normal.   Neck:      Thyroid: No thyromegaly.      Vascular: No JVD.   Cardiovascular:      Rate and Rhythm: Normal rate and regular rhythm.      Heart sounds: Normal heart sounds. No murmur heard.     No gallop.   Pulmonary:      Effort: Pulmonary effort is normal. No respiratory distress.      Breath sounds: Normal breath sounds. No wheezing.   Abdominal:      General: Bowel sounds are normal. There is no distension.      Palpations: Abdomen is soft. There is no mass.      Tenderness: There is no abdominal tenderness. There is no guarding or rebound.   Musculoskeletal:         General: No tenderness. Normal range of motion.      Cervical back: Normal range of motion and neck supple.   Lymphadenopathy:      Cervical: No cervical adenopathy.   Skin:     General: Skin is warm.      Findings: No erythema or rash.   Neurological:      General: No focal deficit present.      Mental Status: She is alert and oriented to person, place, and time.      Cranial Nerves: No cranial nerve deficit.      Sensory: No sensory deficit.      Motor: No weakness.      Coordination: Coordination normal.      Gait: Gait normal.      Deep Tendon Reflexes: Reflexes normal.   Psychiatric:         Behavior: Behavior normal.         Thought Content: Thought content normal.         Judgment: Judgment normal.             Health Maintenance Due   Topic Date Due    Pneumococcal Vaccines (Age 50+) (1 of 2 - PCV) Never done    COVID-19 Vaccine (4 - 2024-25 season) 09/01/2024    Mammogram  04/19/2025        This note was generated with the  assistance of ambient listening technology. Verbal consent was obtained by the patient and accompanying visitor(s) for the recording of patient appointment to facilitate this note. I attest to having reviewed and edited the generated note for accuracy, though some syntax or spelling errors may persist. Please contact the author of this note for any clarification.

## 2025-05-13 NOTE — TELEPHONE ENCOUNTER
Please schedule for fasting labs.  Can do her annual exam as a virtual visit with me in the next 1-2 months after her labs are completed thank you

## 2025-05-14 ENCOUNTER — RESULTS FOLLOW-UP (OUTPATIENT)
Dept: FAMILY MEDICINE | Facility: CLINIC | Age: 55
End: 2025-05-14

## 2025-05-14 ENCOUNTER — HOSPITAL ENCOUNTER (OUTPATIENT)
Dept: RADIOLOGY | Facility: HOSPITAL | Age: 55
Discharge: HOME | End: 2025-05-14
Attending: INTERNAL MEDICINE
Payer: COMMERCIAL

## 2025-05-14 DIAGNOSIS — S09.90XD INJURY OF HEAD, SUBSEQUENT ENCOUNTER: ICD-10-CM

## 2025-05-14 PROCEDURE — 70450 CT HEAD/BRAIN W/O DYE: CPT | Mod: 26,,, | Performed by: RADIOLOGY

## 2025-05-14 PROCEDURE — 70450 CT HEAD/BRAIN W/O DYE: CPT | Mod: TC

## 2025-05-20 ENCOUNTER — TELEPHONE (OUTPATIENT)
Dept: FAMILY MEDICINE | Facility: CLINIC | Age: 55
End: 2025-05-20

## 2025-05-20 NOTE — TELEPHONE ENCOUNTER
I am not sure what happened with her appointment today, please call to reschedule, to go over her lipids and labs    Not urgent    thanks

## 2025-05-21 NOTE — TELEPHONE ENCOUNTER
Lvm for pt to contact office to see if she had her VV with PCP because PCP sent a message saying see what happened to appt.

## 2025-06-02 ENCOUNTER — OFFICE VISIT (OUTPATIENT)
Dept: FAMILY MEDICINE | Facility: CLINIC | Age: 55
End: 2025-06-02
Payer: COMMERCIAL

## 2025-06-02 ENCOUNTER — HOSPITAL ENCOUNTER (OUTPATIENT)
Dept: RADIOLOGY | Facility: HOSPITAL | Age: 55
Discharge: HOME | End: 2025-06-02
Attending: INTERNAL MEDICINE
Payer: COMMERCIAL

## 2025-06-02 VITALS
SYSTOLIC BLOOD PRESSURE: 135 MMHG | HEIGHT: 70 IN | BODY MASS INDEX: 38.19 KG/M2 | DIASTOLIC BLOOD PRESSURE: 80 MMHG | WEIGHT: 266.75 LBS

## 2025-06-02 DIAGNOSIS — R73.01 IFG (IMPAIRED FASTING GLUCOSE): ICD-10-CM

## 2025-06-02 DIAGNOSIS — F33.41 RECURRENT MAJOR DEPRESSIVE DISORDER, IN PARTIAL REMISSION: ICD-10-CM

## 2025-06-02 DIAGNOSIS — N28.1 RENAL CYST, LEFT: ICD-10-CM

## 2025-06-02 DIAGNOSIS — Z13.6 ENCOUNTER FOR SCREENING FOR CARDIOVASCULAR DISORDERS: ICD-10-CM

## 2025-06-02 DIAGNOSIS — E78.2 MIXED HYPERLIPIDEMIA: ICD-10-CM

## 2025-06-02 DIAGNOSIS — K76.0 FATTY LIVER: ICD-10-CM

## 2025-06-02 DIAGNOSIS — Z00.00 ANNUAL PHYSICAL EXAM: Primary | ICD-10-CM

## 2025-06-02 DIAGNOSIS — Z12.31 SCREENING MAMMOGRAM, ENCOUNTER FOR: ICD-10-CM

## 2025-06-02 DIAGNOSIS — E66.01 SEVERE OBESITY (BMI 35.0-35.9 WITH COMORBIDITY): ICD-10-CM

## 2025-06-02 DIAGNOSIS — G47.33 OBSTRUCTIVE SLEEP APNEA SYNDROME: ICD-10-CM

## 2025-06-02 DIAGNOSIS — E55.9 VITAMIN D DEFICIENCY DISEASE: ICD-10-CM

## 2025-06-02 PROBLEM — Z74.09 IMPAIRED FUNCTIONAL MOBILITY AND ACTIVITY TOLERANCE: Status: RESOLVED | Noted: 2022-07-13 | Resolved: 2025-06-02

## 2025-06-02 PROCEDURE — 99999 PR PBB SHADOW E&M-EST. PATIENT-LVL IV: CPT | Mod: PBBFAC,,, | Performed by: INTERNAL MEDICINE

## 2025-06-02 PROCEDURE — 77067 SCR MAMMO BI INCL CAD: CPT | Mod: 26,,, | Performed by: RADIOLOGY

## 2025-06-02 PROCEDURE — 77063 BREAST TOMOSYNTHESIS BI: CPT | Mod: 26,,, | Performed by: RADIOLOGY

## 2025-06-02 PROCEDURE — 77067 SCR MAMMO BI INCL CAD: CPT | Mod: TC

## 2025-06-02 RX ORDER — VIT C/E/ZN/COPPR/LUTEIN/ZEAXAN 250MG-90MG
2000 CAPSULE ORAL DAILY
Qty: 60 CAPSULE | Refills: 12 | Status: SHIPPED | OUTPATIENT
Start: 2025-06-02

## 2025-06-04 ENCOUNTER — RESULTS FOLLOW-UP (OUTPATIENT)
Dept: FAMILY MEDICINE | Facility: CLINIC | Age: 55
End: 2025-06-04

## 2025-06-05 ENCOUNTER — RESULTS FOLLOW-UP (OUTPATIENT)
Dept: FAMILY MEDICINE | Facility: CLINIC | Age: 55
End: 2025-06-05

## 2025-06-05 PROBLEM — K76.89 HEPATIC CYST: Status: ACTIVE | Noted: 2025-06-05

## 2025-06-19 ENCOUNTER — TELEPHONE (OUTPATIENT)
Dept: HEPATOLOGY | Facility: CLINIC | Age: 55
End: 2025-06-19
Payer: COMMERCIAL

## 2025-06-19 NOTE — TELEPHONE ENCOUNTER
Patient scheduled for an appointment with PA Scheuermann and a fibroscan on 8/7/25.  Provider not in clinic physically on that day. Attempt made to reach patient. The above info left on her voicemail. I stressed that appointment with PA Scheuermann and fibroscan were moved to 8/11/25; appointment reminder notice mailed to her along with this note.

## 2025-07-22 ENCOUNTER — PATIENT MESSAGE (OUTPATIENT)
Dept: FAMILY MEDICINE | Facility: CLINIC | Age: 55
End: 2025-07-22
Payer: COMMERCIAL

## 2025-08-07 ENCOUNTER — PROCEDURE VISIT (OUTPATIENT)
Dept: HEPATOLOGY | Facility: CLINIC | Age: 55
End: 2025-08-07
Payer: COMMERCIAL

## 2025-08-07 DIAGNOSIS — K76.0 FATTY LIVER: ICD-10-CM

## 2025-08-08 ENCOUNTER — TELEPHONE (OUTPATIENT)
Dept: HEPATOLOGY | Facility: CLINIC | Age: 55
End: 2025-08-08
Payer: COMMERCIAL

## 2025-08-08 DIAGNOSIS — K76.0 FATTY LIVER: Primary | ICD-10-CM

## 2025-08-08 DIAGNOSIS — E66.01 SEVERE OBESITY (BMI 35.0-35.9 WITH COMORBIDITY): ICD-10-CM

## 2025-08-08 NOTE — PROCEDURES
FibroScan Transplant Hepatology(Vibration Controlled Transient Elastography)    Date/Time: 8/7/2025 9:15 AM    Performed by: Scheuermann, Jennifer B., PA  Authorized by: Scheuermann, Jennifer B., PA    Diagnosis:  NAFLD    Probe:  XL    Universal Protocol: Patient's identity, procedure and site were verified, confirmatory pause was performed.  Discussed procedure including risks and potential complications.  Questions answered.  Patient verbalizes understanding and wishes to proceed with VCTE.     Procedure: After providing explanations of the procedure, patient was placed in the supine position with right arm in maximum abduction to allow optimal exposure of right lateral abdomen.  Patient was briefly assessed, Testing was performed in the mid-axillary location, 50Hz Shear Wave pulses were applied and the resulting Shear Wave and Propagation Speed detected with a 3.5 MHz ultrasonic signal, using the FibroScan probe, Skin to liver capsule distance and liver parenchyma were accessed during the entire examination with the FibroScan probe, Patient was instructed to breathe normally and to abstain from sudden movements during the procedure, allowing for random measurements of liver stiffness. At least 10 Shear Waves were produced, Individual measurements of each Shear Wave were calculated.  Patient tolerated the procedure well with no complications.  Meets discharge criteria as was dismissed.  Rates pain 0 out of 10.  Patient will follow up with ordering provider to review results.    Findings  Median liver stiffness score:  6.2  CAP Reading: dB/m:  318    IQR/med %:  14  Interpretation  Fibrosis interpretation is based on medial liver stiffness - Kilopascal (kPa).    Fibrosis Stage:  F 0-1  Steatosis interpretation is based on controlled attenuation parameter - (dB/m).    Steatosis Grade:  S3

## 2025-08-08 NOTE — TELEPHONE ENCOUNTER
Pls see if pt can do ELF lab so results will be avail at time of next visit  (Tell her this is extra blood test to measure liver scarring)

## 2025-08-14 ENCOUNTER — PATIENT MESSAGE (OUTPATIENT)
Dept: HEPATOLOGY | Facility: CLINIC | Age: 55
End: 2025-08-14
Payer: COMMERCIAL

## 2025-08-22 ENCOUNTER — PATIENT MESSAGE (OUTPATIENT)
Dept: HEPATOLOGY | Facility: CLINIC | Age: 55
End: 2025-08-22
Payer: COMMERCIAL

## 2025-08-26 ENCOUNTER — LAB VISIT (OUTPATIENT)
Dept: LAB | Facility: HOSPITAL | Age: 55
End: 2025-08-26
Attending: PHYSICIAN ASSISTANT
Payer: COMMERCIAL

## 2025-09-04 DIAGNOSIS — K76.0 FATTY LIVER: Primary | ICD-10-CM
